# Patient Record
Sex: MALE | Race: BLACK OR AFRICAN AMERICAN | Employment: FULL TIME | URBAN - METROPOLITAN AREA
[De-identification: names, ages, dates, MRNs, and addresses within clinical notes are randomized per-mention and may not be internally consistent; named-entity substitution may affect disease eponyms.]

---

## 2019-03-14 ENCOUNTER — TELEPHONE (OUTPATIENT)
Dept: ALLERGY | Facility: CLINIC | Age: 54
End: 2019-03-14

## 2019-03-14 ENCOUNTER — TELEPHONE (OUTPATIENT)
Dept: FAMILY MEDICINE | Facility: CLINIC | Age: 54
End: 2019-03-14

## 2019-03-14 DIAGNOSIS — E11.9 TYPE 2 DIABETES MELLITUS WITHOUT COMPLICATION, WITHOUT LONG-TERM CURRENT USE OF INSULIN (H): Primary | ICD-10-CM

## 2019-03-14 RX ORDER — METFORMIN HCL 500 MG
2000 TABLET, EXTENDED RELEASE 24 HR ORAL
Qty: 120 TABLET | Refills: 1 | Status: SHIPPED | OUTPATIENT
Start: 2019-03-14 | End: 2019-05-18

## 2019-03-14 NOTE — TELEPHONE ENCOUNTER
Patient reports taking Metformin 500 mg XR 4 tablets daily.  He has been on this for years and reports no side effects.  He needs a refill for this medication as he came for his appointment with Mohit FRANCOIS and he had to leave due to emergency.  Please give 1 month worth of Prescription(s) for patient. Prescription(s) prompted.  Thank you. Laury Marion R.N.

## 2019-03-14 NOTE — TELEPHONE ENCOUNTER
Pt here for oppel visit but oppel cancelled all appts today.  Pt needs refill of medformin 500 mg.  [facility] or call to discuss    Thank you

## 2019-03-18 NOTE — PROGRESS NOTES
SUBJECTIVE:   Gregg Lua is a 53 year old male who presents to clinic today for the following health issues:      Hypertension Follow-up      Outpatient blood pressures are being checked at clinic.  Results are brought in results.    Low Salt Diet: low salt    Amount of exercise or physical activity:     Problems taking medications regularly: No    Medication side effects: headaches    Diet: regular  Recent change insurance.   headache with sneezing. Tx: over the counter antihistamin and helped, but wants to try claritin D . But concerned about HTN.     Problem list and histories reviewed & adjusted, as indicated.  Additional history: as documented    There is no problem list on file for this patient.    Past Surgical History:   Procedure Laterality Date     GALLBLADDER SURGERY         Social History     Tobacco Use     Smoking status: Never Smoker     Smokeless tobacco: Never Used   Substance Use Topics     Alcohol use: No     Frequency: Never     Family History   Problem Relation Age of Onset     Diabetes Mother      Heart Disease Father      Diabetes Brother      Diabetes Sister      Diabetes Sister      Diabetes Brother          Current Outpatient Medications   Medication Sig Dispense Refill     atorvastatin (LIPITOR) 10 MG tablet TK 1 T PO QD  1     fluticasone (FLONASE) 50 MCG/ACT nasal spray Spray 1-2 sprays into both nostrils daily 1 g 11     lisinopril (PRINIVIL/ZESTRIL) 10 MG tablet TK 1 T PO QD  1     lisinopril (PRINIVIL/ZESTRIL) 20 MG tablet Take 1 tablet (20 mg) by mouth daily 90 tablet 0     metFORMIN (GLUCOPHAGE-XR) 500 MG 24 hr tablet Take 4 tablets (2,000 mg) by mouth daily (with dinner) 120 tablet 1     UNKNOWN TO PATIENT Little blue one for diabetes       No Known Allergies    Reviewed and updated as needed this visit by clinical staff  Tobacco  Allergies  Meds  Med Hx  Surg Hx  Fam Hx  Soc Hx      Reviewed and updated as needed this visit by Provider         ROS:  Constitutional,  HEENT, cardiovascular, pulmonary, gi and gu systems are negative, except as otherwise noted.    OBJECTIVE:     BP (!) 143/92   Pulse 85   Temp 98.3  F (36.8  C) (Oral)   Wt 114.8 kg (253 lb)   SpO2 100%   There is no height or weight on file to calculate BMI.  GENERAL: healthy, alert and no distress  EYES: Eyes grossly normal to inspection, PERRL and conjunctivae and sclerae normal  HENT: ear canals and TM's normal, nose and mouth without ulcers or lesions  NECK: no adenopathy, no asymmetry, masses, or scars and thyroid normal to palpation  RESP: lungs clear to auscultation - no rales, rhonchi or wheezes  CV: regular rate and rhythm, normal S1 S2, no S3 or S4, no murmur, click or rub, no peripheral edema and peripheral pulses strong  PSYCH: mentation appears normal, affect normal/bright    Diagnostic Test Results:  none     ASSESSMENT/PLAN:         ICD-10-CM    1. Hypertension goal BP (blood pressure) < 140/90 I10 lisinopril (PRINIVIL/ZESTRIL) 20 MG tablet     Basic metabolic panel   2. Seasonal allergic rhinitis, unspecified trigger J30.2 fluticasone (FLONASE) 50 MCG/ACT nasal spray   3. Type 2 diabetes mellitus without complication, without long-term current use of insulin (H) E11.9 DIABETES EDUCATOR REFERRAL   1. Increase lisinopril 20mg daily. Recheck blood pressure in 2 wks with RN and labs.   2. Start flonase, ok for claritin over the counter  3. Due for check up in 2 months. Is due for fasting labs at that time.       Mohit Randall PA-C  [facility]

## 2019-03-19 ENCOUNTER — OFFICE VISIT (OUTPATIENT)
Dept: FAMILY MEDICINE | Facility: CLINIC | Age: 54
End: 2019-03-19
Payer: COMMERCIAL

## 2019-03-19 VITALS
OXYGEN SATURATION: 100 % | HEART RATE: 85 BPM | SYSTOLIC BLOOD PRESSURE: 143 MMHG | DIASTOLIC BLOOD PRESSURE: 92 MMHG | WEIGHT: 253 LBS | TEMPERATURE: 98.3 F

## 2019-03-19 DIAGNOSIS — E11.9 TYPE 2 DIABETES MELLITUS WITHOUT COMPLICATION, WITHOUT LONG-TERM CURRENT USE OF INSULIN (H): ICD-10-CM

## 2019-03-19 DIAGNOSIS — I10 HYPERTENSION GOAL BP (BLOOD PRESSURE) < 140/90: Primary | ICD-10-CM

## 2019-03-19 DIAGNOSIS — J30.2 SEASONAL ALLERGIC RHINITIS, UNSPECIFIED TRIGGER: ICD-10-CM

## 2019-03-19 PROCEDURE — 99203 OFFICE O/P NEW LOW 30 MIN: CPT | Performed by: PHYSICIAN ASSISTANT

## 2019-03-19 RX ORDER — ATORVASTATIN CALCIUM 10 MG/1
TABLET, FILM COATED ORAL
Refills: 1 | COMMUNITY
Start: 2019-03-02 | End: 2019-06-11

## 2019-03-19 RX ORDER — LISINOPRIL 20 MG/1
20 TABLET ORAL DAILY
Qty: 90 TABLET | Refills: 0 | Status: SHIPPED | OUTPATIENT
Start: 2019-03-19 | End: 2019-07-25

## 2019-03-19 RX ORDER — FLUTICASONE PROPIONATE 50 MCG
1-2 SPRAY, SUSPENSION (ML) NASAL DAILY
Qty: 1 G | Refills: 11 | Status: SHIPPED | OUTPATIENT
Start: 2019-03-19 | End: 2020-12-22

## 2019-03-19 RX ORDER — LISINOPRIL 10 MG/1
TABLET ORAL
Refills: 1 | COMMUNITY
Start: 2019-02-14 | End: 2019-04-11

## 2019-03-19 SDOH — HEALTH STABILITY: MENTAL HEALTH: HOW OFTEN DO YOU HAVE A DRINK CONTAINING ALCOHOL?: NEVER

## 2019-03-19 NOTE — NURSING NOTE
Chief Complaint   Patient presents with     Hypertension     Blood Pressure and headaches     Headache       Initial BP (!) 143/92   Pulse 85   Temp 98.3  F (36.8  C) (Oral)   Wt 114.8 kg (253 lb)   SpO2 100%  There is no height or weight on file to calculate BMI.  Medication Reconciliation: justus Pierce MA

## 2019-03-27 PROBLEM — I10 HYPERTENSION GOAL BP (BLOOD PRESSURE) < 140/90: Status: ACTIVE | Noted: 2019-03-27

## 2019-03-27 PROBLEM — E11.9 TYPE 2 DIABETES MELLITUS WITHOUT COMPLICATION, WITHOUT LONG-TERM CURRENT USE OF INSULIN (H): Status: ACTIVE | Noted: 2019-03-27

## 2019-03-29 ENCOUNTER — ALLIED HEALTH/NURSE VISIT (OUTPATIENT)
Dept: EDUCATION SERVICES | Facility: CLINIC | Age: 54
End: 2019-03-29
Payer: COMMERCIAL

## 2019-03-29 ENCOUNTER — TELEPHONE (OUTPATIENT)
Dept: FAMILY MEDICINE | Facility: CLINIC | Age: 54
End: 2019-03-29

## 2019-03-29 DIAGNOSIS — E11.9 TYPE 2 DIABETES MELLITUS WITHOUT COMPLICATION, WITHOUT LONG-TERM CURRENT USE OF INSULIN (H): Primary | ICD-10-CM

## 2019-03-29 DIAGNOSIS — E11.9 DIABETES MELLITUS WITHOUT COMPLICATION (H): ICD-10-CM

## 2019-03-29 PROCEDURE — G0108 DIAB MANAGE TRN  PER INDIV: HCPCS

## 2019-03-29 RX ORDER — LANCETS
EACH MISCELLANEOUS
Qty: 200 EACH | Refills: 3 | Status: SHIPPED | OUTPATIENT
Start: 2019-03-29 | End: 2022-08-25

## 2019-03-29 RX ORDER — GLUCOSAMINE HCL/CHONDROITIN SU 500-400 MG
CAPSULE ORAL
Qty: 200 EACH | Refills: 3 | Status: SHIPPED | OUTPATIENT
Start: 2019-03-29 | End: 2022-08-25

## 2019-03-29 RX ORDER — LANCETS
EACH MISCELLANEOUS
Qty: 102 EACH | Refills: 11 | Status: SHIPPED | OUTPATIENT
Start: 2019-03-29 | End: 2019-03-29

## 2019-03-29 NOTE — PATIENT INSTRUCTIONS
Diabetes Support Resources:  Follow up with Mohit madera of [facility]     Bring blood glucose meter and logbook with you to all doctor and follow-up appointments.    Diabetes Education Telephone Visit Follow-up:    We realize your time is valuable and your health is important! We offer a convenient Telephone Visit follow up! It s a quick way to check in for a medication dose adjustment without having to come back to clinic as soon.    Telephone Visits are often covered by insurance. Please check with your insurance plan to see if this type of visit is covered. If not, the cost is less expensive than an office visit:      Up to 10 minutes (Code 22681): $30    11-20 minutes (Code 40875): $59    More than 20 minutes (Code 32556): $85    Talk with your Diabetes Educator if you want to learn more.      [facility]:  For Your Diabetes Education and Nutrition Appointments Call:  [phone]   For Diabetes Education or Nutrition Related Questions:   Phone: [phone]  E-mail: [email or web address]  Fax: [phone]   If you need a medication refill please contact your pharmacy. Please allow 3 business days for your refills to be completed.

## 2019-03-29 NOTE — TELEPHONE ENCOUNTER
Page Barros sent a fax over  - Plan does not cover accu-check glide test strips. Per rx benefit plan, alternative medications include: One touch ultra Blue TE. Please fax back if approved along with directions quantity and refills, thanks

## 2019-03-29 NOTE — PROGRESS NOTES
Diabetes Self-Management Education & Support    Diabetes Education Self Management & Training    SUBJECTIVE/OBJECTIVE:  Presents for: Individual review  Accompanied by: Self  Diabetes education in the past 24mo: Yes  Focus of Visit: Monitoring, Taking Medication, Healthy Eating, Diabetes Pathophysiology  Diabetes type: Type 2  Date of diagnosis: 2017  Disease course: Stable  Transportation concerns: No  Other concerns:: None  Cultural Influences/Ethnic Background:  American      Diabetes Symptoms & Complications  Blurred vision: No  Fatigue: No  Neuropathy: No  Foot ulcerations: No  Polydipsia: No  Polyphagia: No  Polyuria: No  Visual change: No  Weakness: No  Weight loss: Yes  Slow healing wounds: No  Recent Infection(s): No  Symptom course: Stable  Weight trend: Stable  Autonomic neuropathy: No  CVA: No  Heart disease: No  Nephropathy: No  Peripheral neuropathy: No  Peripheral Vascular Disease: No  Retinopathy: No  Sexual dysfunction: No    Patient Problem List and Family Medical History reviewed for relevant medical history, current medical status, and diabetes risk factors.    Vitals:  There were no vitals taken for this visit.  There is no height or weight on file to calculate BMI.   Last 3 BP:   BP Readings from Last 3 Encounters:   03/19/19 (!) 143/92   01/10/11 147/99       History   Smoking Status     Never Smoker   Smokeless Tobacco     Never Used       Labs:  No results found for: A1C  No results found for: GLC  No results found for: LDL  No results found for: HDL]  No results found for: GFRESTIMATED  No results found for: GFRESTBLACK  No results found for: CR  No results found for: MICROALBUMIN    Healthy Eating  Healthy Eating Assessed Today: Yes  Cultural/Quaker diet restrictions?: No  Patient on a regular basis: Eats 3 meals a day  Meal planning: Smaller portions, Low salt  Meals include: Breakfast, Lunch, Dinner, Snacks  Breakfast: banana, oatmeal or egg white sandwich @ 9:30  Lunch: @ noon,  [employer] meals prepared .  or some foods from the store  Dinner: @ 7:30 sandwich, meat, or a bowl of cereal.  trying to get in more fruit  Snacks:  snack bars from [employer] , popcorn  Beverages: Water, Coffee  Has patient met with a dietitian in the past?: No    Being Active  Being Active Assessed Today: Yes  Exercise:: Yes  Days per week of moderate to strenuous exercise (like a brisk walk): 5  On average, minutes per day of exercise at this level: 60  How intense was your typical exercise? : Moderate (like brisk walking)(work out at the gym)  Exercise Minutes per Week: 300  Barrier to exercise: None    Monitoring  Monitoring Assessed Today: Yes  Did patient bring glucose meter to appointment? : No  Blood Glucose Meter: Accu-check  Home Glucose (Sugar) Monitorin-2 times per week  Blood glucose trend: No change  Low Glucose Range (mg/dL): <70  High Glucose Range (mg/dL): 130-140  Overall Range (mg/dL):     Forgot his meter    Taking Medications  Diabetes Medication(s)     Biguanides       metFORMIN (GLUCOPHAGE-XR) 500 MG 24 hr tablet    Take 4 tablets (2,000 mg) by mouth daily (with dinner)          Taking Medication Assessed Today: Yes  Current Treatments: Oral Agent (monotherapy), Diet(metformin  mg takes all with evening meal)  Problems taking diabetes medications regularly?: No  Diabetes medication side effects?: No  Treatment Compliance: All of the time    Problem Solving  Problem Solving Assessed Today: Yes  Hypoglycemia Frequency: Rarely  Hypoglycemia Treatment: Candy  Patient carries a carbohydrate source: Yes  Medical alert: No  Severe weather/disaster plan for diabetes management?: No  DKA prevention plan?: No  Sick day plan for diabetes management?: (P) No    Hypoglycemia symptoms  Confusion: No  Dizziness or Light-Headedness: No  Headaches: Yes  Hunger: No  Mood changes: No  Nervousness/Anxiety: Yes  Sleepiness: No  Speech difficulty: No  Sweats: Yes  Tremors: Yes    Hypoglycemia  Complications  Blackouts: No  Hospitalization: No  Nocturnal hypoglycemia: No  Required assistance: No  Required glucagon injection: No  Seizures: No    Reducing Risks  Reducing Risks Assessed Today: Yes  Diabetes Risks: Age over 45 years, Family History  CAD Risks: Hypertension, Diabetes Mellitus, Male sex, Family history  Has dilated eye exam at least once a year?: No  Sees dentist every 6 months?: No  Sees podiatrist (foot doctor)?: No    Healthy Coping  Healthy Coping Assessed Today: Yes  Emotional response to diabetes: Ready to learn, Acceptance  Informal Support system:: Significant other  Stage of change: ACTION (Actively working towards change)  Difficulty affording diabetes management supplies?: No  Patient Activation Measure Survey Score:  No flowsheet data found.    ASSESSMENT:  Gregg comes today for update and to establish care to manage his diabetes.  Last A1C in 11/2018 in [employer] system 6.5%.  Very motivated young man, and wanting to keep his diabetes in control.  He really wanted to focus on diet.    Goals Addressed as of 3/29/2019 at 1:43 PM       Monitoring (pt-stated)     Added 3/29/19 by Rosalind Garcia RN     My Goal: I will monitor BG    What I need to meet my goal: 1. Test every morning and 2 hrs after a meal    I plan to meet my goal by this date: 2 months            Patient's most recent 11/2018 6.5% from [facility]: A1C is meeting goal of <7.0    INTERVENTION:   Diabetes knowledge and skills assessment:     Patient is knowledgeable in diabetes management concepts related to: Being Active, Monitoring and Taking Medication    Patient needs further education on the following diabetes management concepts: Healthy Eating    Based on learning assessment above, most appropriate setting for further diabetes education would be: Individual setting.    Education provided today on:  AADE Self-Care Behaviors:  Diabetes Pathophysiology  Healthy Eating: carbohydrate counting, consistency in amount,  composition, and timing of food intake, weight reduction, heart healthy diet, eating out, portion control, plate planning method and label reading  Monitoring: purpose, proper technique, log and interpret results, individual blood glucose targets and frequency of monitoring  Taking Medication: action of prescribed medication, side effects of prescribed medications and when to take medications    Opportunities for ongoing education and support in diabetes-self management were discussed.    Pt verbalized understanding of concepts discussed and recommendations provided today.       Education Materials Provided:  Sarah Understanding Diabetes Booklet, Carbohydrate Counting and My Plate Planner    PLAN:  See Patient Instructions for co-developed, patient-stated behavior change goals.  AVS printed and provided to patient today. See Follow-Up section for recommended follow-up.    Melanie Garcia RN/LEANNE Smith Diabetes Educator    Time Spent: 60 minutes  Encounter Type: Individual    Any diabetes medication dose changes were made via the CDE Protocol and Collaborative Practice Agreement with the patient's primary care provider. A copy of this encounter was shared with the provider.

## 2019-04-08 ENCOUNTER — ALLIED HEALTH/NURSE VISIT (OUTPATIENT)
Dept: NURSING | Facility: CLINIC | Age: 54
End: 2019-04-08
Payer: COMMERCIAL

## 2019-04-08 ENCOUNTER — TELEPHONE (OUTPATIENT)
Dept: FAMILY MEDICINE | Facility: CLINIC | Age: 54
End: 2019-04-08

## 2019-04-08 VITALS — SYSTOLIC BLOOD PRESSURE: 138 MMHG | DIASTOLIC BLOOD PRESSURE: 86 MMHG | HEART RATE: 72 BPM | OXYGEN SATURATION: 99 %

## 2019-04-08 DIAGNOSIS — I10 HYPERTENSION GOAL BP (BLOOD PRESSURE) < 140/90: Primary | ICD-10-CM

## 2019-04-08 PROCEDURE — 99207 ZZC NO CHARGE NURSE ONLY: CPT | Performed by: FAMILY MEDICINE

## 2019-04-08 NOTE — PROGRESS NOTES
Gregg Lua is a 53 year old patient who comes in today for a Blood Pressure check.  Initial BP:  /90   Pulse 72   SpO2 99%      72  Disposition: BP elevated.  Triage RN notified, patient asked to wait.    Patient has been feeling woozy since medication change.        BP Readings from Last 3 Encounters:   04/08/19 140/90   03/19/19 (!) 143/92   01/10/11 147/99           Home machine is 150/59 pulse 77.          Na Lamas MA

## 2019-04-08 NOTE — TELEPHONE ENCOUNTER
Patient at clinic for bp check.  He has questions regarding symptoms of high blood pressure and when he should be checking his bp.  He states that he was switched from lisinopril 10mg po every day to lisinopril 20mg q day on 3/19/19.  He takes his lisinopril in the pm when he takes his other meds.  He states that he bought a wrist cuff and takes bp at home but runs 170's/ 90's.  I checked his bp manually with the large adult cuff right arm.  It was 138/86.  He then checked it with his wrist cuff immediately after and it was 197/97.  I let him know that that was way too much of a difference and that he shouldn't rely on his wrist cuff.  I did let him know that our pharmacy does have a blood pressure program that he can sign up for; is free but does require some paperwork.  He states that he comes back in on Thursday to see Mohit Randall PA-C so will discuss with him at that time.  He does not have the receipt or box for the wrist cuff; doesn't want to purchase another one at this time. I did review some of the possible symptoms with high blood pressure:  Bad headache, vision concerns, facial tingling, irregular heart beat, chest pain.  He states that he's noticed that he's gotten a dull headache front of head that will persist for quite a while; states noticed when out golfing recently but he also feels that he's got seasonal allergies and wondering if that may be cause of headache.  States that he and wife had a bad argument this morning before he came here and feels that is what raised his blood pressure at that time.  Checked bp after argument at home and was in the 170's.  I did also remind him that his cuff does not appear to be very accurate.  He states Mohit Randall PA-C had told him can use OTC allergy meds but not with the decongestants/pseudophedrine.  Wondering name/brand he can try.  I advised he check with pharmacist; let them know he has hypertension.  Did tell him to check out the plain zyrtec. I also  advised him to make sure that he is drinking enough water; dehydration can cause headaches as well.  Patient denies any symptoms at this time.  States does have to urinate more often with the increase in the lisinopril.  He will keep his appointment with Mohit Randall PA-C for Thursday.  Shima Cummings RN

## 2019-04-10 NOTE — PROGRESS NOTES
SUBJECTIVE:                                                    Gregg Lua is a 53 year old male seen today who  has a past medical history of Diabetes mellitus, type 2 (H), High cholesterol, and Hypertension.   who presents to clinic today for the following health issues:       Hypertension Follow-up    Answers for HPI/ROS submitted by the patient on 4/11/2019   Chronic problems general questions HPI Form  Outpatient blood pressures: are being checked  Dietary sodium intake:: Low salt diet  Blood pressures checked at: Home    History of DM and is fasting today.     Problem list and histories reviewed & adjusted, as indicated.  Additional history: as documented    Patient Active Problem List   Diagnosis     Type 2 diabetes mellitus without complication, without long-term current use of insulin (H)     Hypertension goal BP (blood pressure) < 140/90     Past Surgical History:   Procedure Laterality Date     GALLBLADDER SURGERY         Social History     Tobacco Use     Smoking status: Never Smoker     Smokeless tobacco: Never Used   Substance Use Topics     Alcohol use: No     Frequency: Never     Family History   Problem Relation Age of Onset     Diabetes Mother      Heart Disease Father      Diabetes Brother      Diabetes Sister      Diabetes Sister      Diabetes Brother          Current Outpatient Medications   Medication Sig Dispense Refill     alcohol swab prep pads Use to swab area of injection/lyle as directed. Pt tests two times daily. 200 each 3     atorvastatin (LIPITOR) 10 MG tablet TK 1 T PO QD  1     blood glucose (NO BRAND SPECIFIED) test strip Use to test blood sugar 2 times daily or as directed. To accompany: Blood Glucose Monitor Brands: per insurance. 200 strip 3     blood glucose (ONETOUCH VERIO IQ) test strip Use to test blood sugars 2 times daily or as directed. 100 each prn     blood glucose calibration (NO BRAND SPECIFIED) solution To accompany: Blood Glucose Monitor Brands: per insurance. 1  Bottle 3     blood glucose monitoring (NO BRAND SPECIFIED) meter device kit Use to test blood sugar 2 times daily or as directed. Brand  per insurance. 1 kit 0     blood glucose monitoring (ONE TOUCH DELICA) lancets Use to test blood sugar 2 times daily or as directed.  Ok to substitute alternative if insurance prefers. 100 each 11     fluticasone (FLONASE) 50 MCG/ACT nasal spray Spray 1-2 sprays into both nostrils daily 1 g 11     lisinopril (PRINIVIL/ZESTRIL) 20 MG tablet Take 1 tablet (20 mg) by mouth daily 90 tablet 0     metFORMIN (GLUCOPHAGE-XR) 500 MG 24 hr tablet Take 4 tablets (2,000 mg) by mouth daily (with dinner) 120 tablet 1     order for DME Equipment being ordered: BP monitor 1 Device 1     thin (NO BRAND SPECIFIED) lancets Use with lanceting device. To accompany: Blood Glucose Monitor Brands: per insurance. Pt tests two times daily. 200 each 3     UNKNOWN TO PATIENT [place] blue one for diabetes       No Known Allergies  Recent Labs   Lab Test 04/11/19  1050   A1C 6.0*      BP Readings from Last 3 Encounters:   04/11/19 131/71   04/08/19 138/86   03/19/19 (!) 143/92    Wt Readings from Last 3 Encounters:   04/11/19 113.4 kg (250 lb)   03/19/19 114.8 kg (253 lb)                  Labs reviewed in EPIC    ROS:  Constitutional, HEENT, cardiovascular, pulmonary, gi and gu systems are negative, except as otherwise noted.    OBJECTIVE:     /71   Pulse 79   Resp 18   Wt 113.4 kg (250 lb)   SpO2 100%   There is no height or weight on file to calculate BMI.  GENERAL: healthy, alert and no distress  RESP: lungs clear to auscultation - no rales, rhonchi or wheezes  CV: regular rate and rhythm, normal S1 S2, no S3 or S4, no murmur, click or rub, no peripheral edema and peripheral pulses strong  ABDOMEN: soft, nontender, no hepatosplenomegaly, no masses and bowel sounds normal  MS: no gross musculoskeletal defects noted, no edema    Diagnostic Test Results:  Results for orders placed or performed in visit  on 04/11/19 (from the past 24 hour(s))   HEMOGLOBIN A1C   Result Value Ref Range    Hemoglobin A1C 6.0 (H) 0 - 5.6 %       ASSESSMENT/PLAN:       ICD-10-CM    1. Hypertension goal BP (blood pressure) < 140/90 I10 Lipid panel reflex to direct LDL Fasting     order for DME     Basic metabolic panel   2. Type 2 diabetes mellitus without complication, without long-term current use of insulin (H) E11.9 HEMOGLOBIN A1C     Lipid panel reflex to direct LDL Fasting     Albumin Random Urine Quantitative with Creat Ratio     TSH WITH FREE T4 REFLEX     Basic metabolic panel   3. Need for hepatitis C screening test Z11.59 Hepatitis C Screen Reflex to HCV RNA Quant and Genotype   1. Stable. Recheck 6 months.   2. Labs pending.   Work on Healthy diet and exercise. Getting heart rate elevated for 30 mins most days of week.      Mohit Randall PA-C  [facility]    Answers for HPI/ROS submitted by the patient on 4/11/2019   Chronic problems general questions HPI Form  Outpatient blood pressures: are being checked  Dietary sodium intake:: Low salt diet  Blood pressures checked at: Home

## 2019-04-11 ENCOUNTER — OFFICE VISIT (OUTPATIENT)
Dept: FAMILY MEDICINE | Facility: CLINIC | Age: 54
End: 2019-04-11
Payer: COMMERCIAL

## 2019-04-11 VITALS
OXYGEN SATURATION: 100 % | HEART RATE: 79 BPM | WEIGHT: 250 LBS | SYSTOLIC BLOOD PRESSURE: 131 MMHG | DIASTOLIC BLOOD PRESSURE: 71 MMHG | RESPIRATION RATE: 18 BRPM

## 2019-04-11 DIAGNOSIS — E11.9 TYPE 2 DIABETES MELLITUS WITHOUT COMPLICATION, WITHOUT LONG-TERM CURRENT USE OF INSULIN (H): ICD-10-CM

## 2019-04-11 DIAGNOSIS — Z11.59 NEED FOR HEPATITIS C SCREENING TEST: ICD-10-CM

## 2019-04-11 DIAGNOSIS — I10 HYPERTENSION GOAL BP (BLOOD PRESSURE) < 140/90: Primary | ICD-10-CM

## 2019-04-11 LAB
ANION GAP SERPL CALCULATED.3IONS-SCNC: 7 MMOL/L (ref 3–14)
BUN SERPL-MCNC: 13 MG/DL (ref 7–30)
CALCIUM SERPL-MCNC: 9 MG/DL (ref 8.5–10.1)
CHLORIDE SERPL-SCNC: 109 MMOL/L (ref 94–109)
CHOLEST SERPL-MCNC: 103 MG/DL
CO2 SERPL-SCNC: 25 MMOL/L (ref 20–32)
CREAT SERPL-MCNC: 1.02 MG/DL (ref 0.66–1.25)
GFR SERPL CREATININE-BSD FRML MDRD: 82 ML/MIN/{1.73_M2}
GLUCOSE SERPL-MCNC: 100 MG/DL (ref 70–99)
HBA1C MFR BLD: 6 % (ref 0–5.6)
HDLC SERPL-MCNC: 40 MG/DL
LDLC SERPL CALC-MCNC: 35 MG/DL
NONHDLC SERPL-MCNC: 63 MG/DL
POTASSIUM SERPL-SCNC: 4.3 MMOL/L (ref 3.4–5.3)
SODIUM SERPL-SCNC: 141 MMOL/L (ref 133–144)
TRIGL SERPL-MCNC: 138 MG/DL
TSH SERPL DL<=0.005 MIU/L-ACNC: 1.78 MU/L (ref 0.4–4)

## 2019-04-11 PROCEDURE — 84443 ASSAY THYROID STIM HORMONE: CPT | Performed by: PHYSICIAN ASSISTANT

## 2019-04-11 PROCEDURE — 83036 HEMOGLOBIN GLYCOSYLATED A1C: CPT | Performed by: PHYSICIAN ASSISTANT

## 2019-04-11 PROCEDURE — 80061 LIPID PANEL: CPT | Performed by: PHYSICIAN ASSISTANT

## 2019-04-11 PROCEDURE — 80048 BASIC METABOLIC PNL TOTAL CA: CPT | Performed by: PHYSICIAN ASSISTANT

## 2019-04-11 PROCEDURE — 86803 HEPATITIS C AB TEST: CPT | Performed by: PHYSICIAN ASSISTANT

## 2019-04-11 PROCEDURE — 36415 COLL VENOUS BLD VENIPUNCTURE: CPT | Performed by: PHYSICIAN ASSISTANT

## 2019-04-11 PROCEDURE — 82043 UR ALBUMIN QUANTITATIVE: CPT | Performed by: PHYSICIAN ASSISTANT

## 2019-04-11 PROCEDURE — 99213 OFFICE O/P EST LOW 20 MIN: CPT | Performed by: PHYSICIAN ASSISTANT

## 2019-04-11 NOTE — LETTER
April 12, 2019    Gregg Lua  [id number] [facility] [id number]        Mr. Lua,    All of your labs were normal/ stable for you.    Please contact the clinic if you have additional questions.  Thank you.    Sincerely,    Mohit Randall PA-C     Results for orders placed or performed in visit on 04/11/19   HEMOGLOBIN A1C   Result Value Ref Range    Hemoglobin A1C 6.0 (H) 0 - 5.6 %   Lipid panel reflex to direct LDL Fasting   Result Value Ref Range    Cholesterol 103 <200 mg/dL    Triglycerides 138 <150 mg/dL    HDL Cholesterol 40 >39 mg/dL    LDL Cholesterol Calculated 35 <100 mg/dL    Non HDL Cholesterol 63 <130 mg/dL   TSH WITH FREE T4 REFLEX   Result Value Ref Range    TSH 1.78 0.40 - 4.00 mU/L   Basic metabolic panel   Result Value Ref Range    Sodium 141 133 - 144 mmol/L    Potassium 4.3 3.4 - 5.3 mmol/L    Chloride 109 94 - 109 mmol/L    Carbon Dioxide 25 20 - 32 mmol/L    Anion Gap 7 3 - 14 mmol/L    Glucose 100 (H) 70 - 99 mg/dL    Urea Nitrogen 13 7 - 30 mg/dL    Creatinine 1.02 0.66 - 1.25 mg/dL    GFR Estimate 82 >60 mL/min/[1.73_m2]    GFR Estimate If Black >90 >60 mL/min/[1.73_m2]    Calcium 9.0 8.5 - 10.1 mg/dL

## 2019-04-12 LAB
CREAT UR-MCNC: 197 MG/DL
HCV AB SERPL QL IA: NONREACTIVE
MICROALBUMIN UR-MCNC: 11 MG/L
MICROALBUMIN/CREAT UR: 5.69 MG/G CR (ref 0–17)

## 2019-04-12 NOTE — RESULT ENCOUNTER NOTE
Mr. Lua,    All of your labs were normal/ stable for you.    Please contact the clinic if you have additional questions.  Thank you.    Sincerely,    Mohit Randall PA-C

## 2019-05-03 ENCOUNTER — TELEPHONE (OUTPATIENT)
Dept: EDUCATION SERVICES | Facility: CLINIC | Age: 54
End: 2019-05-03

## 2019-05-03 DIAGNOSIS — Z53.9 NO SHOW: Primary | ICD-10-CM

## 2019-05-18 DIAGNOSIS — E11.9 TYPE 2 DIABETES MELLITUS WITHOUT COMPLICATION, WITHOUT LONG-TERM CURRENT USE OF INSULIN (H): ICD-10-CM

## 2019-05-21 RX ORDER — METFORMIN HCL 500 MG
TABLET, EXTENDED RELEASE 24 HR ORAL
Qty: 120 TABLET | Refills: 4 | Status: SHIPPED | OUTPATIENT
Start: 2019-05-21 | End: 2019-10-10

## 2019-06-11 DIAGNOSIS — E11.9 TYPE 2 DIABETES MELLITUS WITHOUT COMPLICATION, WITHOUT LONG-TERM CURRENT USE OF INSULIN (H): Primary | ICD-10-CM

## 2019-06-11 RX ORDER — ATORVASTATIN CALCIUM 10 MG/1
TABLET, FILM COATED ORAL
Qty: 90 TABLET | Refills: 2 | Status: SHIPPED | OUTPATIENT
Start: 2019-06-11 | End: 2020-03-02

## 2019-06-11 NOTE — TELEPHONE ENCOUNTER
Routing refill request to provider for review/approval because:  Medication is reported/historical    Winifred Beltran BSN, RN

## 2019-06-19 ENCOUNTER — OFFICE VISIT (OUTPATIENT)
Dept: FAMILY MEDICINE | Facility: CLINIC | Age: 54
End: 2019-06-19
Payer: COMMERCIAL

## 2019-06-19 VITALS
WEIGHT: 253 LBS | TEMPERATURE: 98.1 F | DIASTOLIC BLOOD PRESSURE: 83 MMHG | BODY MASS INDEX: 38.34 KG/M2 | HEART RATE: 83 BPM | HEIGHT: 68 IN | SYSTOLIC BLOOD PRESSURE: 139 MMHG | OXYGEN SATURATION: 100 %

## 2019-06-19 DIAGNOSIS — M54.2 NECK PAIN: Primary | ICD-10-CM

## 2019-06-19 DIAGNOSIS — E11.9 TYPE 2 DIABETES MELLITUS WITHOUT COMPLICATION, WITHOUT LONG-TERM CURRENT USE OF INSULIN (H): ICD-10-CM

## 2019-06-19 PROBLEM — E66.01 MORBID OBESITY (H): Status: ACTIVE | Noted: 2019-06-19

## 2019-06-19 PROCEDURE — 99214 OFFICE O/P EST MOD 30 MIN: CPT | Performed by: FAMILY MEDICINE

## 2019-06-19 RX ORDER — CYCLOBENZAPRINE HCL 5 MG
TABLET ORAL
Qty: 60 TABLET | Refills: 0 | Status: SHIPPED | OUTPATIENT
Start: 2019-06-19 | End: 2021-11-11

## 2019-06-19 RX ORDER — HYDROCODONE BITARTRATE AND ACETAMINOPHEN 5; 325 MG/1; MG/1
1 TABLET ORAL
Qty: 10 TABLET | Refills: 0 | Status: SHIPPED | OUTPATIENT
Start: 2019-06-19 | End: 2019-08-13

## 2019-06-19 ASSESSMENT — MIFFLIN-ST. JEOR: SCORE: 1957.1

## 2019-06-19 NOTE — NURSING NOTE
"Chief Complaint   Patient presents with     Neck Pain     noticed it a little on Mon but is getting more noticable now and has felt on & off into the LT shoulder-worked out hard on mond or may have slept wrong       Initial /83   Pulse 83   Temp 98.1  F (36.7  C) (Oral)   Ht 1.727 m (5' 8\")   Wt 114.8 kg (253 lb)   SpO2 100%   BMI 38.47 kg/m   Estimated body mass index is 38.47 kg/m  as calculated from the following:    Height as of this encounter: 1.727 m (5' 8\").    Weight as of this encounter: 114.8 kg (253 lb)..  BP completed using cuff size: large    "

## 2019-06-19 NOTE — PROGRESS NOTES
"Chief complaint: neck pain left     Yesterday when he woke up and tried to get out of the bed and had a sudden pain  8-9/10 yesterday  Today about an 8/10    Patient took some advil and tylenol didn't seem to help us much  Left neck feels like a knot  If he tries to turn to the left and up tight    He did work out pretty hard 3 days ago     DM doing well per patient   History of trauma: none   Worse with movement relieved by rest  Worse with certain positions.  No fevers or chills chest pain or shortness of breath   No orthopnea pnd or edema     No thoughts of harming self or others      Problem list, Medication list, Allergies, and Medical/Social/Surgical histories reviewed in [facility] and updated as appropriate.        REVIEW OF SYSTEMS  General: negative for fever, constitutional symptoms or weight loss  Resp: negative for chest pain or shortness of breath  CV: negative for chest pain  : negative for dysuria , incontinence, frequency  Musculoskeletal: as above  Neurologic: negative for ataxia, saddle anesthesia, fecal or urinary incontinence, one sided weakness,  Paresthesias  Constitutional, HEENT, cardiovascular, pulmonary, gi and gu systems are negative, except as otherwise noted.    Physical Exam:  Vitals: /83   Pulse 83   Temp 98.1  F (36.7  C) (Oral)   Ht 1.727 m (5' 8\")   Wt 114.8 kg (253 lb)   SpO2 100%   BMI 38.47 kg/m    BMI= Body mass index is 38.47 kg/m .  Constitutional: healthy, alert and no acute distress   Head: atraumatic  Eyes: anicteric  CARDIO: regular in rate and rhythm no murmurs rubs or gallops  RESP: lungs clear to auscultation  Musculoskeletal: Nocervical spine tenderness   YES trapezius and levator muscle spasm on the left    Increased pain  In range of motion of the neck  No neurologic deficits. No sensory deficits or motor deficits both upper and lower extremity   Negative Lhermitte's phenomenon  GAIT: intact  Psychiatric: mentation appears normal and affect " normal/bright  Skin: no rashes      Impression:   No diagnosis found.     ICD-10-CM    1. Neck pain M54.2 cyclobenzaprine (FLEXERIL) 5 MG tablet     HYDROcodone-acetaminophen (NORCO) 5-325 MG tablet     [facility] PT, HAND, AND CHIROPRACTIC REFERRAL   2. Type 2 diabetes mellitus without complication, without long-term current use of insulin (H) E11.9            Plan:  Possible injury trapezius with his new workout   Prescribed with flexeril  Prescribed with vicodin as needed moderate to severe pain not better with flexeril   reviewed no concerns   Warned both sedating  Warned vicodin habit forming  No thoughts of harming self or others  Sedating medications given. Aware not to drive or operate machinery while on these medications. Caution with .   Do not take with alcohol  Instructions for neck care and return precautions discussed.    neck pain stretching excercises discussed. supportive treatment.  considery physical therapy if not better despite supportive treatment.  activity modifications advised.  Over the counter medications discussed. Patient aware to avoid NSAIDS if with any kidney disease or ulcers. Proper dosing of over the counter medications likewise discussed.  Adverse reaction to medication discussed.  aware to come in right away if with any fever or chills, worsening symptoms, headache, bowel or bladder incontinence, motor or sensory deficits or gait disturbances.   follow-up recommended.    Continue DM with follow up with primary care provider  Stable.       Brandi Coronel MD

## 2019-06-25 ENCOUNTER — TELEPHONE (OUTPATIENT)
Dept: FAMILY MEDICINE | Facility: CLINIC | Age: 54
End: 2019-06-25

## 2019-06-25 NOTE — TELEPHONE ENCOUNTER
[facility] has Authorized Hydrocodone-acetaminophen.  Dated May 20, 2019 - June 18, 2020.  You can print this off on-line. Thank You.

## 2019-07-25 DIAGNOSIS — I10 HYPERTENSION GOAL BP (BLOOD PRESSURE) < 140/90: ICD-10-CM

## 2019-07-26 RX ORDER — LISINOPRIL 20 MG/1
TABLET ORAL
Qty: 90 TABLET | Refills: 1 | Status: SHIPPED | OUTPATIENT
Start: 2019-07-26 | End: 2019-10-10

## 2019-08-03 ENCOUNTER — OFFICE VISIT (OUTPATIENT)
Dept: URGENT CARE | Facility: URGENT CARE | Age: 54
End: 2019-08-03
Payer: COMMERCIAL

## 2019-08-03 VITALS
SYSTOLIC BLOOD PRESSURE: 142 MMHG | OXYGEN SATURATION: 100 % | HEART RATE: 86 BPM | DIASTOLIC BLOOD PRESSURE: 87 MMHG | TEMPERATURE: 98.1 F | WEIGHT: 253 LBS | BODY MASS INDEX: 38.47 KG/M2

## 2019-08-03 DIAGNOSIS — R47.81 SLURRED SPEECH: Primary | ICD-10-CM

## 2019-08-03 DIAGNOSIS — I10 HYPERTENSION GOAL BP (BLOOD PRESSURE) < 140/90: ICD-10-CM

## 2019-08-03 PROCEDURE — 99215 OFFICE O/P EST HI 40 MIN: CPT | Performed by: INTERNAL MEDICINE

## 2019-08-03 NOTE — PROGRESS NOTES
"SUBJECTIVE:  Gregg Lua is an 54 year old male who presents for feeling woozy and wanting blood pressure checked.  His woozy feeling is like a little bit of headache and feeling like his tongue is thick and speech is a little bit off or a little slurred like he's \"thick tongue\".   He's worried his BP is high  Is taking his medication consistently.  His lisinopril was increased from 10-20 mg a week ago when he found out he was taking a lower dose than he was supposed to.  Has DM and taking meds regularly.  Not having low blood sugars.  Works out 4-5 times a week usually but has been going less since feeling woozy.   No numbness or weakness.  Feels a little more tired than usual.  No vision changes.  Pt reports his sugars have been fine and is not running low or high over the past week.    PMH:   has a past medical history of Diabetes mellitus, type 2 (H), High cholesterol, and Hypertension.  Patient Active Problem List   Diagnosis     Type 2 diabetes mellitus without complication, without long-term current use of insulin (H)     Hypertension goal BP (blood pressure) < 140/90     Obesity (BMI 35.0-39.9) with comorbidity (H)     Social History     Socioeconomic History     Marital status:      Spouse name: None     Number of children: None     Years of education: None     Highest education level: None   Occupational History     None   Social Needs     Financial resource strain: None     Food insecurity:     Worry: None     Inability: None     Transportation needs:     Medical: None     Non-medical: None   Tobacco Use     Smoking status: Never Smoker     Smokeless tobacco: Never Used   Substance and Sexual Activity     Alcohol use: No     Frequency: Never     Drug use: No     Sexual activity: Yes     Partners: Female   Lifestyle     Physical activity:     Days per week: None     Minutes per session: None     Stress: None   Relationships     Social connections:     Talks on phone: None     Gets together: None "     Attends [employer] service: None     Active member of club or organization: None     Attends meetings of clubs or organizations: None     Relationship status: None     Intimate partner violence:     Fear of current or ex partner: None     Emotionally abused: None     Physically abused: None     Forced sexual activity: None   Other Topics Concern     None   Social History Narrative     None     Family History   Problem Relation Age of Onset     Diabetes Mother      Heart Disease Father      Diabetes Brother      Diabetes Sister      Diabetes Sister      Diabetes Brother        ALLERGIES:  Patient has no known allergies.    Current Outpatient Medications   Medication     alcohol swab prep pads     atorvastatin (LIPITOR) 10 MG tablet     blood glucose (NO BRAND SPECIFIED) test strip     blood glucose calibration (NO BRAND SPECIFIED) solution     blood glucose monitoring (NO BRAND SPECIFIED) meter device kit     blood glucose monitoring (ONE TOUCH DELICA) lancets     fluticasone (FLONASE) 50 MCG/ACT nasal spray     lisinopril (PRINIVIL/ZESTRIL) 20 MG tablet     metFORMIN (GLUCOPHAGE-XR) 500 MG 24 hr tablet     order for DME     thin (NO BRAND SPECIFIED) lancets     UNKNOWN TO PATIENT     cyclobenzaprine (FLEXERIL) 5 MG tablet     No current facility-administered medications for this visit.          ROS:  ROS is done and is negative for general/constitutional, eye, ENT, Respiratory, cardiovascular, GI, , Skin, musculoskeletal except as noted elsewhere.  All other review of systems negative except as noted elsewhere.      OBJECTIVE:  BP (!) 142/87   Pulse 86   Temp 98.1  F (36.7  C) (Oral)   Wt 114.8 kg (253 lb)   SpO2 100%   BMI 38.47 kg/m    GENERAL APPEARANCE: Alert, in no acute distress  EYES: normal  EARS: External ears normal. Canals clear. TM's normal.  NOSE:normal  OROPHARYNX:normal  NECK:No adenopathy,masses or thyromegaly  RESP: normal and clear to auscultation  CV:regular rate and rhythm and no  murmurs, clicks, or gallops  ABDOMEN: Abdomen soft, non-tender. BS normal. No masses, organomegaly  SKIN: no ulcers, lesions or rash  MUSCULOSKELETAL:Musculoskeletal normal  NEURO: CN 2-12 grossly intact.  Strength 5/5 and symmetric in bilateral upper and lower extremities.  DTRs 2+ and symmetric in bilateral upper and lower extremities.  Sensation to light touch grossly intact in bilateral upper and lower extremities.    RESULTS  Results for orders placed or performed in visit on 04/11/19   Hepatitis C Screen Reflex to HCV RNA Quant and Genotype   Result Value Ref Range    Hepatitis C Antibody Nonreactive NR^Nonreactive   HEMOGLOBIN A1C   Result Value Ref Range    Hemoglobin A1C 6.0 (H) 0 - 5.6 %   Lipid panel reflex to direct LDL Fasting   Result Value Ref Range    Cholesterol 103 <200 mg/dL    Triglycerides 138 <150 mg/dL    HDL Cholesterol 40 >39 mg/dL    LDL Cholesterol Calculated 35 <100 mg/dL    Non HDL Cholesterol 63 <130 mg/dL   Albumin Random Urine Quantitative with Creat Ratio   Result Value Ref Range    Creatinine Urine 197 mg/dL    Albumin Urine mg/L 11 mg/L    Albumin Urine mg/g Cr 5.69 0 - 17 mg/g Cr   TSH WITH FREE T4 REFLEX   Result Value Ref Range    TSH 1.78 0.40 - 4.00 mU/L   Basic metabolic panel   Result Value Ref Range    Sodium 141 133 - 144 mmol/L    Potassium 4.3 3.4 - 5.3 mmol/L    Chloride 109 94 - 109 mmol/L    Carbon Dioxide 25 20 - 32 mmol/L    Anion Gap 7 3 - 14 mmol/L    Glucose 100 (H) 70 - 99 mg/dL    Urea Nitrogen 13 7 - 30 mg/dL    Creatinine 1.02 0.66 - 1.25 mg/dL    GFR Estimate 82 >60 mL/min/[1.73_m2]    GFR Estimate If Black >90 >60 mL/min/[1.73_m2]    Calcium 9.0 8.5 - 10.1 mg/dL   .  No results found for this or any previous visit (from the past 48 hour(s)).    ASSESSMENT/PLAN:    ASSESSMENT / PLAN:  (R42.92) Slurred speech  (primary encounter diagnosis)  Comment: concern for possible TIAs.  Currently his neuro exam is normal and speech seems wnl, although I do not know  pt's normal speech.  His sxs could also be related to the increase in his lisinopril, but would not expect this to cause slurred speech.  Plan: will send him to ER via ambulance for further eval as stroke or tia possible causes of his sxs and could be life threatening.  Ambulance [facility] take [facility], which is his preferred hospital.      (I10) Hypertension goal BP (blood pressure) < 140/90  Comment: BP a little above goal  Plan: f/u with pcp in 1 week.  To ER now, as noted above.      See [facility] for orders, medications, letters, patient instructions    Lexie Iqbal M.D.

## 2019-08-03 NOTE — PATIENT INSTRUCTIONS
Go to the Emergency Room now.    Gregg to follow up with Primary Care provider regarding elevated blood pressure.

## 2019-08-05 ENCOUNTER — TELEPHONE (OUTPATIENT)
Dept: FAMILY MEDICINE | Facility: CLINIC | Age: 54
End: 2019-08-05

## 2019-08-07 ENCOUNTER — ALLIED HEALTH/NURSE VISIT (OUTPATIENT)
Dept: FAMILY MEDICINE | Facility: CLINIC | Age: 54
End: 2019-08-07
Payer: COMMERCIAL

## 2019-08-07 VITALS — SYSTOLIC BLOOD PRESSURE: 132 MMHG | DIASTOLIC BLOOD PRESSURE: 84 MMHG | HEART RATE: 84 BPM

## 2019-08-07 DIAGNOSIS — Z01.30 BLOOD PRESSURE CHECK: Primary | ICD-10-CM

## 2019-08-07 PROCEDURE — 99207 ZZC NO CHARGE NURSE ONLY: CPT | Performed by: PHYSICIAN ASSISTANT

## 2019-08-07 NOTE — PROGRESS NOTES
Gregg Lua was evaluated at [facility] on August 7, 2019 at which time his blood pressure was:    BP Readings from Last 3 Encounters:   08/07/19 132/84   08/03/19 (!) 142/87   06/19/19 139/83     Pulse Readings from Last 3 Encounters:   08/07/19 84   08/03/19 86   06/19/19 83       Reviewed lifestyle modifications for blood pressure control and reduction: including making healthy food choices, managing weight, getting regular exercise, smoking cessation, reducing alcohol consumption, monitoring blood pressure regularly.     Symptoms: None    BP Goal:< 140/90 mmHg    BP Assessment:  BP at goal    Potential Reasons for BP too high: NA - Not applicable    BP Follow-Up Plan: Recheck BP in 6 months at pharmacy    Recommendation to Provider: None    Note completed by: Taqueria Addison RPh.  [facility]  [phone]

## 2019-08-13 ENCOUNTER — OFFICE VISIT (OUTPATIENT)
Dept: FAMILY MEDICINE | Facility: CLINIC | Age: 54
End: 2019-08-13
Payer: COMMERCIAL

## 2019-08-13 VITALS
OXYGEN SATURATION: 100 % | WEIGHT: 247 LBS | BODY MASS INDEX: 37.44 KG/M2 | HEART RATE: 93 BPM | HEIGHT: 68 IN | RESPIRATION RATE: 18 BRPM | DIASTOLIC BLOOD PRESSURE: 77 MMHG | SYSTOLIC BLOOD PRESSURE: 116 MMHG

## 2019-08-13 DIAGNOSIS — I10 HYPERTENSION GOAL BP (BLOOD PRESSURE) < 140/90: Primary | ICD-10-CM

## 2019-08-13 DIAGNOSIS — E11.9 TYPE 2 DIABETES MELLITUS WITHOUT COMPLICATION, WITHOUT LONG-TERM CURRENT USE OF INSULIN (H): ICD-10-CM

## 2019-08-13 DIAGNOSIS — E66.01 MORBID OBESITY (H): ICD-10-CM

## 2019-08-13 PROCEDURE — 99213 OFFICE O/P EST LOW 20 MIN: CPT | Performed by: PHYSICIAN ASSISTANT

## 2019-08-13 RX ORDER — AMLODIPINE BESYLATE 10 MG/1
10 TABLET ORAL DAILY
COMMUNITY
Start: 2019-08-03 | End: 2019-08-29

## 2019-08-13 ASSESSMENT — MIFFLIN-ST. JEOR: SCORE: 1929.88

## 2019-08-13 NOTE — PROGRESS NOTES
Subjective     Gregg Lua is a 54 year old male who presents to clinic today for the following health issues:    History of Present Illness        Hypertension: He presents for follow up of hypertension.  He does not check blood pressure  regularly outside of the clinic. Outside blood pressures have been over 140/90. He follows a low salt diet.      ED/UC Followup:    Facility:  [facility]  Date of visit: 8/3/19  Reason for visit: Neurological symptoms, hypertension   Current Status: feeling better, back to normal.    No chest pain or short of breath. No headache or dizziness.    Feeling better since starting amlodipine.     Care Everywhere Reviewed    Gregg Lua is a 54 y.o. male with history of hypertension who presents to the emergency department via EMS for evaluation of lightheadedness and speech changes. The patient reports that his lisinopril was increased from 10-20 mg a week ago when he found he was taking a lower dose than he was supposed to. Since then, he has been experiencing lightheadedness and feeling like his tongue and lips are thick and therefore causing enunciation problems. He has had associated facial numbness and notes of ongoing left sided neck pain for the past month, despite receiving. He was seen at urgent care earlier today for this and subsequently placed on flexeril and norco. Given his lightheadedness and enunciation problems, he was referred to the emergency department for further evaluation. He denies recent falls, injuries, or trauma. No further complaints or concerns are voiced at this time.     Imaging:  MR Brain WO Contrast- TIA or Stroke:  No intracranial mass, mass effect or acute infarction. Mild chronic ischemic changes intracranially. Subtotal opacification right-sided mastoid air cells. See above for details and description.  Report per radiology.   Impression and Plan:  Gregg Lua is a 54 y.o. male who presented with unusual symptoms including some perhaps mild  speech deficits but no aphasia, slurring of the speech, or any neuro deficit here. He also had some left sided facial symptoms. No evidence of weakness or facial asymmetry or Guillain-barre. He is otherwise well appearing. He is hypertensive but his blood pressure actually went up when he was in the ED so this could be a bit of white count hypertension as well. We gave him a small dose of amlodipine. Sent him for an MRI that was fortunately negative. He had some trapezius pain on the left and this appears to truly be muscular pain. This has been ongoing for months and there is no evidence for stroke on his MRI so I do not think that he has a dissection. He is safe to be discharged home. I did suggest that maybe some of the unusual sensation in his tongue could be mild subclinical angioedema. I prescribed amlodipine 10 mg for him to take at home. He can transition from the ACE inhibitor to this to see if he gets better. There is nothing that merits admission to the hospital at this time. He does not appear to have any oral lingual edema that is evident upon exam. This all did seem to start when he went up to 20 mg on lisinopril per day. He does not appear to need emergent airway or intervention at this time. He will be subsequently discharged home. Return to the emergency department immediately with any sign of tongue or lip edema, chest pain, shortness of breath, vision loss, or any other new concerns.          Patient Active Problem List   Diagnosis     Type 2 diabetes mellitus without complication, without long-term current use of insulin (H)     Hypertension goal BP (blood pressure) < 140/90     Obesity (BMI 35.0-39.9) with comorbidity (H)     Past Surgical History:   Procedure Laterality Date     GALLBLADDER SURGERY         Social History     Tobacco Use     Smoking status: Never Smoker     Smokeless tobacco: Never Used   Substance Use Topics     Alcohol use: No     Frequency: Never     Family History   Problem  Relation Age of Onset     Diabetes Mother      Heart Disease Father      Diabetes Brother      Diabetes Sister      Diabetes Sister      Diabetes Brother          Current Outpatient Medications   Medication Sig Dispense Refill     alcohol swab prep pads Use to swab area of injection/lyle as directed. Pt tests two times daily. 200 each 3     amLODIPine (NORVASC) 10 MG tablet Take 10 mg by mouth daily       atorvastatin (LIPITOR) 10 MG tablet TAKE 1 TABLET BY MOUTH EVERY DAY 90 tablet 2     blood glucose (NO BRAND SPECIFIED) test strip Use to test blood sugar 2 times daily or as directed. To accompany: Blood Glucose Monitor Brands: per insurance. 200 strip 3     blood glucose calibration (NO BRAND SPECIFIED) solution To accompany: Blood Glucose Monitor Brands: per insurance. 1 Bottle 3     blood glucose monitoring (NO BRAND SPECIFIED) meter device kit Use to test blood sugar 2 times daily or as directed. Brand  per insurance. 1 kit 0     blood glucose monitoring (ONE TOUCH DELICA) lancets Use to test blood sugar 2 times daily or as directed.  Ok to substitute alternative if insurance prefers. 100 each 11     cyclobenzaprine (FLEXERIL) 5 MG tablet May take 1 or 2 tablets 3 times a day as needed for muscle spasm and pain. Sedating. Preferably take at bedtime 60 tablet 0     fluticasone (FLONASE) 50 MCG/ACT nasal spray Spray 1-2 sprays into both nostrils daily 1 g 11     lisinopril (PRINIVIL/ZESTRIL) 20 MG tablet TAKE 1 TABLET(20 MG) BY MOUTH DAILY 90 tablet 1     metFORMIN (GLUCOPHAGE-XR) 500 MG 24 hr tablet TAKE 4 TABLETS(2000 MG) BY MOUTH DAILY WITH DINNER 120 tablet 4     order for DME Equipment being ordered: BP monitor 1 Device 1     thin (NO BRAND SPECIFIED) lancets Use with lanceting device. To accompany: Blood Glucose Monitor Brands: per insurance. Pt tests two times daily. 200 each 3     UNKNOWN TO PATIENT Little blue one for diabetes       No Known Allergies  Recent Labs   Lab Test 04/11/19  1050   A1C 6.0*  "  LDL 35   HDL 40   TRIG 138   CR 1.02   GFRESTIMATED 82   GFRESTBLACK >90   POTASSIUM 4.3   TSH 1.78      BP Readings from Last 3 Encounters:   08/13/19 116/77   08/07/19 132/84   08/03/19 (!) 142/87    Wt Readings from Last 3 Encounters:   08/13/19 112 kg (247 lb)   08/03/19 114.8 kg (253 lb)   06/19/19 114.8 kg (253 lb)         Reviewed and updated as needed this visit by Provider  Tobacco  Allergies  Meds  Problems  Med Hx  Surg Hx  Fam Hx         Review of Systems   ROS COMP: Constitutional, HEENT, cardiovascular, pulmonary, gi and gu systems are negative, except as otherwise noted.      Objective    /77   Pulse 93   Resp 18   Ht 1.727 m (5' 8\")   Wt 112 kg (247 lb)   SpO2 100%   BMI 37.56 kg/m    Body mass index is 37.56 kg/m .  Physical Exam   GENERAL: healthy, alert and no distress  EYES: Eyes grossly normal to inspection, PERRL and conjunctivae and sclerae normal  HENT: ear canals and TM's normal, nose and mouth without ulcers or lesions  NECK: no adenopathy, no asymmetry, masses, or scars and thyroid normal to palpation  RESP: lungs clear to auscultation - no rales, rhonchi or wheezes  CV: regular rate and rhythm, normal S1 S2, no S3 or S4, no murmur, click or rub, no peripheral edema and peripheral pulses strong  MS: no gross musculoskeletal defects noted, no edema  NEURO: Normal strength and tone, mentation intact and speech normal  PSYCH: mentation appears normal, affect normal/bright    Diagnostic Test Results:  Labs reviewed in Epic        Assessment & Plan       ICD-10-CM    1. Hypertension goal BP (blood pressure) < 140/90 I10 BARIATRIC ADULT REFERRAL   2. Type 2 diabetes mellitus without complication, without long-term current use of insulin (H) E11.9 BARIATRIC ADULT REFERRAL   3. Obesity (BMI 35.0-39.9) with comorbidity (H) E66.01 BARIATRIC ADULT REFERRAL   1-2. Work on Healthy diet and exercise. Getting heart rate elevated for 30 mins most days of week. warning signs discussed. " "  Recheck blood pressure in 6 months.   3. Referral placed.      BMI:   Estimated body mass index is 37.56 kg/m  as calculated from the following:    Height as of this encounter: 1.727 m (5' 8\").    Weight as of this encounter: 112 kg (247 lb).   Weight management plan: Patient was referred to their PCP to discuss a diet and exercise plan.      Return in about 6 months (around 2/13/2020) for recheck.    Mohit Randall PA-C  [facility]      "

## 2019-08-29 DIAGNOSIS — I10 HYPERTENSION GOAL BP (BLOOD PRESSURE) < 140/90: Primary | ICD-10-CM

## 2019-08-29 RX ORDER — AMLODIPINE BESYLATE 10 MG/1
TABLET ORAL
Qty: 90 TABLET | Refills: 1 | Status: SHIPPED | OUTPATIENT
Start: 2019-08-29 | End: 2019-10-10

## 2019-08-29 NOTE — TELEPHONE ENCOUNTER
Routing refill request to provider for review/approval because:  Medication is reported/historical  Erendira Jiang BSN, RN

## 2019-09-23 ENCOUNTER — TELEPHONE (OUTPATIENT)
Dept: FAMILY MEDICINE | Facility: CLINIC | Age: 54
End: 2019-09-23

## 2019-09-23 NOTE — LETTER
September 23, 2019      Gregg Lua  [address] [facility] [id number]        Dear [facility] Patient,    Your provider reviewed your medical record and found that you are due for colorectal cancer screening. Having regular screenings helps detect cancer early.  In the past, you have completed a FIT test (Fecal Immunochemical Test) for your colorectal cancer screening. This test looks for blood in your stool and is done once a year.   You have a few options available, the FIT testing you have done in the past or the following below:  A Cologuard test may be completed at home. Cologuard uses a DNA marker in your stool to detect colon cancer and some precancerous polyps. This test is to be completed every three years.    Another option for colorectal cancer screening is a colonoscopy test. A colonoscopy is a procedure that is performed to see the inside of the and rectum by using a long, thin, and flexible tube with a tiny video camera and light at the end. This test is done every 10 years if it is normal.    Please choose one of these options. If you would like to have one of these tests ordered, please call us at [phone] or send us a message via [employer].    If you have had a colorectal cancer screening done outside of [facility] please let us know so we can update your medical record.    Please let us know if you have any questions.    Sincerely,  [facility]

## 2019-09-26 ENCOUNTER — ALLIED HEALTH/NURSE VISIT (OUTPATIENT)
Dept: FAMILY MEDICINE | Facility: CLINIC | Age: 54
End: 2019-09-26
Payer: COMMERCIAL

## 2019-09-26 VITALS — SYSTOLIC BLOOD PRESSURE: 128 MMHG | DIASTOLIC BLOOD PRESSURE: 78 MMHG | HEART RATE: 72 BPM

## 2019-09-26 DIAGNOSIS — Z01.30 BLOOD PRESSURE CHECK: Primary | ICD-10-CM

## 2019-09-26 PROCEDURE — 99207 ZZC NO CHARGE NURSE ONLY: CPT | Performed by: PHYSICIAN ASSISTANT

## 2019-09-26 NOTE — PROGRESS NOTES
Gregg Lua was evaluated at [facility] on September 26, 2019 at which time his blood pressure was:    BP Readings from Last 3 Encounters:   09/26/19 128/78   08/13/19 116/77   08/07/19 132/84     Pulse Readings from Last 3 Encounters:   09/26/19 72   08/13/19 93   08/07/19 84       Reviewed lifestyle modifications for blood pressure control and reduction: including making healthy food choices, managing weight, getting regular exercise, smoking cessation, reducing alcohol consumption, monitoring blood pressure regularly.     Symptoms: None    BP Goal:< 140/90 mmHg    BP Assessment:  BP at goal    Potential Reasons for BP too high: NA - Not applicable    BP Follow-Up Plan: Recheck BP in 6 months at pharmacy    Recommendation to Provider: None    Note completed by: Taqueria Addison RPh.  [facility]  [phone]

## 2019-10-09 NOTE — PROGRESS NOTES
Subjective     Gregg Lua is a 54 year old male who presents to clinic today with a past medical history of hypertension and high blood pressure and his obstructive sleep apnea.  Here today for checkup and episodes of lightheadedness.    History of Present Illness        Hypertension: He presents for follow up of hypertension.  He does not check blood pressure  regularly outside of the clinic. Outside blood pressures have been over 140/90. He follows a low salt diet.      Was feeling lightheaded but has felt better for the last few days - did cut back on coffee - was drinking americano's (which is like drinking 3 shot of espresso) been using them for 2-3 wks. he has since stopped the drinks and is feeling much better.    History of hypertension which is stable.  He denies any chest pain or shortness of breath.    History of diabetes which she states his sugar sugars are well controlled.  He denies any hypoglycemic episodes.  He denies any numbness or tingling.  He is fasting today.    He has a history of obstructive sleep apnea and is having troubles with the mask and questions using the nasal mask.       Patient Active Problem List   Diagnosis     Type 2 diabetes mellitus without complication, without long-term current use of insulin (H)     Hypertension goal BP (blood pressure) < 140/90     Obesity (BMI 35.0-39.9) with comorbidity (H)     DANIELLE (obstructive sleep apnea)     Past Surgical History:   Procedure Laterality Date     GALLBLADDER SURGERY         Social History     Tobacco Use     Smoking status: Never Smoker     Smokeless tobacco: Never Used   Substance Use Topics     Alcohol use: No     Frequency: Never     Family History   Problem Relation Age of Onset     Diabetes Mother      Heart Disease Father      Diabetes Brother      Diabetes Sister      Diabetes Sister      Diabetes Brother          Current Outpatient Medications   Medication Sig Dispense Refill     alcohol swab prep pads Use to swab area of  injection/lyle as directed. Pt tests two times daily. 200 each 3     amLODIPine (NORVASC) 10 MG tablet Take 1 tablet (10 mg) by mouth daily 90 tablet 1     atorvastatin (LIPITOR) 10 MG tablet TAKE 1 TABLET BY MOUTH EVERY DAY 90 tablet 2     blood glucose (NO BRAND SPECIFIED) test strip Use to test blood sugar 2 times daily or as directed. To accompany: Blood Glucose Monitor Brands: per insurance. 200 strip 3     blood glucose calibration (NO BRAND SPECIFIED) solution To accompany: Blood Glucose Monitor Brands: per insurance. 1 Bottle 3     blood glucose monitoring (NO BRAND SPECIFIED) meter device kit Use to test blood sugar 2 times daily or as directed. Brand  per insurance. 1 kit 0     blood glucose monitoring (ONE TOUCH DELICA) lancets Use to test blood sugar 2 times daily or as directed.  Ok to substitute alternative if insurance prefers. 100 each 11     cyclobenzaprine (FLEXERIL) 5 MG tablet May take 1 or 2 tablets 3 times a day as needed for muscle spasm and pain. Sedating. Preferably take at bedtime 60 tablet 0     fluticasone (FLONASE) 50 MCG/ACT nasal spray Spray 1-2 sprays into both nostrils daily 1 g 11     lisinopril (PRINIVIL/ZESTRIL) 20 MG tablet Take 1 tablet (20 mg) by mouth daily 90 tablet 1     metFORMIN (GLUCOPHAGE-XR) 500 MG 24 hr tablet TAKE 4 TABLETS(2000 MG) BY MOUTH DAILY WITH DINNER 360 tablet 1     order for DME Equipment being ordered: BP monitor 1 Device 1     thin (NO BRAND SPECIFIED) lancets Use with lanceting device. To accompany: Blood Glucose Monitor Brands: per insurance. Pt tests two times daily. 200 each 3     UNKNOWN TO PATIENT Little blue one for diabetes       No Known Allergies  Recent Labs   Lab Test 10/10/19  1244 04/11/19  1050   A1C 6.7* 6.0*   LDL  --  35   HDL  --  40   TRIG  --  138   CR  --  1.02   GFRESTIMATED  --  82   GFRESTBLACK  --  >90   POTASSIUM  --  4.3   TSH  --  1.78      BP Readings from Last 3 Encounters:   10/10/19 128/83   09/26/19 128/78   08/13/19  "116/77    Wt Readings from Last 3 Encounters:   10/10/19 113.9 kg (251 lb)   08/13/19 112 kg (247 lb)   08/03/19 114.8 kg (253 lb)         Reviewed and updated as needed this visit by Provider  Tobacco  Allergies  Meds  Problems  Med Hx  Surg Hx  Fam Hx         Review of Systems   ROS COMP: Constitutional, HEENT, cardiovascular, pulmonary, gi and gu systems are negative, except as otherwise noted.      Objective    /83   Pulse 90   Temp 98.4  F (36.9  C) (Oral)   Resp 16   Ht 1.727 m (5' 8\")   Wt 113.9 kg (251 lb)   SpO2 100%   BMI 38.16 kg/m    Body mass index is 38.16 kg/m .  Physical Exam   GENERAL: healthy, alert and no distress  EYES: Eyes grossly normal to inspection, PERRL and conjunctivae and sclerae normal  HENT: ear canals and TM's normal, nose and mouth without ulcers or lesions  NECK: no adenopathy, no asymmetry, masses, or scars and thyroid normal to palpation  RESP: lungs clear to auscultation - no rales, rhonchi or wheezes  CV: regular rate and rhythm, normal S1 S2, no S3 or S4, no murmur, click or rub, no peripheral edema and peripheral pulses strong  ABDOMEN: soft, nontender, no hepatosplenomegaly, no masses and bowel sounds normal  NEURO: Normal strength and tone, mentation intact and speech normal  PSYCH: mentation appears normal, affect normal/bright    Diagnostic Test Results:  Labs reviewed in Epic  Unresulted Labs Ordered in the Past 30 Days of this Admission     Date and Time Order Name Status Description    10/10/2019 1237 COMPREHENSIVE METABOLIC PANEL In process          Results for orders placed or performed in visit on 10/10/19   HEMOGLOBIN A1C   Result Value Ref Range    Hemoglobin A1C 6.7 (H) 0 - 5.6 %   CBC with platelets differential   Result Value Ref Range    WBC 5.8 4.0 - 11.0 10e9/L    RBC Count 4.42 4.4 - 5.9 10e12/L    Hemoglobin 13.5 13.3 - 17.7 g/dL    Hematocrit 40.2 40.0 - 53.0 %    MCV 91 78 - 100 fl    MCH 30.5 26.5 - 33.0 pg    MCHC 33.6 31.5 - 36.5 " "g/dL    RDW 12.2 10.0 - 15.0 %    Platelet Count 187 150 - 450 10e9/L    % Neutrophils 39.9 %    % Lymphocytes 48.4 %    % Monocytes 9.5 %    % Eosinophils 1.9 %    % Basophils 0.3 %    Absolute Neutrophil 2.3 1.6 - 8.3 10e9/L    Absolute Lymphocytes 2.8 0.8 - 5.3 10e9/L    Absolute Monocytes 0.6 0.0 - 1.3 10e9/L    Absolute Eosinophils 0.1 0.0 - 0.7 10e9/L    Absolute Basophils 0.0 0.0 - 0.2 10e9/L    Diff Method Automated Method             Assessment & Plan       ICD-10-CM    1. Lightheadedness R42    2. Hypertension goal BP (blood pressure) < 140/90 I10 amLODIPine (NORVASC) 10 MG tablet     lisinopril (PRINIVIL/ZESTRIL) 20 MG tablet     Comprehensive metabolic panel     CBC with platelets differential   3. Type 2 diabetes mellitus without complication, without long-term current use of insulin (H) E11.9 HEMOGLOBIN A1C     metFORMIN (GLUCOPHAGE-XR) 500 MG 24 hr tablet     Comprehensive metabolic panel     CBC with platelets differential   4. DANIELLE (obstructive sleep apnea) G47.33 SLEEP EVALUATION & MANAGEMENT REFERRAL - [facility] [place]  [phone] (Age 15 and up)   1.  Suspect patient's symptoms were due to high levels of caffeine use daily.  To the fact that he has stopped this and his symptoms have improved we will have him continue not using caffeinated beverages.  Warning signs discussed.  2-3.  Medical conditions are stable.  Medications refilled.  Recheck things in 6 months.  4.  We will have him follow-up with sleeps clinic to talk about adjustments of his mask.      BMI:   Estimated body mass index is 38.16 kg/m  as calculated from the following:    Height as of this encounter: 1.727 m (5' 8\").    Weight as of this encounter: 113.9 kg (251 lb).   Weight management plan: Discussed healthy diet and exercise guidelines    Return in about 6 months (around 4/10/2020) for recheck.    Mohit Randall PA-C  [facility]      "

## 2019-10-10 ENCOUNTER — OFFICE VISIT (OUTPATIENT)
Dept: FAMILY MEDICINE | Facility: CLINIC | Age: 54
End: 2019-10-10
Payer: COMMERCIAL

## 2019-10-10 VITALS
WEIGHT: 251 LBS | SYSTOLIC BLOOD PRESSURE: 128 MMHG | RESPIRATION RATE: 16 BRPM | HEIGHT: 68 IN | HEART RATE: 90 BPM | DIASTOLIC BLOOD PRESSURE: 83 MMHG | TEMPERATURE: 98.4 F | BODY MASS INDEX: 38.04 KG/M2 | OXYGEN SATURATION: 100 %

## 2019-10-10 DIAGNOSIS — G47.33 OSA (OBSTRUCTIVE SLEEP APNEA): ICD-10-CM

## 2019-10-10 DIAGNOSIS — R42 LIGHTHEADEDNESS: Primary | ICD-10-CM

## 2019-10-10 DIAGNOSIS — E11.9 TYPE 2 DIABETES MELLITUS WITHOUT COMPLICATION, WITHOUT LONG-TERM CURRENT USE OF INSULIN (H): ICD-10-CM

## 2019-10-10 DIAGNOSIS — I10 HYPERTENSION GOAL BP (BLOOD PRESSURE) < 140/90: ICD-10-CM

## 2019-10-10 LAB
ALBUMIN SERPL-MCNC: 4.1 G/DL (ref 3.4–5)
ALP SERPL-CCNC: 62 U/L (ref 40–150)
ALT SERPL W P-5'-P-CCNC: 22 U/L (ref 0–70)
ANION GAP SERPL CALCULATED.3IONS-SCNC: 4 MMOL/L (ref 3–14)
AST SERPL W P-5'-P-CCNC: 18 U/L (ref 0–45)
BASOPHILS # BLD AUTO: 0 10E9/L (ref 0–0.2)
BASOPHILS NFR BLD AUTO: 0.3 %
BILIRUB SERPL-MCNC: 0.3 MG/DL (ref 0.2–1.3)
BUN SERPL-MCNC: 13 MG/DL (ref 7–30)
CALCIUM SERPL-MCNC: 9.5 MG/DL (ref 8.5–10.1)
CHLORIDE SERPL-SCNC: 104 MMOL/L (ref 94–109)
CO2 SERPL-SCNC: 28 MMOL/L (ref 20–32)
CREAT SERPL-MCNC: 0.98 MG/DL (ref 0.66–1.25)
DIFFERENTIAL METHOD BLD: NORMAL
EOSINOPHIL # BLD AUTO: 0.1 10E9/L (ref 0–0.7)
EOSINOPHIL NFR BLD AUTO: 1.9 %
ERYTHROCYTE [DISTWIDTH] IN BLOOD BY AUTOMATED COUNT: 12.2 % (ref 10–15)
GFR SERPL CREATININE-BSD FRML MDRD: 86 ML/MIN/{1.73_M2}
GLUCOSE SERPL-MCNC: 106 MG/DL (ref 70–99)
HBA1C MFR BLD: 6.7 % (ref 0–5.6)
HCT VFR BLD AUTO: 40.2 % (ref 40–53)
HGB BLD-MCNC: 13.5 G/DL (ref 13.3–17.7)
LYMPHOCYTES # BLD AUTO: 2.8 10E9/L (ref 0.8–5.3)
LYMPHOCYTES NFR BLD AUTO: 48.4 %
MCH RBC QN AUTO: 30.5 PG (ref 26.5–33)
MCHC RBC AUTO-ENTMCNC: 33.6 G/DL (ref 31.5–36.5)
MCV RBC AUTO: 91 FL (ref 78–100)
MONOCYTES # BLD AUTO: 0.6 10E9/L (ref 0–1.3)
MONOCYTES NFR BLD AUTO: 9.5 %
NEUTROPHILS # BLD AUTO: 2.3 10E9/L (ref 1.6–8.3)
NEUTROPHILS NFR BLD AUTO: 39.9 %
PLATELET # BLD AUTO: 187 10E9/L (ref 150–450)
POTASSIUM SERPL-SCNC: 4.5 MMOL/L (ref 3.4–5.3)
PROT SERPL-MCNC: 8 G/DL (ref 6.8–8.8)
RBC # BLD AUTO: 4.42 10E12/L (ref 4.4–5.9)
SODIUM SERPL-SCNC: 136 MMOL/L (ref 133–144)
WBC # BLD AUTO: 5.8 10E9/L (ref 4–11)

## 2019-10-10 PROCEDURE — 85025 COMPLETE CBC W/AUTO DIFF WBC: CPT | Performed by: PHYSICIAN ASSISTANT

## 2019-10-10 PROCEDURE — 83036 HEMOGLOBIN GLYCOSYLATED A1C: CPT | Performed by: PHYSICIAN ASSISTANT

## 2019-10-10 PROCEDURE — 80053 COMPREHEN METABOLIC PANEL: CPT | Performed by: PHYSICIAN ASSISTANT

## 2019-10-10 PROCEDURE — 99214 OFFICE O/P EST MOD 30 MIN: CPT | Performed by: PHYSICIAN ASSISTANT

## 2019-10-10 PROCEDURE — 36415 COLL VENOUS BLD VENIPUNCTURE: CPT | Performed by: PHYSICIAN ASSISTANT

## 2019-10-10 RX ORDER — AMLODIPINE BESYLATE 10 MG/1
10 TABLET ORAL DAILY
Qty: 90 TABLET | Refills: 1 | Status: SHIPPED | OUTPATIENT
Start: 2019-10-10 | End: 2020-08-28

## 2019-10-10 RX ORDER — LISINOPRIL 20 MG/1
20 TABLET ORAL DAILY
Qty: 90 TABLET | Refills: 1 | Status: SHIPPED | OUTPATIENT
Start: 2019-10-10 | End: 2020-07-17

## 2019-10-10 RX ORDER — METFORMIN HCL 500 MG
TABLET, EXTENDED RELEASE 24 HR ORAL
Qty: 360 TABLET | Refills: 1 | Status: SHIPPED | OUTPATIENT
Start: 2019-10-10 | End: 2020-04-30

## 2019-10-10 ASSESSMENT — MIFFLIN-ST. JEOR: SCORE: 1948.03

## 2019-10-10 NOTE — LETTER
October 11, 2019    Gregg Lua  [address] [address] [id number]            Dear Gregg,    All of your labs were normal/ stable for you.     Below is a copy of the results.  It was a pleasure to see you at your last appointment.    If you have any questions or concerns, please call myself or my nurse at [phone].    Sincerely,    Mohit Randall PA-C  /angeli    Results for orders placed or performed in visit on 10/10/19   HEMOGLOBIN A1C   Result Value Ref Range    Hemoglobin A1C 6.7 (H) 0 - 5.6 %   Comprehensive metabolic panel   Result Value Ref Range    Sodium 136 133 - 144 mmol/L    Potassium 4.5 3.4 - 5.3 mmol/L    Chloride 104 94 - 109 mmol/L    Carbon Dioxide 28 20 - 32 mmol/L    Anion Gap 4 3 - 14 mmol/L    Glucose 106 (H) 70 - 99 mg/dL    Urea Nitrogen 13 7 - 30 mg/dL    Creatinine 0.98 0.66 - 1.25 mg/dL    GFR Estimate 86 >60 mL/min/[1.73_m2]    GFR Estimate If Black >90 >60 mL/min/[1.73_m2]    Calcium 9.5 8.5 - 10.1 mg/dL    Bilirubin Total 0.3 0.2 - 1.3 mg/dL    Albumin 4.1 3.4 - 5.0 g/dL    Protein Total 8.0 6.8 - 8.8 g/dL    Alkaline Phosphatase 62 40 - 150 U/L    ALT 22 0 - 70 U/L    AST 18 0 - 45 U/L   CBC with platelets differential   Result Value Ref Range    WBC 5.8 4.0 - 11.0 10e9/L    RBC Count 4.42 4.4 - 5.9 10e12/L    Hemoglobin 13.5 13.3 - 17.7 g/dL    Hematocrit 40.2 40.0 - 53.0 %    MCV 91 78 - 100 fl    MCH 30.5 26.5 - 33.0 pg    MCHC 33.6 31.5 - 36.5 g/dL    RDW 12.2 10.0 - 15.0 %    Platelet Count 187 150 - 450 10e9/L    % Neutrophils 39.9 %    % Lymphocytes 48.4 %    % Monocytes 9.5 %    % Eosinophils 1.9 %    % Basophils 0.3 %    Absolute Neutrophil 2.3 1.6 - 8.3 10e9/L    Absolute Lymphocytes 2.8 0.8 - 5.3 10e9/L    Absolute Monocytes 0.6 0.0 - 1.3 10e9/L    Absolute Eosinophils 0.1 0.0 - 0.7 10e9/L    Absolute Basophils 0.0 0.0 - 0.2 10e9/L    Diff Method Automated Method

## 2019-11-02 DIAGNOSIS — E11.9 TYPE 2 DIABETES MELLITUS WITHOUT COMPLICATION, WITHOUT LONG-TERM CURRENT USE OF INSULIN (H): ICD-10-CM

## 2019-11-04 RX ORDER — METFORMIN HCL 500 MG
TABLET, EXTENDED RELEASE 24 HR ORAL
Qty: 120 TABLET | Refills: 0 | OUTPATIENT
Start: 2019-11-04

## 2019-11-06 ENCOUNTER — TELEPHONE (OUTPATIENT)
Dept: FAMILY MEDICINE | Facility: CLINIC | Age: 54
End: 2019-11-06

## 2019-12-27 ENCOUNTER — OFFICE VISIT (OUTPATIENT)
Dept: URGENT CARE | Facility: URGENT CARE | Age: 54
End: 2019-12-27
Payer: COMMERCIAL

## 2019-12-27 ENCOUNTER — ALLIED HEALTH/NURSE VISIT (OUTPATIENT)
Dept: FAMILY MEDICINE | Facility: CLINIC | Age: 54
End: 2019-12-27
Payer: COMMERCIAL

## 2019-12-27 VITALS
SYSTOLIC BLOOD PRESSURE: 129 MMHG | TEMPERATURE: 97.7 F | DIASTOLIC BLOOD PRESSURE: 79 MMHG | HEART RATE: 83 BPM | OXYGEN SATURATION: 100 %

## 2019-12-27 VITALS — SYSTOLIC BLOOD PRESSURE: 132 MMHG | DIASTOLIC BLOOD PRESSURE: 78 MMHG | HEART RATE: 76 BPM

## 2019-12-27 DIAGNOSIS — Z01.30 BP CHECK: Primary | ICD-10-CM

## 2019-12-27 DIAGNOSIS — J20.9 ACUTE BRONCHITIS WITH SYMPTOMS > 10 DAYS: Primary | ICD-10-CM

## 2019-12-27 PROCEDURE — 99207 ZZC NO CHARGE NURSE ONLY: CPT | Performed by: PHYSICIAN ASSISTANT

## 2019-12-27 PROCEDURE — 99213 OFFICE O/P EST LOW 20 MIN: CPT | Performed by: NURSE PRACTITIONER

## 2019-12-27 RX ORDER — AZITHROMYCIN 250 MG/1
TABLET, FILM COATED ORAL
Qty: 6 TABLET | Refills: 0 | Status: SHIPPED | OUTPATIENT
Start: 2019-12-27 | End: 2020-01-01

## 2019-12-27 RX ORDER — ALBUTEROL SULFATE 90 UG/1
2 AEROSOL, METERED RESPIRATORY (INHALATION) EVERY 6 HOURS
Qty: 1 INHALER | Refills: 0 | Status: SHIPPED | OUTPATIENT
Start: 2019-12-27 | End: 2022-08-25

## 2019-12-27 ASSESSMENT — ENCOUNTER SYMPTOMS
WHEEZING: 1
MUSCULOSKELETAL NEGATIVE: 1
HEADACHES: 1
FEVER: 0
COUGH: 1
SPUTUM PRODUCTION: 1
DIZZINESS: 1
GASTROINTESTINAL NEGATIVE: 1
CARDIOVASCULAR NEGATIVE: 1

## 2019-12-27 NOTE — PATIENT INSTRUCTIONS

## 2019-12-27 NOTE — PROGRESS NOTES
Gregg Lua was evaluated at [facility] on December 27, 2019 at which time his blood pressure was:    BP Readings from Last 3 Encounters:   12/27/19 132/78   10/10/19 128/83   09/26/19 128/78     Pulse Readings from Last 3 Encounters:   12/27/19 76   10/10/19 90   09/26/19 72       Reviewed lifestyle modifications for blood pressure control and reduction: including making healthy food choices, managing weight, getting regular exercise, smoking cessation, reducing alcohol consumption, monitoring blood pressure regularly.     Symptoms: Other: coughing    BP Goal:< 140/90 mmHg    BP Assessment:  BP at goal    Potential Reasons for BP too high: NA - Not applicable    BP Follow-Up Plan: Recheck BP in 6 months at pharmacy    Recommendation to Provider: patient indicates he coughs regularly when lying down  Note completed by:[facility]    [phone]

## 2019-12-28 NOTE — PROGRESS NOTES
HPI  Patient is a 54-year-old male who presents with chief complaint of cough.  He reports that the symptoms have been present for greater than 10 days and that he is occasionally productive with sputum especially in the morning.  He has had general fatigue and some myalgias accompanying his symptoms.  He denies significant shortness of breath but has had episodes of mild wheezing.  He has been using an over-the-counter cough medication with limited relief.  He reports that this episode has increased his blood sugar slightly though he has remained fairly well controlled.    Review of Systems   Constitutional: Positive for malaise/fatigue. Negative for fever.   HENT: Negative.    Respiratory: Positive for cough, sputum production and wheezing.    Cardiovascular: Negative.    Gastrointestinal: Negative.    Musculoskeletal: Negative.    Skin: Negative.    Neurological: Positive for dizziness and headaches.     Past Medical History:   Diagnosis Date     Diabetes mellitus, type 2 (H)      High cholesterol      Hypertension      Patient Active Problem List   Diagnosis     Type 2 diabetes mellitus without complication, without long-term current use of insulin (H)     Hypertension goal BP (blood pressure) < 140/90     Obesity (BMI 35.0-39.9) with comorbidity (H)     DANIELLE (obstructive sleep apnea)     Acute bronchitis with symptoms > 10 days      Past Surgical History:   Procedure Laterality Date     GALLBLADDER SURGERY        No Known Allergies  Current Outpatient Medications   Medication     albuterol (PROAIR HFA/PROVENTIL HFA/VENTOLIN HFA) 108 (90 Base) MCG/ACT inhaler     alcohol swab prep pads     amLODIPine (NORVASC) 10 MG tablet     atorvastatin (LIPITOR) 10 MG tablet     azithromycin (ZITHROMAX) 250 MG tablet     blood glucose (NO BRAND SPECIFIED) test strip     blood glucose calibration (NO BRAND SPECIFIED) solution     blood glucose monitoring (NO BRAND SPECIFIED) meter device kit     blood glucose monitoring (ONE  TOUCH DELICA) lancets     cyclobenzaprine (FLEXERIL) 5 MG tablet     fluticasone (FLONASE) 50 MCG/ACT nasal spray     lisinopril (PRINIVIL/ZESTRIL) 20 MG tablet     metFORMIN (GLUCOPHAGE-XR) 500 MG 24 hr tablet     order for DME     thin (NO BRAND SPECIFIED) lancets     UNKNOWN TO PATIENT     No current facility-administered medications for this visit.          Physical Exam  Vitals signs and nursing note reviewed.   Constitutional:       General: He is not in acute distress.     Appearance: He is not toxic-appearing.      Comments: /79   Pulse 83   Temp 97.7  F (36.5  C) (Tympanic)   SpO2 100%      HENT:      Head: Normocephalic.      Right Ear: Tympanic membrane normal.      Left Ear: Tympanic membrane normal.      Mouth/Throat:      Pharynx: Oropharynx is clear.   Eyes:      Conjunctiva/sclera: Conjunctivae normal.   Neck:      Musculoskeletal: Normal range of motion.   Cardiovascular:      Rate and Rhythm: Normal rate.      Heart sounds: Normal heart sounds.   Pulmonary:      Effort: Pulmonary effort is normal.      Breath sounds: Normal breath sounds.   Abdominal:      General: Bowel sounds are normal.   Musculoskeletal: Normal range of motion.   Lymphadenopathy:      Cervical: No cervical adenopathy.   Skin:     General: Skin is warm and dry.      Capillary Refill: Capillary refill takes less than 2 seconds.   Neurological:      Mental Status: He is alert and oriented to person, place, and time.             Assessment:  1. Acute bronchitis with symptoms > 10 days        Plan:  Orders Placed This Encounter     albuterol (PROAIR HFA/PROVENTIL HFA/VENTOLIN HFA) 108 (90 Base) MCG/ACT inhaler     azithromycin (ZITHROMAX) 250 MG tablet   Tylenol or Ibuprofen as directed on package for pain or fever  Instructions regarding self-care of patient/child reviewed.   Written instructions provided in after visit summary and reviewed.  Patient instructed to see primary care provider for new or persistent symptoms.    Red flag symptoms reviewed and patient has been instructed to seek emergent care  Please contact pharmacy for medication questions.  Patient instructed to take medications as directed on package.      Tamika Gonzalez, DNP, APRN, CNP

## 2020-03-01 DIAGNOSIS — E11.9 TYPE 2 DIABETES MELLITUS WITHOUT COMPLICATION, WITHOUT LONG-TERM CURRENT USE OF INSULIN (H): ICD-10-CM

## 2020-03-02 RX ORDER — ATORVASTATIN CALCIUM 10 MG/1
TABLET, FILM COATED ORAL
Qty: 90 TABLET | Refills: 0 | Status: SHIPPED | OUTPATIENT
Start: 2020-03-02 | End: 2020-06-05

## 2020-03-02 NOTE — TELEPHONE ENCOUNTER
"Requested Prescriptions   Signed Prescriptions Disp Refills    atorvastatin (LIPITOR) 10 MG tablet 90 tablet 0     Sig: TAKE 1 TABLET BY MOUTH EVERY DAY       [facility] Protocol Passed - 3/1/2020  5:27 AM        Passed - LDL on file in past 12 months     Recent Labs   Lab Test 04/11/19  1050   LDL 35             Passed - No abnormal creatine kinase in past 12 months     No lab results found.             Passed - Recent (12 mo) or future (30 days) visit within the authorizing provider's specialty     Patient has had an office visit with the authorizing provider or a provider within the authorizing providers department within the previous 12 mos or has a future within next 30 days. See \"Patient Info\" tab in inbasket, or \"Choose Columns\" in Meds & Orders section of the refill encounter.              Passed - Medication is active on med list        Passed - Patient is age 18 or older          "

## 2020-04-30 DIAGNOSIS — E11.9 TYPE 2 DIABETES MELLITUS WITHOUT COMPLICATION, WITHOUT LONG-TERM CURRENT USE OF INSULIN (H): ICD-10-CM

## 2020-04-30 RX ORDER — METFORMIN HCL 500 MG
TABLET, EXTENDED RELEASE 24 HR ORAL
Qty: 360 TABLET | Refills: 0 | Status: SHIPPED | OUTPATIENT
Start: 2020-04-30 | End: 2020-07-29

## 2020-04-30 NOTE — TELEPHONE ENCOUNTER
Recommend Virtual Visit (Phone visit, Video visit or E-Visit)  And fasting labs.    Mohit Randall PA-C

## 2020-04-30 NOTE — LETTER
April 30, 2020    Gregg Lua  [address] [address] [facility] [id number]    Dear Gregg,       We recently received a refill request for metFORMIN (GLUCOPHAGE-XR) 500 MG 24 hr tablet .  We have refilled this for a one time 90 day supply only because you are due for a:    Diabetes virtual visit (Phone visit, video or E-visit)      Please call at your earliest convenience so that there will not be a delay with your future refills.          Thank you,   Your [facility] Team/  [phone]

## 2020-05-05 ENCOUNTER — VIRTUAL VISIT (OUTPATIENT)
Dept: FAMILY MEDICINE | Facility: OTHER | Age: 55
End: 2020-05-05

## 2020-05-05 ENCOUNTER — OFFICE VISIT (OUTPATIENT)
Dept: FAMILY MEDICINE | Facility: CLINIC | Age: 55
End: 2020-05-05
Payer: COMMERCIAL

## 2020-05-05 VITALS
DIASTOLIC BLOOD PRESSURE: 90 MMHG | HEART RATE: 84 BPM | OXYGEN SATURATION: 99 % | SYSTOLIC BLOOD PRESSURE: 132 MMHG | RESPIRATION RATE: 14 BRPM | TEMPERATURE: 97.9 F

## 2020-05-05 DIAGNOSIS — J30.2 SEASONAL ALLERGIC RHINITIS, UNSPECIFIED TRIGGER: ICD-10-CM

## 2020-05-05 DIAGNOSIS — R51.9 ACUTE NONINTRACTABLE HEADACHE, UNSPECIFIED HEADACHE TYPE: Primary | ICD-10-CM

## 2020-05-05 DIAGNOSIS — I10 HYPERTENSION GOAL BP (BLOOD PRESSURE) < 140/90: ICD-10-CM

## 2020-05-05 PROCEDURE — 99214 OFFICE O/P EST MOD 30 MIN: CPT | Performed by: NURSE PRACTITIONER

## 2020-05-05 ASSESSMENT — PAIN SCALES - GENERAL: PAINLEVEL: NO PAIN (0)

## 2020-05-05 NOTE — PATIENT INSTRUCTIONS
Please make appointment for COVID testing as recommended by [facility] visit.      Headache symptoms may be related to possible COVID infection or other cause such as your new glasses and/or allergies.     Blood pressure is slightly elevated today (132/90).  Consider taking your blood pressure medication in the morning instead of at night.  Other vital signs are stable- no fever, heart rate and oxygen normal.    For allergies, consider over the counter product such as Claritin (without the decongestant) or Zyrtec.  Could also try Flonase nasal spray.        Please schedule a follow-up visit with your primary care provider for a diabetes and hypertension follow-up visit with labs.     Please return if worsening or failure to improve.

## 2020-05-05 NOTE — PROGRESS NOTES
"Subjective     Gregg Lua is a 54 year old male who presents to clinic today for the following health issues:    HPI   Patient reports he has been having some headaches. He recently bought some new glasses, \"cheaters\" for reading, and he is wondering if headaches are related to the glasses.  Has not had a recent formal eye exam.   Headaches are mild, frontal.  Seem worse after taking off glasses.  No visual changes, nausea, vomiting, photo/phonophobia.  Denies history of migraines. No numbness, tingling, motor weakness, confusion or troubles with speech.  He also thinks he is having some seasonal allergies.  Some sneezing and runny nose.  Using Allegra but doesn't seem to be working very well. Patient denies any recent fevers, chills, malaise, fatigue, chest pain, cough or shortness of breath.  His wife is a health care worker and has a sore throat.  She was told to get testing for COVID-19.  Patient also had an [facility] virtual visit today and was told to get screened for COVID-19 as well, due to his headache and wife's symptoms.  Patient has not scheduled testing appointment yet.  Testing is not done at this clinic and will need to be arranged for other location.  Patient was advised to be seen in the in-person because of his headaches, it was recommended he get his blood pressure checked.  Patient is on amlodipine 10 mg and lisinopril 20 mg daily for hypertension.  He takes his medication at night.  Last dose was last night. Does not check BP at home. Patient is over due for follow-up with PCP for diabetes and hypertension.      Patient Active Problem List   Diagnosis     Type 2 diabetes mellitus without complication, without long-term current use of insulin (H)     Hypertension goal BP (blood pressure) < 140/90     Obesity (BMI 35.0-39.9) with comorbidity (H)     DANIELLE (obstructive sleep apnea)     Acute bronchitis with symptoms > 10 days     Past Surgical History:   Procedure Laterality Date     GALLBLADDER " SURGERY         Social History     Tobacco Use     Smoking status: Never Smoker     Smokeless tobacco: Never Used   Substance Use Topics     Alcohol use: No     Frequency: Never     Family History   Problem Relation Age of Onset     Diabetes Mother      Heart Disease Father      Diabetes Brother      Diabetes Sister      Diabetes Sister      Diabetes Brother          Current Outpatient Medications   Medication Sig Dispense Refill     amLODIPine (NORVASC) 10 MG tablet Take 1 tablet (10 mg) by mouth daily 90 tablet 1     atorvastatin (LIPITOR) 10 MG tablet TAKE 1 TABLET BY MOUTH EVERY DAY 90 tablet 0     blood glucose (NO BRAND SPECIFIED) test strip Use to test blood sugar 2 times daily or as directed. To accompany: Blood Glucose Monitor Brands: per insurance. 200 strip 3     blood glucose calibration (NO BRAND SPECIFIED) solution To accompany: Blood Glucose Monitor Brands: per insurance. 1 Bottle 3     blood glucose monitoring (NO BRAND SPECIFIED) meter device kit Use to test blood sugar 2 times daily or as directed. Brand  per insurance. 1 kit 0     blood glucose monitoring (ONE TOUCH DELICA) lancets Use to test blood sugar 2 times daily or as directed.  Ok to substitute alternative if insurance prefers. 100 each 11     lisinopril (PRINIVIL/ZESTRIL) 20 MG tablet Take 1 tablet (20 mg) by mouth daily 90 tablet 1     metFORMIN (GLUCOPHAGE-XR) 500 MG 24 hr tablet TAKE 4 TABLETS(2000 MG) BY MOUTH DAILY WITH DINNER 360 tablet 0     order for DME Equipment being ordered: BP monitor 1 Device 1     thin (NO BRAND SPECIFIED) lancets Use with lanceting device. To accompany: Blood Glucose Monitor Brands: per insurance. Pt tests two times daily. 200 each 3     UNKNOWN TO PATIENT Little blue one for diabetes       albuterol (PROAIR HFA/PROVENTIL HFA/VENTOLIN HFA) 108 (90 Base) MCG/ACT inhaler Inhale 2 puffs into the lungs every 6 hours (Patient not taking: Reported on 5/5/2020) 1 Inhaler 0     alcohol swab prep pads Use to swab  area of injection/lyle as directed. Pt tests two times daily. (Patient not taking: Reported on 5/5/2020) 200 each 3     cyclobenzaprine (FLEXERIL) 5 MG tablet May take 1 or 2 tablets 3 times a day as needed for muscle spasm and pain. Sedating. Preferably take at bedtime (Patient not taking: Reported on 5/5/2020) 60 tablet 0     fluticasone (FLONASE) 50 MCG/ACT nasal spray Spray 1-2 sprays into both nostrils daily (Patient not taking: Reported on 5/5/2020) 1 g 11     No Known Allergies    Reviewed and updated as needed this visit by Provider  Tobacco  Allergies  Meds  Problems  Med Hx  Surg Hx  Fam Hx         Review of Systems   ROS COMP: Constitutional, HEENT, cardiovascular, pulmonary, GI, , musculoskeletal, neuro, skin, endocrine and psych systems are negative, except as otherwise noted.      Objective    BP (!) 132/90   Pulse 84   Temp 97.9  F (36.6  C)   Resp 14   SpO2 99%   There is no height or weight on file to calculate BMI.  Physical Exam   GENERAL: healthy, alert and no distress  EYES: Eyes grossly normal to inspection, PERRL and conjunctivae and sclerae normal  HENT: ear canals and TM's normal, nose and mouth without ulcers or lesions  NECK: no adenopathy, no asymmetry, masses, or scars and thyroid normal to palpation  RESP: lungs clear to auscultation - no rales, rhonchi or wheezes  CV: regular rate and rhythm, normal S1 S2, no S3 or S4, no murmur, click or rub, no peripheral edema and peripheral pulses strong  ABDOMEN: soft, nontender, no hepatosplenomegaly, no masses and bowel sounds normal  MS: no gross musculoskeletal defects noted, no edema  SKIN: no suspicious lesions or rashes  NEURO: Normal strength and tone, mentation intact and speech normal  PSYCH: mentation appears normal, affect normal/bright    Diagnostic Test Results:  Labs reviewed in Epic        Assessment & Plan       ICD-10-CM    1. Acute nonintractable headache, unspecified headache type  R51    2. Seasonal allergic  rhinitis, unspecified trigger  J30.2    3. Hypertension goal BP (blood pressure) < 140/90  I10      Normal exam today.  BP mildly elevated at 132/90.  No changes made to BP medications today.  I did ask that he consider taking his blood pressure medications in the morning, rather than at night.  His other vital signs are normal.   Patient really just wanted to get testing done for COVID-19, he has already been referred to have this done, just needs to call and make appointment.  He was given the phone # to do so.   I asked that he follow-up with his PCP in the next few weeks for diabetes and hypertension follow-up with labs if possible given pandemic.  He can try other OTC medications for allergies- recommendations given.  Advised that he get eye exam when possible as the glasses he purchased may be contributing to headaches.  He was instructed to return if worsening or failure to improve.       See Patient Instructions  Patient Instructions   Please make appointment for COVID testing as recommended by [facility] visit.      Headache symptoms may be related to possible COVID infection or other cause such as your new glasses and/or allergies.     Blood pressure is slightly elevated today (132/90).  Consider taking your blood pressure medication in the morning instead of at night.  Other vital signs are stable- no fever, heart rate and oxygen normal.    For allergies, consider over the counter product such as Claritin (without the decongestant) or Zyrtec.  Could also try Flonase nasal spray.        Please schedule a follow-up visit with your primary care provider for a diabetes and hypertension follow-up visit with labs.     Please return if worsening or failure to improve.            Return in about 2 weeks (around 5/19/2020) for Diabetic check, BP Recheck, Lab Work.    Preeti Amezquita, [facility]

## 2020-05-05 NOTE — PROGRESS NOTES
"Date: 2020 07:45:04  Clinician: Kenneth Nielsen  Clinician NPI: [id number]  Patient: Gregg Lua  Patient : 1965  Patient Address: [address] [place] [address]  Patient Phone: [phone]  Visit Protocol: URI  Patient Summary:  Gregg is a 54 year old ( : 1965 ) male who initiated a Visit for COVID-19 (Coronavirus) evaluation and screening. When asked the question \"Please sign me up to receive news, health information and promotions from [employer].\", Gregg responded \"No\".    His symptoms consist of a headache.   Symptom details   Headache: He states the headache is moderate (4-6 on a 10 point pain scale).    Gregg denies having fever, myalgias, rhinitis, facial pain or pressure, sore throat, cough, nasal congestion, vomiting, nausea, teeth pain, ageusia, anosmia, diarrhea, ear pain, malaise, wheezing, enlarged lymph nodes, and chills. He also denies taking antibiotic medication for the symptoms and having recent facial or sinus surgery in the past 60 days. He is not experiencing dyspnea.    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   Gregg has not had a close contact with a laboratory-confirmed COVID-19 patient within 14 days of symptom onset. He also has not had a close contact with a suspected COVID-19 patient within 14 days of symptom onset.   Pertinent medical history  Gregg does not need a return to work/school note.   Weight: 254 lbs   Gregg does not smoke or use smokeless tobacco.   Additional information as reported by the patient (free text): Have not been able to get blood pressure checked. Issues with allergies.   Weight: 254 lbs    MEDICATIONS: lisinopril oral, " amlodipine-atorvastatin oral, atorvastatin oral, metformin oral, ALLERGIES: NKDA  Clinician Response:  Dear Gregg,   Your symptoms show that you may have coronavirus (COVID-19). This illness can cause fever, cough and trouble breathing. Many people get a mild case and get better on their own. Some people can get very sick.   Will I be tested for COVID-19?  We would recommend you be tested for coronavirus. Here is how to get that scheduled:   Call [phone]. Tell them you were referred by [facility] to have a COVID-19 test. You will be scheduled at one of our [employer] testing locations (drive-up). Please have your [facility] visit information ready when you call including your visit ID number to verify you were referred.    How can I protect others in the meantime?  First, stay home and away from others (self-isolate) until:   You've had no fever---and no medicine that reduces fever---for 3 full days (72 hours). And...    Your other symptoms have gotten better. For example, your cough or breathing has improved. And...    At least 7 days have passed since your symptoms started.   During this time:   Don't go to work, school or anywhere else.     Stay away from others in your home. No hugging, kissing or shaking hands.    Don't let anyone visit.    Cover your mouth and nose with a mask, tissue or wash cloth to avoid spreading germs.    Wash your hands and face often. Use soap and water.   How can I take care of myself?  1.Take Tylenol (acetaminophen) for fever or pain. If you have liver or kidney problems, ask your family doctor if it's okay to take Tylenol.   Adults can take either:    650 mg (two 325 mg pills) every 4 to 6 hours, or...    1,000 mg (two 500 mg pills) every 8 hours as needed.     Note: Don't take more than 3,000 mg in one day.   For children, check the Tylenol bottle for the right dose. The dose is based on the child's age or weight.  2.If you have other health problems (like cancer, heart failure, an organ  transplant or severe kidney disease): Call your specialty clinic if you don't feel better in the next 2 days.  3.Know when to call 911: If your breathing is so bad that it keeps you from doing normal activities, call 911 or go to the emergency room. Tell them that you've been staying home and may have COVID-19.  4.Sign up for [employer]. We know it's scary to hear that you might have COVID-19. We want to track your symptoms to make sure you're okay over the next 2 weeks. Please look for an email from [employer]---this is a free, online program that we'll use to keep in touch. To sign up, follow the link in the email. Learn more at [email or web address].  Where can I get more information?  To learn more about COVID-19 and how to care for yourself at home, please visit the CDC website at https://www.cdc.gov/coronavirus/2019-ncov/about/steps-when-sick.html.  For more about your care at [facility], please visit [email or web address]   Gregg,  Most of the PCP's are seeing their patients now. Because your headache might be a symptom of elevated blood pressure, please call your clinic today to schedule an appointment today or tomorrow. Headache could be a symptom of Covid as well. 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 Please call this number ([phone]) and you will be scheduled for testing. Also, please read the following:       Diagnosis: Cough  Diagnosis ICD: R05

## 2020-05-07 ENCOUNTER — OFFICE VISIT (OUTPATIENT)
Dept: URGENT CARE | Facility: URGENT CARE | Age: 55
End: 2020-05-07
Payer: COMMERCIAL

## 2020-05-07 DIAGNOSIS — Z20.822 SUSPECTED COVID-19 VIRUS INFECTION: Primary | ICD-10-CM

## 2020-05-07 PROCEDURE — 87635 SARS-COV-2 COVID-19 AMP PRB: CPT | Mod: 90 | Performed by: FAMILY MEDICINE

## 2020-05-07 PROCEDURE — 99207 ZZC NO BILLABLE SERVICE THIS VISIT: CPT

## 2020-05-07 PROCEDURE — 99000 SPECIMEN HANDLING OFFICE-LAB: CPT | Performed by: FAMILY MEDICINE

## 2020-05-08 LAB
SARS-COV-2 RNA SPEC QL NAA+PROBE: NOT DETECTED
SPECIMEN SOURCE: NORMAL

## 2020-06-04 DIAGNOSIS — E11.9 TYPE 2 DIABETES MELLITUS WITHOUT COMPLICATION, WITHOUT LONG-TERM CURRENT USE OF INSULIN (H): ICD-10-CM

## 2020-06-04 NOTE — TELEPHONE ENCOUNTER
Routing refill request to provider for review/approval because:  Labs not current:  Lipids    Winifred Beltran BSN, RN

## 2020-06-04 NOTE — LETTER
June 5, 2020    Gregg Lua  [address] [address] [address]    Dear Gregg,       We recently received a refill request for atorvastatin (LIPITOR) 10 MG tablet .  We have refilled this for a one time 90 day supply only because you are due for a:    Diabetes VIRTUAL visit and fasting lab appointment and blood pressure check with a nurse      Please schedule this lab appointment 4-5 days prior to the office visit.     Please call at your earliest convenience so that there will not be a delay with your future refills.          Thank you,   Your [facility] Team/sp  [phone]

## 2020-06-05 RX ORDER — ATORVASTATIN CALCIUM 10 MG/1
TABLET, FILM COATED ORAL
Qty: 90 TABLET | Refills: 0 | Status: SHIPPED | OUTPATIENT
Start: 2020-06-05 | End: 2020-07-29

## 2020-06-24 ENCOUNTER — OFFICE VISIT (OUTPATIENT)
Dept: NURSING | Facility: CLINIC | Age: 55
End: 2020-06-24
Payer: COMMERCIAL

## 2020-06-24 VITALS — SYSTOLIC BLOOD PRESSURE: 133 MMHG | HEART RATE: 102 BPM | DIASTOLIC BLOOD PRESSURE: 77 MMHG

## 2020-06-24 DIAGNOSIS — I10 HYPERTENSION GOAL BP (BLOOD PRESSURE) < 140/90: Primary | ICD-10-CM

## 2020-06-24 PROCEDURE — 99207 ZZC NO CHARGE NURSE ONLY: CPT

## 2020-06-24 NOTE — PROGRESS NOTES
Gregg Lua is a 55 year old patient who comes in today for a Blood Pressure check.  Initial BP:  /77   Pulse 102      Disposition: results routed to provider  BP Readings from Last 3 Encounters:   06/24/20 133/77   05/05/20 (!) 132/90   12/27/19 129/79   Sylwia Jackson CMA

## 2020-06-24 NOTE — PROGRESS NOTES
Patient consents to receive outdoor care: Yes    Upon arrival, patient instructed to proceed to designated location, place vehicle in park, turn off, and remove keys     If we are unable to safely and ergonomically able to provide care- is the patient able to safely able to get out of car and transfer to a chair? Yes    Patient would [facility] to [facility] their [email or web address]

## 2020-07-17 DIAGNOSIS — I10 HYPERTENSION GOAL BP (BLOOD PRESSURE) < 140/90: ICD-10-CM

## 2020-07-17 RX ORDER — LISINOPRIL 20 MG/1
TABLET ORAL
Qty: 90 TABLET | Refills: 1 | OUTPATIENT
Start: 2020-07-17

## 2020-07-17 RX ORDER — LISINOPRIL 20 MG/1
20 TABLET ORAL DAILY
Qty: 90 TABLET | Refills: 1 | Status: SHIPPED | OUTPATIENT
Start: 2020-07-17 | End: 2021-01-19

## 2020-07-17 NOTE — TELEPHONE ENCOUNTER
Patient only has 1 lisinopril left. He is asking if this refill can be sent in please.Nora Candelario MA/CALLY

## 2020-07-17 NOTE — TELEPHONE ENCOUNTER
"Requested Prescriptions   Signed Prescriptions Disp Refills    lisinopril (ZESTRIL) 20 MG tablet 90 tablet 1     Sig: Take 1 tablet (20 mg) by mouth daily       ACE Inhibitors (Including Combos) Protocol Passed - 7/17/2020  1:30 PM        Passed - Blood pressure under 140/90 in past 12 months     BP Readings from Last 3 Encounters:   06/24/20 133/77   05/05/20 (!) 132/90   12/27/19 129/79                 Passed - Recent (12 mo) or future (30 days) visit within the authorizing provider's specialty     Patient has had an office visit with the authorizing provider or a provider within the authorizing providers department within the previous 12 mos or has a future within next 30 days. See \"Patient Info\" tab in inbasket, or \"Choose Columns\" in Meds & Orders section of the refill encounter.              Passed - Medication is active on med list        Passed - Patient is age 18 or older        Passed - Normal serum creatinine on file in past 12 months     Recent Labs   Lab Test 10/10/19  1244   CR 0.98       Ok to refill medication if creatinine is low          Passed - Normal serum potassium on file in past 12 months     Recent Labs   Lab Test 10/10/19  1244   POTASSIUM 4.5                  "

## 2020-07-29 DIAGNOSIS — E11.9 TYPE 2 DIABETES MELLITUS WITHOUT COMPLICATION, WITHOUT LONG-TERM CURRENT USE OF INSULIN (H): ICD-10-CM

## 2020-07-29 RX ORDER — METFORMIN HCL 500 MG
TABLET, EXTENDED RELEASE 24 HR ORAL
Qty: 360 TABLET | Refills: 0 | Status: SHIPPED | OUTPATIENT
Start: 2020-07-29 | End: 2020-09-28

## 2020-07-29 RX ORDER — ATORVASTATIN CALCIUM 10 MG/1
TABLET, FILM COATED ORAL
Qty: 90 TABLET | Refills: 0 | Status: SHIPPED | OUTPATIENT
Start: 2020-07-29 | End: 2020-09-03

## 2020-07-29 NOTE — LETTER
July 29, 2020    Gregg Lua  [address] [facility] [address]    Dear Gregg,       We recently received a refill request for atorvastatin and metformin.  We have refilled this for a one time 90 day supply only because you are due for a:    Diabetes office visit and fasting lab appointment      Please schedule this lab appointment 4-5 days prior to the office visit.     Please call at your earliest convenience so that there will not be a delay with your future refills.          Thank you,   Your [facility] Team/  [phone]

## 2020-07-29 NOTE — TELEPHONE ENCOUNTER
Routing refill request to provider for review/approval because:  Labs not current:  Lipids, A1C    Winifred MCRAEN, RN

## 2020-08-20 ENCOUNTER — TELEPHONE (OUTPATIENT)
Dept: FAMILY MEDICINE | Facility: CLINIC | Age: 55
End: 2020-08-20

## 2020-08-20 DIAGNOSIS — E11.9 TYPE 2 DIABETES MELLITUS WITHOUT COMPLICATION, WITHOUT LONG-TERM CURRENT USE OF INSULIN (H): Primary | ICD-10-CM

## 2020-08-20 NOTE — TELEPHONE ENCOUNTER
Reason for call:  Order   Order or referral being requested: diabetes ed  Reason for request: follow up with Melanie  Date needed: before my next appointment  Has the patient been seen by the PCP for this problem? YES    Additional comments: Patient stated that he was advised by his pcp to follow up with LEANNE Navarro, to go over his numbers. Patient's referral has . Please put in new referral. He must have new referral in place before his appointment with Melanie on .    Phone number to reach patient:  Cell number on file:    Telephone Information:   Mobile [phone]       Best Time:  any    Can we leave a detailed message on this number?  Not Applicable    Travel screening: Not Applicable     Erendira ANDREWS  Central Scheduler

## 2020-08-24 ENCOUNTER — ALLIED HEALTH/NURSE VISIT (OUTPATIENT)
Dept: FAMILY MEDICINE | Facility: CLINIC | Age: 55
End: 2020-08-24
Payer: COMMERCIAL

## 2020-08-24 VITALS — SYSTOLIC BLOOD PRESSURE: 138 MMHG | DIASTOLIC BLOOD PRESSURE: 75 MMHG | HEART RATE: 99 BPM

## 2020-08-24 DIAGNOSIS — Z01.30 BLOOD PRESSURE CHECK: Primary | ICD-10-CM

## 2020-08-24 PROCEDURE — 99207 ZZC NO CHARGE LOS: CPT | Performed by: PHYSICIAN ASSISTANT

## 2020-08-24 NOTE — PROGRESS NOTES
Gregg Lua was evaluated at [facility] [place] on August 24, 2020 at which time his blood pressure was:    BP Readings from Last 3 Encounters:   08/24/20 138/75   06/24/20 133/77   05/05/20 (!) 132/90     Pulse Readings from Last 3 Encounters:   08/24/20 99   06/24/20 102   05/05/20 84       Reviewed lifestyle modifications for blood pressure control and reduction: including making healthy food choices, managing weight, getting regular exercise, smoking cessation, reducing alcohol consumption, monitoring blood pressure regularly.     Symptoms: None    BP Goal:< 140/90 mmHg    BP Assessment:  BP at goal    Potential Reasons for BP too high: NA - Not applicable    BP Follow-Up Plan: Recheck BP in 6 months at pharmacy    Recommendation to Provider: None    Note completed by: Taqueria Addison RPh.  [facility]  [phone]

## 2020-08-25 ENCOUNTER — TELEPHONE (OUTPATIENT)
Dept: EDUCATION SERVICES | Facility: CLINIC | Age: 55
End: 2020-08-25

## 2020-08-25 NOTE — TELEPHONE ENCOUNTER
Called Gregg to reschedule appointment from 9/2 at 3:00 to 9/9 @ 1:30    Melanie Garcia RN/KAREEME  [place] [facility] Educator

## 2020-09-03 ENCOUNTER — OFFICE VISIT (OUTPATIENT)
Dept: FAMILY MEDICINE | Facility: CLINIC | Age: 55
End: 2020-09-03
Payer: COMMERCIAL

## 2020-09-03 VITALS
HEART RATE: 78 BPM | DIASTOLIC BLOOD PRESSURE: 86 MMHG | TEMPERATURE: 97 F | OXYGEN SATURATION: 100 % | WEIGHT: 248 LBS | SYSTOLIC BLOOD PRESSURE: 136 MMHG | HEIGHT: 68 IN | BODY MASS INDEX: 37.59 KG/M2

## 2020-09-03 DIAGNOSIS — M25.512 LEFT SHOULDER PAIN, UNSPECIFIED CHRONICITY: ICD-10-CM

## 2020-09-03 DIAGNOSIS — Z82.49 FH: MI (MYOCARDIAL INFARCTION): ICD-10-CM

## 2020-09-03 DIAGNOSIS — J30.2 SEASONAL ALLERGIC RHINITIS, UNSPECIFIED TRIGGER: ICD-10-CM

## 2020-09-03 DIAGNOSIS — I10 HYPERTENSION GOAL BP (BLOOD PRESSURE) < 140/90: ICD-10-CM

## 2020-09-03 DIAGNOSIS — E11.9 TYPE 2 DIABETES MELLITUS WITHOUT COMPLICATION, WITHOUT LONG-TERM CURRENT USE OF INSULIN (H): Primary | ICD-10-CM

## 2020-09-03 LAB
ANION GAP SERPL CALCULATED.3IONS-SCNC: 6 MMOL/L (ref 3–14)
BUN SERPL-MCNC: 16 MG/DL (ref 7–30)
CALCIUM SERPL-MCNC: 8.9 MG/DL (ref 8.5–10.1)
CHLORIDE SERPL-SCNC: 108 MMOL/L (ref 94–109)
CHOLEST SERPL-MCNC: 103 MG/DL
CO2 SERPL-SCNC: 27 MMOL/L (ref 20–32)
CREAT SERPL-MCNC: 1.19 MG/DL (ref 0.66–1.25)
CREAT UR-MCNC: 244 MG/DL
GFR SERPL CREATININE-BSD FRML MDRD: 68 ML/MIN/{1.73_M2}
GLUCOSE SERPL-MCNC: 107 MG/DL (ref 70–99)
HBA1C MFR BLD: 6.3 % (ref 0–5.6)
HDLC SERPL-MCNC: 43 MG/DL
LDLC SERPL CALC-MCNC: 28 MG/DL
MICROALBUMIN UR-MCNC: 56 MG/L
MICROALBUMIN/CREAT UR: 22.83 MG/G CR (ref 0–17)
NONHDLC SERPL-MCNC: 60 MG/DL
POTASSIUM SERPL-SCNC: 3.9 MMOL/L (ref 3.4–5.3)
SODIUM SERPL-SCNC: 141 MMOL/L (ref 133–144)
TRIGL SERPL-MCNC: 158 MG/DL

## 2020-09-03 PROCEDURE — 36415 COLL VENOUS BLD VENIPUNCTURE: CPT | Performed by: PHYSICIAN ASSISTANT

## 2020-09-03 PROCEDURE — 93000 ELECTROCARDIOGRAM COMPLETE: CPT | Performed by: PHYSICIAN ASSISTANT

## 2020-09-03 PROCEDURE — 80061 LIPID PANEL: CPT | Performed by: PHYSICIAN ASSISTANT

## 2020-09-03 PROCEDURE — 80048 BASIC METABOLIC PNL TOTAL CA: CPT | Performed by: PHYSICIAN ASSISTANT

## 2020-09-03 PROCEDURE — 99214 OFFICE O/P EST MOD 30 MIN: CPT | Performed by: PHYSICIAN ASSISTANT

## 2020-09-03 PROCEDURE — 83036 HEMOGLOBIN GLYCOSYLATED A1C: CPT | Performed by: PHYSICIAN ASSISTANT

## 2020-09-03 PROCEDURE — 82043 UR ALBUMIN QUANTITATIVE: CPT | Performed by: PHYSICIAN ASSISTANT

## 2020-09-03 RX ORDER — FLUTICASONE PROPIONATE 50 MCG
1 SPRAY, SUSPENSION (ML) NASAL DAILY
Qty: 1 ML | Refills: 3 | Status: SHIPPED | OUTPATIENT
Start: 2020-09-03 | End: 2020-12-22

## 2020-09-03 RX ORDER — METHOCARBAMOL 750 MG/1
750 TABLET, FILM COATED ORAL 3 TIMES DAILY
Qty: 30 TABLET | Refills: 0 | Status: SHIPPED | OUTPATIENT
Start: 2020-09-03

## 2020-09-03 RX ORDER — ATORVASTATIN CALCIUM 10 MG/1
10 TABLET, FILM COATED ORAL DAILY
Qty: 90 TABLET | Refills: 1 | Status: SHIPPED | OUTPATIENT
Start: 2020-09-03 | End: 2021-06-29

## 2020-09-03 ASSESSMENT — MIFFLIN-ST. JEOR: SCORE: 1929.42

## 2020-09-03 NOTE — PROGRESS NOTES
"Subjective     Gregg Lua is a 55 year old male who presents to clinic today for the following health issues:    History of Present Illness        Back Pain:  He presents for follow up of back pain. Patient's back pain is a recurring problem.  Location of back pain:  Left upper back and left shoulder  Description of back pain: shooting  Back pain spreads: left shoulder    Since patient first noticed back pain, pain is: unchanged  Does back pain interfere with his job:  No      Diabetes:   He presents for follow up of diabetes.  He is checking home blood glucose two times daily. He checks blood glucose before meals and after meals.  Blood glucose is sometimes over 200 and never under 70. He is aware of hypoglycemia symptoms including shakiness. He is concerned about other.  He is having blurry vision. The patient has not had a diabetic eye exam in the last 12 months.         Hypertension: He presents for follow up of hypertension.  He does not check blood pressure  regularly outside of the clinic. Outpatient blood pressures have not been over 140/90. He follows a low salt diet.     Migraines:   Since the patient's last clinic visit, headaches are: worsened  The patient is getting headaches:  Daily  He is able to do normal daily activities when he has a migraine.  The patient is taking the following rescue/relief medications:  Tylenol   Patient states \"I get only a small amount of relief\" from the rescue/relief medications.   The patient is taking the following medications to prevent migraines:  No medications to prevent migraines  In the past 4 weeks, the patient has gone to an Urgent Care or Emergency Room 0 times times due to headaches.    He eats 0-1 servings of fruits and vegetables daily.He exercises with enough effort to increase his heart rate 30 to 60 minutes per day.  He exercises with enough effort to increase his heart rate 5 days per week.   He is taking medications regularly.     Pain in his upper " "left trap region has been going on for over a month.  He states sometimes it hurts with movement sometimes it does not.  He states sometimes he gets pain into his shoulder region at times.  He does not feel it triggers any migraines.  He denies any exertional symptoms.  Of note he did have a father passed away in his 30s from a heart attack.  He states he has had an EKG and a stress test many years ago.  Care everywhere was reviewed.      He states over the last year or 2 he has noticed an increase of development of seasonal allergies.  He takes antihistamines and Flonase occasionally for this.      Review of Systems   Constitutional, HEENT, cardiovascular, pulmonary, gi and gu systems are negative, except as otherwise noted.      Objective    /86   Pulse 78   Temp 97  F (36.1  C) (Oral)   Ht 1.727 m (5' 8\")   Wt 112.5 kg (248 lb)   SpO2 100%   BMI 37.71 kg/m    Body mass index is 37.71 kg/m .  Physical Exam   GENERAL: healthy, alert and no distress  EYES: Eyes grossly normal to inspection, PERRL and conjunctivae and sclerae normal  HENT: ear canals and TM's normal, nose and mouth without ulcers or lesions  NECK: no adenopathy, no asymmetry, masses, or scars and thyroid normal to palpation  RESP: lungs clear to auscultation - no rales, rhonchi or wheezes  CV: regular rate and rhythm, normal S1 S2, no S3 or S4, no murmur, click or rub, no peripheral edema and peripheral pulses strong  ABDOMEN: soft, nontender, no hepatosplenomegaly, no masses and bowel sounds normal  MS: no gross musculoskeletal defects noted, no edema  SKIN: no suspicious lesions or rashes  NEURO: Normal strength and tone, sensory exam grossly normal, mentation intact and cranial nerves 2-12 intact  PSYCH: mentation appears normal, affect normal/bright  Full range of motion bilateral upper extremities with 5 out of 5 strength.  He has full range of motion of his neck.  Mild reproducible tenderness in the left upper trap.  No other bony " "tenderness.  EKG - appears normal, NSR, normal axis, normal intervals, no acute ST/T changes c/w ischemia, no LVH by voltage criteria, unchanged from previous tracings        Assessment & Plan       ICD-10-CM    1. Type 2 diabetes mellitus without complication, without long-term current use of insulin (H)  E11.9 atorvastatin (LIPITOR) 10 MG tablet     Albumin Random Urine Quantitative with Creat Ratio     **Basic metabolic panel FUTURE anytime     **A1C FUTURE anytime     Lipid panel reflex to direct LDL Fasting   2. Hypertension goal BP (blood pressure) < 140/90  I10    3. Seasonal allergic rhinitis, unspecified trigger  J30.2 fluticasone (FLONASE) 50 MCG/ACT nasal spray   4. Left shoulder pain, unspecified chronicity  M25.512 EKG 12-lead complete w/read - Clinics     Echocardiogram Exercise Stress     methocarbamol (ROBAXIN) 750 MG tablet     [facility], AND CHIROPRACTIC REFERRAL   5. FH: MI (myocardial infarction)  Z82.49 EKG 12-lead complete w/read - Clinics     Echocardiogram Exercise Stress     CARDIOLOGY EVAL ADULT REFERRAL   Talk to patient about his concerns.  Medical conditions are stable.  Medications were refilled.  Labs are pending.  We will go ahead and get him set up for a stress test and follow-up with cardiology.  Of concern for possible atypical presentation of cardiac etiology based on his symptoms and and history of diabetes.  We will also consider treating this is more of a musculoskeletal etiology with ice heat ibuprofen.  A muscle relaxant was given.  He can consider physical therapy.  Follow-up depends on results.    BMI:   Estimated body mass index is 37.71 kg/m  as calculated from the following:    Height as of this encounter: 1.727 m (5' 8\").    Weight as of this encounter: 112.5 kg (248 lb).   Weight management plan: Discussed healthy diet and exercise guidelines      Return in about 6 months (around 3/3/2021) for recheck.    Mohit Randall PA-C  [facility]    "

## 2020-09-03 NOTE — RESULT ENCOUNTER NOTE
Mr. Lua,    All of your labs were normal/near normal for you.    Please contact the clinic if you have additional questions.  Thank you.    Sincerely,    Mohit Randall PA-C

## 2020-09-03 NOTE — NURSING NOTE
"Chief Complaint   Patient presents with     Lipids     Diabetes     Hypertension       Initial /86   Pulse 78   Temp 97  F (36.1  C) (Oral)   Ht 1.727 m (5' 8\")   Wt 112.5 kg (248 lb)   SpO2 100%   BMI 37.71 kg/m   Estimated body mass index is 37.71 kg/m  as calculated from the following:    Height as of this encounter: 1.727 m (5' 8\").    Weight as of this encounter: 112.5 kg (248 lb).  Medication Reconciliation: complete  Willy Houser CMA    "

## 2020-09-10 ENCOUNTER — THERAPY VISIT (OUTPATIENT)
Dept: PHYSICAL THERAPY | Facility: CLINIC | Age: 55
End: 2020-09-10
Attending: PHYSICIAN ASSISTANT
Payer: COMMERCIAL

## 2020-09-10 DIAGNOSIS — M25.512 LEFT SHOULDER PAIN, UNSPECIFIED CHRONICITY: ICD-10-CM

## 2020-09-10 DIAGNOSIS — M54.2 NECK PAIN: ICD-10-CM

## 2020-09-10 PROCEDURE — 97140 MANUAL THERAPY 1/> REGIONS: CPT | Mod: GP | Performed by: PHYSICAL THERAPIST

## 2020-09-10 PROCEDURE — 97161 PT EVAL LOW COMPLEX 20 MIN: CPT | Mod: GP | Performed by: PHYSICAL THERAPIST

## 2020-09-10 PROCEDURE — 97110 THERAPEUTIC EXERCISES: CPT | Mod: GP | Performed by: PHYSICAL THERAPIST

## 2020-09-10 NOTE — PROGRESS NOTES
[facility] Initial Evaluation  Subjective:  The history is provided by the patient.   Patient Health History  Gregg Lua being seen for upper left trapezius discomfort.     Problem began: 9/3/2020 (consult with provider).   Problem occurred: ?   Pain is reported as 6/10 on pain scale.  General health as reported by patient is fair.  Pertinent medical history includes: chemical dependency, high blood pressure and diabetes.   Red flags:  None as reported by patient.  Medical allergies: none.   Surgeries include:  Other. Other surgery history details: gallbladder.    Current medications:  High blood pressure medication and other. Other medications details: diabetes.    Current occupation is counselor.   Primary job tasks include:  Prolonged sitting.                  Therapist Generated HPI Evaluation  Problem details: Pt has been experiencing his L upper back/shoulder pain for the past 6 weeks. He doesn't recall any incident that may have caused this, but 6 months ago he had a similar episode. Before starting the muscle relaxers, his PLs were usually at the 7-8/10 level, now they are around a 2-3/10 level..         Type of problem:  Cervical spine (L side, into shoulder).    This is a new condition.  Condition occurred with:  Insidious onset.  Where condition occurred: for unknown reasons.  Patient reports pain:  Upper thoracic and thoracic left side.  Pain is described as aching and is constant.  Pain radiates to:  Shoulder left. Pain is the same all the time.  Since onset symptoms are unchanged.  Associated symptoms:  Loss of motion/stiffness. Symptoms are exacerbated by rotating head, sitting, driving and looking up or down  and relieved by muscle relaxants.  Imaging testing: none.  Past treatment: trigger point injection in 2019. There was significant improvement following previous treatment.  Restrictions due to condition include:  Working in normal job without restrictions.  Barriers  include:  None as reported by patient.                        Objective:  Standing Alignment:    Cervical/Thoracic:  Forward head (significant)  Shoulder/UE:  Rounded shoulders (moderate)                                  Cervical/Thoracic Evaluation    AROM:  AROM Cervical:    Flexion:            90% of NL w/pulling  Extension:       33% w/pulling  Rotation:         Left: 75% w/pulling on L     Right: 75%  Side Bend:      Left: 80% w/pulling on L     Right:  60% w/less pulling sensation on L      Headaches: none  Cervical Myotomes:  not assessed                  DTR's:  not assessed          Cervical Dermatomes:  not assessed                    Cervical Palpation:    Tenderness present at Left:    Upper Trap; Levator; Erector Spinae and Suboccipitals  Tenderness not present at Left:   Scalenes or Rhomboids  Tenderness present at Right:    Upper Trap; Erector Spinae and Suboccipitals  Tenderness not present at Right:      Scalenes; Rhomboids or Levator      Spinal Segmental Conclusions:  Restrictions and pain w/PA glides at C4 to C7, restricted w/some pain doing side glides R to L at same levels.                                                  General     ROS    Assessment/Plan:    Patient is a 55 year old male with cervical and thoracic complaints.    Patient has the following significant findings with corresponding treatment plan.                Diagnosis 1:  L sided upper back pain, trapezius strain  Pain -  hot/cold therapy, US, electric stimulation, self management, education and home program  Decreased ROM/flexibility - manual therapy and therapeutic exercise  Decreased joint mobility - manual therapy and therapeutic exercise  Decreased strength - therapeutic exercise and therapeutic activities  Impaired muscle performance - neuro re-education  Decreased function - therapeutic activities  Impaired posture - neuro re-education and therapeutic activities    Therapy Evaluation Codes:   1) History comprised  of:   Personal factors that impact the plan of care:      Past/current experiences and Time since onset of symptoms.    Comorbidity factors that impact the plan of care are:      Chemical Dependency, Diabetes, High blood pressure and Overweight.     Medications impacting care: High blood pressure and diabetes.  2) Examination of Body Systems comprised of:   Body structures and functions that impact the plan of care:      Cervical spine and Thoracic Spine.   Activity limitations that impact the plan of care are:      Driving, Dressing, Lifting and Sitting.  3) Clinical presentation characteristics are:   Stable/Uncomplicated.  4) Decision-Making    Low complexity using standardized patient assessment instrument and/or measureable assessment of functional outcome.  Cumulative Therapy Evaluation is: Low complexity.    Previous and current functional limitations:  (See Goal Flow Sheet for this information)    Short term and Long term goals: (See Goal Flow Sheet for this information)     Communication ability:  Patient appears to be able to clearly communicate and understand verbal and written communication and follow directions correctly.  Treatment Explanation - The following has been discussed with the patient:   RX ordered/plan of care  Anticipated outcomes  Possible risks and side effects  This patient would benefit from PT intervention to resume normal activities.   Rehab potential is good.    Frequency:  1 X week, once daily  Duration:  for 8 weeks  Discharge Plan:  Achieve all LTG.  Independent in home treatment program.  Reach maximal therapeutic benefit.    Please refer to the daily flowsheet for treatment today, total treatment time and time spent performing 1:1 timed codes.

## 2020-09-27 DIAGNOSIS — E11.9 TYPE 2 DIABETES MELLITUS WITHOUT COMPLICATION, WITHOUT LONG-TERM CURRENT USE OF INSULIN (H): ICD-10-CM

## 2020-09-28 RX ORDER — METFORMIN HCL 500 MG
TABLET, EXTENDED RELEASE 24 HR ORAL
Qty: 360 TABLET | Refills: 1 | Status: SHIPPED | OUTPATIENT
Start: 2020-09-28 | End: 2021-05-20

## 2020-12-07 PROBLEM — M54.2 NECK PAIN: Status: RESOLVED | Noted: 2020-09-10 | Resolved: 2020-12-07

## 2020-12-07 NOTE — PROGRESS NOTES
Patient seen for one time evaluation and treatment.  Patient did not return for further treatment (cancelled 4 appointments and No Showed 2 times) and current status is unknown.  Please see initial evaluation for further information.

## 2020-12-22 ENCOUNTER — OFFICE VISIT (OUTPATIENT)
Dept: FAMILY MEDICINE | Facility: CLINIC | Age: 55
End: 2020-12-22
Payer: COMMERCIAL

## 2020-12-22 VITALS
TEMPERATURE: 97.9 F | DIASTOLIC BLOOD PRESSURE: 82 MMHG | OXYGEN SATURATION: 100 % | WEIGHT: 246 LBS | HEART RATE: 80 BPM | BODY MASS INDEX: 37.4 KG/M2 | SYSTOLIC BLOOD PRESSURE: 148 MMHG

## 2020-12-22 DIAGNOSIS — J30.2 SEASONAL ALLERGIC RHINITIS, UNSPECIFIED TRIGGER: ICD-10-CM

## 2020-12-22 DIAGNOSIS — R42 VERTIGO: Primary | ICD-10-CM

## 2020-12-22 LAB
ALBUMIN SERPL-MCNC: 3.8 G/DL (ref 3.4–5)
ALP SERPL-CCNC: 63 U/L (ref 40–150)
ALT SERPL W P-5'-P-CCNC: 18 U/L (ref 0–70)
ANION GAP SERPL CALCULATED.3IONS-SCNC: 4 MMOL/L (ref 3–14)
AST SERPL W P-5'-P-CCNC: 12 U/L (ref 0–45)
BASOPHILS # BLD AUTO: 0 10E9/L (ref 0–0.2)
BASOPHILS NFR BLD AUTO: 0.2 %
BILIRUB SERPL-MCNC: 0.3 MG/DL (ref 0.2–1.3)
BUN SERPL-MCNC: 15 MG/DL (ref 7–30)
CALCIUM SERPL-MCNC: 9 MG/DL (ref 8.5–10.1)
CHLORIDE SERPL-SCNC: 105 MMOL/L (ref 94–109)
CO2 SERPL-SCNC: 30 MMOL/L (ref 20–32)
CREAT SERPL-MCNC: 1.08 MG/DL (ref 0.66–1.25)
DIFFERENTIAL METHOD BLD: NORMAL
EOSINOPHIL # BLD AUTO: 0.1 10E9/L (ref 0–0.7)
EOSINOPHIL NFR BLD AUTO: 1.9 %
ERYTHROCYTE [DISTWIDTH] IN BLOOD BY AUTOMATED COUNT: 12.5 % (ref 10–15)
GFR SERPL CREATININE-BSD FRML MDRD: 76 ML/MIN/{1.73_M2}
GLUCOSE SERPL-MCNC: 126 MG/DL (ref 70–99)
HCT VFR BLD AUTO: 40.6 % (ref 40–53)
HGB BLD-MCNC: 13.5 G/DL (ref 13.3–17.7)
LYMPHOCYTES # BLD AUTO: 2.3 10E9/L (ref 0.8–5.3)
LYMPHOCYTES NFR BLD AUTO: 39.1 %
MCH RBC QN AUTO: 30.4 PG (ref 26.5–33)
MCHC RBC AUTO-ENTMCNC: 33.3 G/DL (ref 31.5–36.5)
MCV RBC AUTO: 91 FL (ref 78–100)
MONOCYTES # BLD AUTO: 0.5 10E9/L (ref 0–1.3)
MONOCYTES NFR BLD AUTO: 8.9 %
NEUTROPHILS # BLD AUTO: 2.9 10E9/L (ref 1.6–8.3)
NEUTROPHILS NFR BLD AUTO: 49.9 %
PLATELET # BLD AUTO: 182 10E9/L (ref 150–450)
POTASSIUM SERPL-SCNC: 4 MMOL/L (ref 3.4–5.3)
PROT SERPL-MCNC: 7.9 G/DL (ref 6.8–8.8)
RBC # BLD AUTO: 4.44 10E12/L (ref 4.4–5.9)
SODIUM SERPL-SCNC: 139 MMOL/L (ref 133–144)
WBC # BLD AUTO: 5.9 10E9/L (ref 4–11)

## 2020-12-22 PROCEDURE — 99213 OFFICE O/P EST LOW 20 MIN: CPT | Performed by: PHYSICIAN ASSISTANT

## 2020-12-22 PROCEDURE — 85025 COMPLETE CBC W/AUTO DIFF WBC: CPT | Performed by: PHYSICIAN ASSISTANT

## 2020-12-22 PROCEDURE — 36415 COLL VENOUS BLD VENIPUNCTURE: CPT | Performed by: PHYSICIAN ASSISTANT

## 2020-12-22 PROCEDURE — 80053 COMPREHEN METABOLIC PANEL: CPT | Performed by: PHYSICIAN ASSISTANT

## 2020-12-22 RX ORDER — FLUTICASONE PROPIONATE 50 MCG
1-2 SPRAY, SUSPENSION (ML) NASAL DAILY
Qty: 1 G | Refills: 11 | Status: SHIPPED | OUTPATIENT
Start: 2020-12-22

## 2020-12-22 NOTE — PROGRESS NOTES
Subjective     Gregg Lua is a 55 year old male who has a history of hypertension, diabetes, seasonal allergies, sleep apnea, presents to clinic today for the following health issues:    HPI         Dizziness  Onset/Duration: diagnosed with vertigo 15 years ago. occ will have morning vertigo off and on in the past. worse over the last 4 weeks- room spinning rates it 4/5-10. Wants to make sure it isn't blood pressure related. Mild headache. No recent cold symptoms. History of allergies. Tx: flonase.   He does it admit to using over-the-counter vitamins and supplements.  He denies any chest pain or shortness of breath or visual changes.  He states his symptoms are just more annoying than any  Description:   Do you feel faint: no  Does it feel like the surroundings (bed, room) are moving: YES   Unsteady/off balance: YES- slightly off balanced when getting up from laying down  Have you passed out or fallen: no  Intensity: moderate  Progression of Symptoms: worsening  Accompanying Signs & Symptoms: slight headaches  Heart palpitations or chest pain: no  Nausea, vomiting: no  Weakness or lack of coordination in arms or legs: no  Vision or speech changes: no  Numbness or tingling: no  Ringing in ears (Tinnitus): no  Hearing Loss: no  History:   Head trauma/concussion history: YES- years ago had a head injury  Recent bleeding history: no  Any new medications (BP?): no  Precipitating factors:   Worse with activity: YES- laying down and getting up fast  Worse with head movement: no  Alleviating factors:   Does staying in a fixed position give relief: no   Therapies tried and outcome: cold medications helps sometimes  Review of Systems   Constitutional, HEENT, cardiovascular, pulmonary, gi and gu systems are negative, except as otherwise noted.      Objective    BP (!) 148/82   Pulse 80   Temp 97.9  F (36.6  C) (Tympanic)   Wt 111.6 kg (246 lb)   SpO2 100%   BMI 37.40 kg/m    Body mass index is 37.4 kg/m .  Physical  Exam   GENERAL: healthy, alert and no distress  EYES: Eyes grossly normal to inspection, PERRL and conjunctivae and sclerae normal  HENT: ear canals and TM's normal, nose and mouth without ulcers or lesions  NECK: no adenopathy, no asymmetry, masses, or scars and thyroid normal to palpation  RESP: lungs clear to auscultation - no rales, rhonchi or wheezes  CV: regular rate and rhythm, normal S1 S2, no S3 or S4, no murmur, click or rub, no peripheral edema and peripheral pulses strong  ABDOMEN: soft, nontender, no hepatosplenomegaly, no masses and bowel sounds normal  MS: no gross musculoskeletal defects noted, no edema  SKIN: no suspicious lesions or rashes  NEURO: Normal strength and tone, sensory exam grossly normal, mentation intact and cranial nerves 2-12 intact  PSYCH: mentation appears normal, affect normal/bright    Labs pending  Orthostatic blood pressures were in the normal range.        Assessment & Plan       ICD-10-CM    1. Vertigo  R42 CBC with platelets and differential     Comprehensive metabolic panel (BMP + Alb, Alk Phos, ALT, AST, Total. Bili, TP)   2. Seasonal allergic rhinitis, unspecified trigger  J30.2 fluticasone (FLONASE) 50 MCG/ACT nasal spray   Talk to patient about his concerns.  I feel that the symptoms are may be more allergy related.  We will get him back on his Flonase and have him consider doing over-the-counter antihistamine tablet also.  We will have him stop any herbal supplements or vitamins for 2 to 4 weeks to see if this helps his symptoms also.  Warning signs were discussed recheck in 4 weeks as needed.      Return in about 4 weeks (around 1/19/2021) for recheck.    Mohit Randall PA-C  [facility]

## 2021-01-15 ENCOUNTER — HEALTH MAINTENANCE LETTER (OUTPATIENT)
Age: 56
End: 2021-01-15

## 2021-01-19 ENCOUNTER — ALLIED HEALTH/NURSE VISIT (OUTPATIENT)
Dept: FAMILY MEDICINE | Facility: CLINIC | Age: 56
End: 2021-01-19
Payer: COMMERCIAL

## 2021-01-19 VITALS — DIASTOLIC BLOOD PRESSURE: 80 MMHG | HEART RATE: 72 BPM | SYSTOLIC BLOOD PRESSURE: 120 MMHG

## 2021-01-19 DIAGNOSIS — Z01.30 BP CHECK: Primary | ICD-10-CM

## 2021-01-19 PROCEDURE — 99207 PR NO CHARGE NURSE ONLY: CPT | Performed by: PHYSICIAN ASSISTANT

## 2021-01-19 NOTE — Clinical Note
Patient had blood pressure checked at pharmacy today, 138/88 he reported he had not taken his medication today, he went home and took his medications and came back about 2 hours later, blood pressure 120/80 pulse 72. Patient is looking to get his medications approved for [facility].  Thank you  Mariana Florian [facility]

## 2021-01-19 NOTE — PROGRESS NOTES
Gregg Lua was evaluated at [facility] on January 19, 2021 at which time his blood pressure was:    BP Readings from Last 3 Encounters:   01/19/21 120/80   12/22/20 (!) 148/82   09/03/20 136/86     Pulse Readings from Last 3 Encounters:   01/19/21 72   12/22/20 80   09/03/20 78       Reviewed lifestyle modifications for blood pressure control and reduction: including making healthy food choices, managing weight, getting regular exercise, smoking cessation, reducing alcohol consumption, monitoring blood pressure regularly.     Symptoms: None    BP Goal:< 140/90 mmHg    BP Assessment:  BP at goal    Potential Reasons for BP too high: NA - Not applicable    BP Follow-Up Plan: Recheck BP in 6 months at pharmacy    Recommendation to Provider: none    Note completed by Mariana Florian Rph  [facility]  [address] [place] [address] [place] [id number]  [phone]

## 2021-03-17 ENCOUNTER — OFFICE VISIT (OUTPATIENT)
Dept: OPTOMETRY | Facility: CLINIC | Age: 56
End: 2021-03-17
Payer: COMMERCIAL

## 2021-03-17 DIAGNOSIS — E11.9 TYPE 2 DIABETES MELLITUS WITHOUT COMPLICATION, WITHOUT LONG-TERM CURRENT USE OF INSULIN (H): Primary | ICD-10-CM

## 2021-03-17 DIAGNOSIS — H52.4 PRESBYOPIA: ICD-10-CM

## 2021-03-17 DIAGNOSIS — H04.123 DRY EYES: ICD-10-CM

## 2021-03-17 DIAGNOSIS — H11.153 PINGUECULA OF BOTH EYES: ICD-10-CM

## 2021-03-17 DIAGNOSIS — H52.03 HYPEROPIA, BILATERAL: ICD-10-CM

## 2021-03-17 PROCEDURE — 92004 COMPRE OPH EXAM NEW PT 1/>: CPT | Performed by: OPTOMETRIST

## 2021-03-17 PROCEDURE — 92015 DETERMINE REFRACTIVE STATE: CPT | Performed by: OPTOMETRIST

## 2021-03-17 ASSESSMENT — VISUAL ACUITY
OD_SC: 20/25
OS_SC: 20/40-1
OS_SC: 20/20
OD_SC: 20/50
METHOD: SNELLEN - LINEAR
OD_SC+: -1
OS_SC+: -2

## 2021-03-17 ASSESSMENT — REFRACTION_MANIFEST
OS_CYLINDER: SPHERE
OD_AXIS: 010
OS_CYLINDER: +0.50
OD_SPHERE: +0.75
OS_SPHERE: +0.50
OS_AXIS: 023
OS_SPHERE: +0.50
OD_CYLINDER: +0.25
OD_ADD: +1.50
METHOD_AUTOREFRACTION: 1
OD_AXIS: 014
OS_ADD: +1.50
OD_CYLINDER: +0.25
OD_SPHERE: +1.00

## 2021-03-17 ASSESSMENT — EXTERNAL EXAM - RIGHT EYE: OD_EXAM: NORMAL

## 2021-03-17 ASSESSMENT — KERATOMETRY
OS_K2POWER_DIOPTERS: 44.00
OS_K1POWER_DIOPTERS: 44.25
OD_AXISANGLE2_DEGREES: 158
OD_K2POWER_DIOPTERS: 44.50
OS_AXISANGLE2_DEGREES: 30
OD_K1POWER_DIOPTERS: 44.25

## 2021-03-17 ASSESSMENT — SLIT LAMP EXAM - LIDS
COMMENTS: NORMAL
COMMENTS: NORMAL

## 2021-03-17 ASSESSMENT — EXTERNAL EXAM - LEFT EYE: OS_EXAM: NORMAL

## 2021-03-17 ASSESSMENT — CUP TO DISC RATIO
OD_RATIO: 0.2
OS_RATIO: 0.2

## 2021-03-17 ASSESSMENT — TONOMETRY
OS_IOP_MMHG: 16
IOP_METHOD: APPLANATION
OD_IOP_MMHG: 16

## 2021-03-17 ASSESSMENT — CONF VISUAL FIELD
OD_NORMAL: 1
OS_NORMAL: 1
METHOD: COUNTING FINGERS

## 2021-03-17 NOTE — PATIENT INSTRUCTIONS
Patient was advised of today's exam findings.  Over the counter reading glasses +1.25 to +1.50  Use over the counter artificial tears 2 times a day (Thera Tears Extra, or  Systane Ultra )  Use Lumify eye drops 1-2 times  a day to decrease red eyes .    Keep blood sugar under good control  Return in 1 year for diabetic eye exam      Diabetes weakens the blood vessels all over the body, including the eyes. Damage to the blood vessels in the eyes can cause swelling or bleeding into part of the eye (called the retina). This is called diabetic retinopathy (SAE-tin-AH-puh-thee). If not treated, this disease can cause vision loss or blindness.   Symptoms may include blurred or distorted vision, but many people have no symptoms. It's important to see your eye doctor regularly to check for problems.   Early treatment and good control can help protect your vision. Here are the things you can do to help prevent vision loss:      1. Keep your blood sugar levels under tight control.      2. Bring high blood pressure under control.      3. No smoking.      4. Have yearly dilated eye exams.      Elysia Aquino O.D.  [facility]   [address] [place] [address] [phone]

## 2021-03-17 NOTE — PROGRESS NOTES
Chief Complaint   Patient presents with     Diabetic Eye Exam     New to Eye Dept      Accompanied by son    Hemoglobin A1C   Date Value Ref Range Status   09/03/2020 6.3 (H) 0 - 5.6 % Final     Comment:     Normal <5.7% Prediabetes 5.7-6.4%  Diabetes 6.5% or higher - adopted from ADA   consensus guidelines.     10/10/2019 6.7 (H) 0 - 5.6 % Final     Comment:     Normal <5.7% Prediabetes 5.7-6.4%  Diabetes 6.5% or higher - adopted from ADA   consensus guidelines.     04/11/2019 6.0 (H) 0 - 5.6 % Final     Comment:     Normal <5.7% Prediabetes 5.7-6.4%  Diabetes 6.5% or higher - adopted from ADA   consensus guidelines.           Last Eye Exam: 4/12/2016 At [facility]  Dilated Previously: Yes    What are you currently using to see?  Readers, he thinks that they are +1.00's    Distance Vision Acuity: Satisfied with vision, no changes     Near Vision Acuity: Not satisfied, trouble with the smaller print. Having to use the readers      Eye Comfort: Eyes get red -watching TV and with computer use   Do you use eye drops? : Yes: As needed for red and irritated   Occupation or Hobbies: Counselor     Miriam Espinosa Optometric Assistant      Medical, surgical and family histories reviewed and updated 3/17/2021.       OBJECTIVE: See Ophthalmology exam    ASSESSMENT:    ICD-10-CM    1. Type 2 diabetes mellitus without complication, without long-term current use of insulin (H)  E11.9 EYE EXAM (SIMPLE-NONBILLABLE)     REFRACTION   2. Presbyopia  H52.4 EYE EXAM (SIMPLE-NONBILLABLE)     REFRACTION   3. Hyperopia, bilateral  H52.03 EYE EXAM (SIMPLE-NONBILLABLE)     REFRACTION   4. Dry eyes  H04.123 EYE EXAM (SIMPLE-NONBILLABLE)     REFRACTION   5. Pinguecula of both eyes  H11.153 EYE EXAM (SIMPLE-NONBILLABLE)     REFRACTION      PLAN:    rGegg leger  eye exam results will be sent to Mohit Randall.  Patient Instructions   Patient was advised of today's exam findings.  Over the counter reading glasses +1.25 to +1.50  Use over  the counter artificial tears 2 times a day (Thera Tears Extra, or  Systane Ultra )  Use Lumify eye drops 1-2 times  a day to decrease red eyes .    Keep blood sugar under good control  Return in 1 year for diabetic eye exam      Diabetes weakens the blood vessels all over the body, including the eyes. Damage to the blood vessels in the eyes can cause swelling or bleeding into part of the eye (called the retina). This is called diabetic retinopathy (SAE-tin--puh-thee). If not treated, this disease can cause vision loss or blindness.   Symptoms may include blurred or distorted vision, but many people have no symptoms. It's important to see your eye doctor regularly to check for problems.   Early treatment and good control can help protect your vision. Here are the things you can do to help prevent vision loss:      1. Keep your blood sugar levels under tight control.      2. Bring high blood pressure under control.      3. No smoking.      4. Have yearly dilated eye exams.      Elysia Aquino O.D.  [facility]   [address] [place], [place] [id number] [phone]

## 2021-03-17 NOTE — LETTER
3/17/2021         RE: Gregg Lua  [address] [address] [address]        Dear Colleague,    Thank you for referring your patient, Gregg Lua, to the [facility]. No diabetic retinopathy was found at eye exam.     Again, thank you for allowing me to participate in the care of your patient.        Sincerely,        Elysia Aquino OD

## 2021-03-21 ENCOUNTER — HEALTH MAINTENANCE LETTER (OUTPATIENT)
Age: 56
End: 2021-03-21

## 2021-05-20 DIAGNOSIS — E11.9 TYPE 2 DIABETES MELLITUS WITHOUT COMPLICATION, WITHOUT LONG-TERM CURRENT USE OF INSULIN (H): ICD-10-CM

## 2021-05-20 RX ORDER — METFORMIN HCL 500 MG
TABLET, EXTENDED RELEASE 24 HR ORAL
Qty: 360 TABLET | Refills: 0 | Status: SHIPPED | OUTPATIENT
Start: 2021-05-20 | End: 2021-06-22

## 2021-05-20 NOTE — LETTER
May 20, 2021    Gregg Lua  [address] [address] [address]    Dear Gregg,       We recently received a refill request for metFORMIN (GLUCOPHAGE-XR) 500 MG 24 hr tablet.  We have refilled this for one time only because you are due for a:    Diabetes office visit      Please call at your earliest convenience so that there will not be a delay with your future refills.          Thank you,   Your [facility] Team/  [phone]

## 2021-05-20 NOTE — TELEPHONE ENCOUNTER
Routing refill request to provider for review/approval because:  Labs not current:  A1C    Winifred MCRAEN, RN

## 2021-06-21 ENCOUNTER — TELEPHONE (OUTPATIENT)
Dept: FAMILY MEDICINE | Facility: CLINIC | Age: 56
End: 2021-06-21

## 2021-06-21 DIAGNOSIS — E11.9 TYPE 2 DIABETES MELLITUS WITHOUT COMPLICATION, WITHOUT LONG-TERM CURRENT USE OF INSULIN (H): ICD-10-CM

## 2021-06-21 NOTE — TELEPHONE ENCOUNTER
Reason for call:  Medication   If this is a refill request, has the caller requested the refill from the pharmacy already? Yes  Will the patient be using a [facility]? No  Name of the pharmacy and phone number for the current request: [facility]     [id number] [facility] [place]   [phone]      Name of the medication requested: Metformin (Glucophage-XR) 500 MG    Other request: Patient scheduled on 6/29/2021  Patient is needing medication due to patient currently has 8 tablets left  and patient is needing more in order to last until appointment. Please call patient to follow up.    Phone number to reach patient:  Home number on file [phone] (home)    Best Time:  Any time     Can we leave a detailed message on this number?  YES

## 2021-06-22 RX ORDER — METFORMIN HCL 500 MG
TABLET, EXTENDED RELEASE 24 HR ORAL
Qty: 360 TABLET | Refills: 1 | Status: SHIPPED | OUTPATIENT
Start: 2021-06-22 | End: 2021-06-29

## 2021-06-29 ENCOUNTER — OFFICE VISIT (OUTPATIENT)
Dept: FAMILY MEDICINE | Facility: CLINIC | Age: 56
End: 2021-06-29
Payer: COMMERCIAL

## 2021-06-29 VITALS
RESPIRATION RATE: 14 BRPM | OXYGEN SATURATION: 98 % | SYSTOLIC BLOOD PRESSURE: 136 MMHG | DIASTOLIC BLOOD PRESSURE: 80 MMHG | TEMPERATURE: 98.6 F | WEIGHT: 243 LBS | BODY MASS INDEX: 36.83 KG/M2 | HEIGHT: 68 IN | HEART RATE: 96 BPM

## 2021-06-29 DIAGNOSIS — G47.33 OSA (OBSTRUCTIVE SLEEP APNEA): ICD-10-CM

## 2021-06-29 DIAGNOSIS — I10 HYPERTENSION GOAL BP (BLOOD PRESSURE) < 140/90: ICD-10-CM

## 2021-06-29 DIAGNOSIS — E11.9 TYPE 2 DIABETES MELLITUS WITHOUT COMPLICATION, WITHOUT LONG-TERM CURRENT USE OF INSULIN (H): Primary | ICD-10-CM

## 2021-06-29 PROBLEM — J20.9 ACUTE BRONCHITIS WITH SYMPTOMS > 10 DAYS: Status: RESOLVED | Noted: 2019-12-27 | Resolved: 2021-06-29

## 2021-06-29 LAB
ANION GAP SERPL CALCULATED.3IONS-SCNC: 4 MMOL/L (ref 3–14)
BUN SERPL-MCNC: 12 MG/DL (ref 7–30)
CALCIUM SERPL-MCNC: 9.5 MG/DL (ref 8.5–10.1)
CHLORIDE SERPL-SCNC: 109 MMOL/L (ref 94–109)
CHOLEST SERPL-MCNC: 163 MG/DL
CO2 SERPL-SCNC: 27 MMOL/L (ref 20–32)
CREAT SERPL-MCNC: 1.07 MG/DL (ref 0.66–1.25)
GFR SERPL CREATININE-BSD FRML MDRD: 77 ML/MIN/{1.73_M2}
GLUCOSE SERPL-MCNC: 105 MG/DL (ref 70–99)
HBA1C MFR BLD: 6.6 % (ref 0–5.6)
HDLC SERPL-MCNC: 42 MG/DL
LDLC SERPL CALC-MCNC: 81 MG/DL
NONHDLC SERPL-MCNC: 121 MG/DL
POTASSIUM SERPL-SCNC: 4.1 MMOL/L (ref 3.4–5.3)
SODIUM SERPL-SCNC: 140 MMOL/L (ref 133–144)
TRIGL SERPL-MCNC: 201 MG/DL

## 2021-06-29 PROCEDURE — 99214 OFFICE O/P EST MOD 30 MIN: CPT | Performed by: PHYSICIAN ASSISTANT

## 2021-06-29 PROCEDURE — 80048 BASIC METABOLIC PNL TOTAL CA: CPT | Performed by: PHYSICIAN ASSISTANT

## 2021-06-29 PROCEDURE — 36415 COLL VENOUS BLD VENIPUNCTURE: CPT | Performed by: PHYSICIAN ASSISTANT

## 2021-06-29 PROCEDURE — 83036 HEMOGLOBIN GLYCOSYLATED A1C: CPT | Performed by: PHYSICIAN ASSISTANT

## 2021-06-29 PROCEDURE — 80061 LIPID PANEL: CPT | Performed by: PHYSICIAN ASSISTANT

## 2021-06-29 PROCEDURE — 99207 PR FOOT EXAM NO CHARGE: CPT | Performed by: PHYSICIAN ASSISTANT

## 2021-06-29 RX ORDER — AMLODIPINE BESYLATE 10 MG/1
10 TABLET ORAL DAILY
Qty: 90 TABLET | Refills: 1 | Status: SHIPPED | OUTPATIENT
Start: 2021-06-29 | End: 2021-11-11

## 2021-06-29 RX ORDER — METFORMIN HCL 500 MG
TABLET, EXTENDED RELEASE 24 HR ORAL
Qty: 360 TABLET | Refills: 1 | Status: SHIPPED | OUTPATIENT
Start: 2021-06-29 | End: 2021-10-22

## 2021-06-29 RX ORDER — LISINOPRIL 20 MG/1
20 TABLET ORAL DAILY
Qty: 90 TABLET | Refills: 1 | Status: SHIPPED | OUTPATIENT
Start: 2021-06-29 | End: 2021-11-11

## 2021-06-29 RX ORDER — ATORVASTATIN CALCIUM 10 MG/1
10 TABLET, FILM COATED ORAL DAILY
Qty: 90 TABLET | Refills: 1 | Status: SHIPPED | OUTPATIENT
Start: 2021-06-29 | End: 2021-11-11 | Stop reason: DRUGHIGH

## 2021-06-29 ASSESSMENT — MIFFLIN-ST. JEOR: SCORE: 1901.74

## 2021-06-29 NOTE — Clinical Note
Please abstract the following data from this visit with this patient into the appropriate field in Epic:    Tests that can be patient reported without a hard copy:    Colonoscopy done on this date: 2016 (approximately), by this group: [facility], results were normal.     Other Tests found in the patient's chart through Chart Review/Care Everywhere:    {Abstract Quality List (Optional):[id number]}    Note to Abstraction: If this section is blank, no results were found via Chart Review/Care Everywhere.

## 2021-06-29 NOTE — PROGRESS NOTES
"    Assessment & Plan       ICD-10-CM    1. Type 2 diabetes mellitus without complication, without long-term current use of insulin (H)  E11.9 Lipid panel reflex to direct LDL Fasting     Basic metabolic panel     Hemoglobin A1c     FOOT EXAM     metFORMIN (GLUCOPHAGE-XR) 500 MG 24 hr tablet     atorvastatin (LIPITOR) 10 MG tablet   2. Hypertension goal BP (blood pressure) < 140/90  I10 Basic metabolic panel     lisinopril (ZESTRIL) 20 MG tablet     amLODIPine (NORVASC) 10 MG tablet   3. DANIELLE (obstructive sleep apnea)  G47.33 SLEEP EVALUATION & MANAGEMENT REFERRAL - [facility]  [phone] (Age 15 and up)   medical conditions are stable. meds refilled.  Work on Healthy diet and exercise. Getting heart rate elevated for 30 mins most days of week.  Labs pending  Follow up  Dependant on labs.      BMI:   Estimated body mass index is 36.95 kg/m  as calculated from the following:    Height as of this encounter: 1.727 m (5' 8\").    Weight as of this encounter: 110.2 kg (243 lb).   Weight management plan: Discussed healthy diet and exercise guidelines      Return in about 6 months (around 12/29/2021) for recheck.    DOREEN [facility]    Liz Escobedo is a 56 year old who presents for the following health issues     History of Present Illness       Diabetes:   He presents for follow up of diabetes.  He is checking home blood glucose a few times a week. He checks blood glucose before meals.  Blood glucose is never over 200 and never under 70. He is aware of hypoglycemia symptoms including shakiness. He is concerned about other.  He is not experiencing numbness or burning in feet, excessive thirst, blurry vision, weight changes or redness, sores or blisters on feet.             Hyperlipidemia Follow-Up      Are you regularly taking any medication or supplement to lower your cholesterol?   Has been out for a while of the atorvastatin     Are you " "having muscle aches or other side effects that you think could be caused by your cholesterol lowering medication?  No    Hypertension Follow-up      Do you check your blood pressure regularly outside of the clinic? No     Are you following a low salt diet? No    Are your blood pressures ever more than 140 on the top number (systolic) OR more   than 90 on the bottom number (diastolic), for example 140/90? Unsure     BP Readings from Last 2 Encounters:   06/29/21 136/80   01/19/21 120/80     Hemoglobin A1C (%)   Date Value   09/03/2020 6.3 (H)   10/10/2019 6.7 (H)     LDL Cholesterol Calculated (mg/dL)   Date Value   09/03/2020 28   04/11/2019 35       Review of Systems   Constitutional, HEENT, cardiovascular, pulmonary, gi and gu systems are negative, except as otherwise noted.      Objective    /80   Pulse 96   Temp 98.6  F (37  C) (Tympanic)   Resp 14   Ht 1.727 m (5' 8\")   Wt 110.2 kg (243 lb)   SpO2 98%   BMI 36.95 kg/m    Body mass index is 36.95 kg/m .  Physical Exam   GENERAL: healthy, alert and no distress  EYES: Eyes grossly normal to inspection, PERRL and conjunctivae and sclerae normal  HENT: ear canals and TM's normal, nose and mouth without ulcers or lesions  NECK: no adenopathy, no asymmetry, masses, or scars and thyroid normal to palpation  RESP: lungs clear to auscultation - no rales, rhonchi or wheezes  CV: regular rate and rhythm, normal S1 S2, no S3 or S4, no murmur, click or rub, no peripheral edema and peripheral pulses strong  ABDOMEN: soft, nontender, no hepatosplenomegaly, no masses and bowel sounds normal  MS: no gross musculoskeletal defects noted, no edema  SKIN: no suspicious lesions or rashes  NEURO: Normal strength and tone, mentation intact and speech normal  PSYCH: mentation appears normal, affect normal/bright  Diabetic foot exam: normal DP and PT pulses, no trophic changes or ulcerative lesions, normal sensory exam and normal monofilament exam    Labs pending            "

## 2021-08-24 NOTE — PROGRESS NOTES
Gregg is a 56 year old who is being evaluated via a billable video visit.      How would you like to obtain your AVS? Mail a copy  If the video visit is dropped, the invitation should be resent by: Send to e-mail at: [email or web address]  Will anyone else be joining your video visit? Nia Carter MA    Video Start Time: 3:35 PM  Video-Visit Details    Type of service:  Video Visit    Video End Time:4:11 PM    Originating Location (pt. Location): Home    Distant Location (provider location):  [facility]     Platform used for Video Visit: SprayCool       Sleep Consultation:    Date on this visit: 8/25/2021    Gregg Lua is sent by Mohit Randall for a sleep consultation regarding obstructive sleep apnea.    Primary Physician: Mohit Randall    Chief Complaint   Patient presents with     Consult     DANIELLE, establish care         Gregg Lua is a 56 year old male with medical history remarkable for hypertension, DM type 2, obesity and obstructive sleep apnea.     Patient was diagnosed with severe sleep apnea in 2008. Presenting concerns were loud snoring and daytime sleepiness.     PSG at [place] on 12/13/2008 (214#)-AHI 70, RDI 70, lowest oxygen saturation was 56%, CPAP 14 cm/H20. He is currently on CPAP 12-20 cm/H20.     Weight now is 243 lbs.     Overall, the patient rates his experience with PAP as 8-9 (0 poor, 10 great). The mask is comfortable. The mask is not leaking.  He is not snoring with the mask on. He is not having gasp arousals. He is not having any oral or nasal dryness. The pressure settings are comfortable. The machine is loud.     His PAP interface is Nasal Mask. DME is [place]    Bedtime is typically 1-2 AM. Usually it takes about 15 minutes to fall asleep with the mask on. Wake time is typically 10:00 AM.  Patient is using PAP therapy 6-7 hours per night. The patient is usually getting 6-7 hours of sleep per night.    He does feel rested in  the morning.    Total score - [place]: 5 (8/11/2021 11:53 AM)    Respironics  Auto-PAP 12 - 20 cmH2O 30 day usage data:    97% of days with > 4 hours of use. 0/30 days with no use.   Average use 6 hours and 12 minutes per day.   Average leak 14.4 LPM.  Average % of night in large leak 1.8%.    CPAP 90% pressure 17.8 cm.   AHI 2.1 events per hour.    Patient does use electronics in bed and watch TV in bed.     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 He is a substance abuse counselor.     Patient sleeps on his side. He has occasional morning headaches, no RLS symptsoms. Gregg denies any sleep walking, sleep talking, dream enactment, sleep paralysis, cataplexy and hypnogogic/hypnopompic hallucinations.    Gregg denies difficulty breathing through his nose.      Gregg does nap occasionally. He denies falling asleep while driving.  Patient was counseled on the importance of driving while alert, to pull over if drowsy, or nap before getting into the vehicle if sleepy.  He uses 1-2 cups/day of coffee. Last caffeine intake is usually before noon.    Allergies:    No Known Allergies    Medications:    Current Outpatient Medications   Medication Sig Dispense Refill     albuterol (PROAIR HFA/PROVENTIL HFA/VENTOLIN HFA) 108 (90 Base) MCG/ACT inhaler Inhale 2 puffs into the lungs every 6 hours 1 Inhaler 0     alcohol swab prep pads Use to swab area of injection/lyle as directed. Pt tests two times daily. 200 each 3     amLODIPine (NORVASC) 10 MG tablet Take 1 tablet (10 mg) by mouth daily 90 tablet 1     atorvastatin (LIPITOR) 10 MG tablet Take 1 tablet (10 mg) by mouth daily 90 tablet 1     blood glucose (NO BRAND SPECIFIED) test strip Use to test blood sugar 2 times daily or as directed. To accompany: Blood Glucose Monitor Brands: per insurance. 200 strip 3     blood glucose calibration (NO BRAND SPECIFIED) solution To accompany: Blood Glucose Monitor Brands: per insurance. 1 Bottle 3     blood glucose monitoring (NO BRAND SPECIFIED) meter  device kit Use to test blood sugar 2 times daily or as directed. Brand  per insurance. 1 kit 0     blood glucose monitoring (ONE TOUCH DELICA) lancets Use to test blood sugar 2 times daily or as directed.  Ok to substitute alternative if insurance prefers. 100 each 11     fluticasone (FLONASE) 50 MCG/ACT nasal spray Spray 1-2 sprays into both nostrils daily 1 g 11     lisinopril (ZESTRIL) 20 MG tablet Take 1 tablet (20 mg) by mouth daily 90 tablet 1     metFORMIN (GLUCOPHAGE-XR) 500 MG 24 hr tablet TAKE 4 TABLETS(2000 MG) BY MOUTH DAILY WITH DINNER 360 tablet 1     order for DME Equipment being ordered: BP monitor 1 Device 1     thin (NO BRAND SPECIFIED) lancets Use with lanceting device. To accompany: Blood Glucose Monitor Brands: per insurance. Pt tests two times daily. 200 each 3     UNKNOWN TO PATIENT Little blue one for diabetes       cyclobenzaprine (FLEXERIL) 5 MG tablet May take 1 or 2 tablets 3 times a day as needed for muscle spasm and pain. Sedating. Preferably take at bedtime (Patient not taking: Reported on 5/5/2020) 60 tablet 0     methocarbamol (ROBAXIN) 750 MG tablet Take 1 tablet (750 mg) by mouth 3 times daily (Patient not taking: Reported on 12/22/2020) 30 tablet 0       Problem List:  Patient Active Problem List    Diagnosis Date Noted     Seasonal allergic rhinitis, unspecified trigger 09/03/2020     Priority: Medium     FH: MI (myocardial infarction) 09/03/2020     Priority: Medium     DANIELLE (obstructive sleep apnea) 10/10/2019     Priority: Medium     PSG at [place] on 12/13/2008 (214#)-AHI 70, rid 70, lowest oxygen saturation was 56%, CPAP 14 cm/H20.       Obesity (BMI 35.0-39.9) with comorbidity (H) 06/19/2019     Priority: Medium     Type 2 diabetes mellitus without complication, without long-term current use of insulin (H) 03/27/2019     Priority: Medium     Hypertension goal BP (blood pressure) < 140/90 03/27/2019     Priority: Medium        Past Medical/Surgical History:  Past Medical  History:   Diagnosis Date     Diabetes mellitus, type 2 (H)      High cholesterol      Hypertension      Past Surgical History:   Procedure Laterality Date     GALLBLADDER SURGERY         Social History:  Social History     Socioeconomic History     Marital status:      Spouse name: Not on file     Number of children: Not on file     Years of education: Not on file     Highest education level: Not on file   Occupational History     Not on file   Tobacco Use     Smoking status: Never Smoker     Smokeless tobacco: Never Used   Substance and Sexual Activity     Alcohol use: No     Drug use: No     Sexual activity: Yes     Partners: Female   Other Topics Concern     Not on file   Social History Narrative     Not on file     Social Determinants of Health     Financial Resource Strain:      Difficulty of Paying Living Expenses:    Food Insecurity:      Worried About Running Out of Food in the Last Year:      Ran Out of Food in the Last Year:    Transportation Needs:      Lack of Transportation (Medical):      Lack of Transportation (Non-Medical):    Physical Activity:      Days of Exercise per Week:      Minutes of Exercise per Session:    Stress:      Feeling of Stress :    Social Connections:      Frequency of Communication with Friends and Family:      Frequency of Social Gatherings with Friends and Family:      Attends Zoroastrianism Services:      Active Member of Clubs or Organizations:      Attends Club or Organization Meetings:      Marital Status:    Intimate Partner Violence:      Fear of Current or Ex-Partner:      Emotionally Abused:      Physically Abused:      Sexually Abused:        Family History:  Family History   Problem Relation Age of Onset     Diabetes Mother      Eye Surgery Mother         cataracts     Heart Disease Father         age 36 MI .      Diabetes Brother      Diabetes Sister      Diabetes Sister      Diabetes Brother      Macular Degeneration No family hx of        Review of  "Systems:  A complete review of systems reviewed by me is negative with the exeption of what has been mentioned in the history of present illness.  CONSTITUTIONAL: NEGATIVE for weight gain/loss, fever, chills, sweats or night sweats, drug allergies.  EYES: NEGATIVE for changes in vision, blind spots, double vision.  ENT: NEGATIVE for ear pain, sore throat, sinus pain, post-nasal drip, runny nose, bloody nose  CARDIAC: NEGATIVE for fast heartbeats or fluttering in chest, chest pain or pressure, breathlessness when lying flat, swollen legs or swollen feet.  NEUROLOGIC: NEGATIVE headaches, weakness or numbness in the arms or legs.  DERMATOLOGIC: NEGATIVE for rashes, new moles or change in mole(s)  PULMONARY: NEGATIVE SOB at rest, SOB with activity, dry cough, productive cough, coughing up blood, wheezing or whistling when breathing.    GASTROINTESTINAL: NEGATIVE for nausea or vomitting, loose or watery stools, fat or grease in stools, constipation, abdominal pain, bowel movements black in color or blood noted.  GENITOURINARY: NEGATIVE for pain during urination, blood in urine, urinating more frequently than usual, irregular menstrual periods.  MUSCULOSKELETAL: NEGATIVE for muscle pain, bone or joint pain, swollen joints.  ENDOCRINE: NEGATIVE for increased thirst or urination, diabetes.  LYMPHATIC: NEGATIVE for swollen lymph nodes, lumps or bumps in the breasts or nipple discharge.    Physical Examination:  Vitals: Ht 1.727 m (5' 8\")   Wt 110.2 kg (243 lb)   BMI 36.95 kg/m    BMI= Body mass index is 36.95 kg/m .         [place] Total Score 8/11/2021   Total score - [place] 5            GENERAL APPEARANCE: alert and no distress  EYES: Eyes grossly normal to inspection  RESP: breathing is non-labored  NEURO: mentation intact and speech normal  PSYCH: mentation appears normal and affect normal/bright    Last Comprehensive Metabolic Panel:  Sodium   Date Value Ref Range Status   06/29/2021 140 133 - 144 mmol/L Final "     Potassium   Date Value Ref Range Status   06/29/2021 4.1 3.4 - 5.3 mmol/L Final     Chloride   Date Value Ref Range Status   06/29/2021 109 94 - 109 mmol/L Final     Carbon Dioxide   Date Value Ref Range Status   06/29/2021 27 20 - 32 mmol/L Final     Anion Gap   Date Value Ref Range Status   06/29/2021 4 3 - 14 mmol/L Final     Glucose   Date Value Ref Range Status   06/29/2021 105 (H) 70 - 99 mg/dL Final     Comment:     Non Fasting     Urea Nitrogen   Date Value Ref Range Status   06/29/2021 12 7 - 30 mg/dL Final     Creatinine   Date Value Ref Range Status   06/29/2021 1.07 0.66 - 1.25 mg/dL Final     GFR Estimate   Date Value Ref Range Status   06/29/2021 77 >60 mL/min/[1.73_m2] Final     Comment:     Non  GFR Calc  Starting 12/18/2018, serum creatinine based estimated GFR (eGFR) will be   calculated using the Chronic Kidney Disease Epidemiology Collaboration   (CKD-EPI) equation.       Calcium   Date Value Ref Range Status   06/29/2021 9.5 8.5 - 10.1 mg/dL Final     TSH   Date Value Ref Range Status   04/11/2019 1.78 0.40 - 4.00 mU/L Final     Impression/Plan:    1. Severe obstructive sleep apnea-  Excellent CPAP compliance and AHI is well controlled on CPAP  12-18  Cm/H20.  Today we reviewed inspire therapy. He decided to continue on CPAP.  His current machine is loud. Order for a new CPAP placed.   Comprehensive DME      Literature provided regarding sleep apnea.      He will follow up with me in 87 weeks if compliance review is required. If not follow up in a year.    Old olysomnography reviewed.  Obstructive sleep apnea reviewed.  Complications of untreated sleep apnea were reviewed.    Daniel Becerril PA-C    CC: Mohit Randall

## 2021-08-25 ENCOUNTER — VIRTUAL VISIT (OUTPATIENT)
Dept: SLEEP MEDICINE | Facility: CLINIC | Age: 56
End: 2021-08-25
Attending: PHYSICIAN ASSISTANT
Payer: COMMERCIAL

## 2021-08-25 VITALS — WEIGHT: 243 LBS | HEIGHT: 68 IN | BODY MASS INDEX: 36.83 KG/M2

## 2021-08-25 DIAGNOSIS — G47.33 OSA (OBSTRUCTIVE SLEEP APNEA): Primary | ICD-10-CM

## 2021-08-25 PROBLEM — E66.01 MORBID OBESITY (H): Chronic | Status: ACTIVE | Noted: 2019-06-19

## 2021-08-25 PROBLEM — I10 HYPERTENSION GOAL BP (BLOOD PRESSURE) < 140/90: Chronic | Status: ACTIVE | Noted: 2019-03-27

## 2021-08-25 PROBLEM — E11.9 TYPE 2 DIABETES MELLITUS WITHOUT COMPLICATION, WITHOUT LONG-TERM CURRENT USE OF INSULIN (H): Chronic | Status: ACTIVE | Noted: 2019-03-27

## 2021-08-25 PROCEDURE — 99203 OFFICE O/P NEW LOW 30 MIN: CPT | Mod: 95 | Performed by: PHYSICIAN ASSISTANT

## 2021-08-25 ASSESSMENT — MIFFLIN-ST. JEOR: SCORE: 1901.74

## 2021-08-25 NOTE — PATIENT INSTRUCTIONS
Your BMI is Body mass index is 36.95 kg/m .  Weight management is a personal decision.  If you are interested in exploring weight loss strategies, the following discussion covers the approaches that may be successful. Body mass index (BMI) is one way to tell whether you are at a healthy weight, overweight, or obese. It measures your weight in relation to your height.  A BMI of 18.5 to 24.9 is in the healthy range. A person with a BMI of 25 to 29.9 is considered overweight, and someone with a BMI of 30 or greater is considered obese. More than two-thirds of American adults are considered overweight or obese.  Being overweight or obese increases the risk for further weight gain. Excess weight may lead to heart disease and diabetes.  Creating and following plans for healthy eating and physical activity may help you improve your health.  Weight control is part of healthy lifestyle and includes exercise, emotional health, and healthy eating habits. Careful eating habits lifelong are the mainstay of weight control. Though there are significant health benefits from weight loss, long-term weight loss with diet alone may be very difficult to achieve- studies show long-term success with dietary management in less than 10% of people. Attaining a healthy weight may be especially difficult to achieve in those with severe obesity. In some cases, medications, devices and surgical management might be considered.  What can you do?  If you are overweight or obese and are interested in methods for weight loss, you should discuss this with your provider.     Consider reducing daily calorie intake by 500 calories.     Keep a food journal.     Avoiding skipping meals, consider cutting portions instead.    Diet combined with exercise helps maintain muscle while optimizing fat loss. Strength training is particularly important for building and maintaining muscle mass. Exercise helps reduce stress, increase energy, and improves fitness.  Increasing exercise without diet control, however, may not burn enough calories to loose weight.       Start walking three days a week 10-20 minutes at a time    Work towards walking thirty minutes five days a week     Eventually, increase the speed of your walking for 1-2 minutes at time    In addition, we recommend that you review healthy lifestyles and methods for weight loss available through the National Institutes of Health patient information sites:  http://win.niddk.nih.gov/publications/index.htm    And look into health and wellness programs that may be available through your health insurance provider, employer, local community center, or alanis club.    Weight management plan: Patient was referred to their PCP to discuss a diet and exercise plan.         Your sleep apnea treatment may be affected by device recall    Our records show that you may have a Joe Respironics CPAP for the treatment of sleep apnea. Many of these devices have been recalled* by the  for replacement. [facility] recommends:     1) If you are using a Resmed device, continue using the device.  2) If you have a Joe Respironics device, register your device for confirmation of type of device and repair of the device at https://www.[employer]/healthcare/e/sleep/communications/src-update -if you cannot use link, call [phone].  The website will assist you in obtaining the serial number for registration.   3) If you are using a Joe Respironics CPAP or Bilevel PAP device and you do not have immediate breathing, driving or cardiovascular risks without the device, consider stopping use of the device after verification that is has been recalled. Discuss this decision with your medical provider if you are uncertain about your medical risks.  4) If you are not using Respironics CPAP but are using a Respironics advanced device for breathing support (AVAPS, ASV, Bilevel PAP), continue using the device and  review 5 and 6 below).     5) If you continue the device, do not include ozone generating  connected to PAP devices.  6) f you continue the device, you may choose to use a bacterial filter to reduce particulates from the device although on CPAP devices, pressures may need to be increased with the filter in place. These filters are not as effective as repair of the device.     ?       You may also choose discuss with your provider alternative approaches to treatment.      *[employer] is voluntarily recalling the below devices due to two (2) issues related to the polyester-based polyurethane (PE-PUR) sound abatement foam used in Joe Continuous and Non-Continuous Ventilators: 1) PE-PUR foam may degrade into particles which may enter the device's the air pathway and be ingested or inhaled by the user, and 2) the PE-PUR foam may off-gas certain chemicals. The foam degradation may be exacerbated by use of unapproved cleaning methods, such as ozone (see FDA safety communication on use of Ozone ), and off-gassing may occur during initial operation and may possibly continue throughout the device's useful life.   These issues can result in serious injury which can be life-threatening, cause permanent impairment, and/or require medical intervention to preclude permanent impairment. To date, Joe Respironics has received several complaints regarding the presence of black debris/particles within the airpath circuit (extending from the device outlet, humidifier, tubing, and mask). Joe also has received reports of headache, upper airway irritation, cough, chest pressure and sinus infection. The potential risks of particulate exposure include: Irritation (skin, eye, and respiratory tract), inflammatory response, headache, asthma, adverse effects to other organs (e.g. kidneys and liver) and toxic carcinogenic affects. The potential risks of chemical exposure due to off-gassing include:  headache/dizziness, irritation (eyes, nose, respiratory tract, skin), hypersensitivity, nausea/vomiting, toxic and carcinogenic effects. There have been no reports of death as a result of these issues.    Actions to be taken:  Discontinue the use of your device.  Do not continue to use ozone  with the device.     [place] affected devices on the recall website, www.[employer]...[facility]/SRC-update    i. The website provides current information on the status of the recall and how  to receive permanent corrective action to address the two issues.    ii. The website also provides instructions on how to locate an affected device  Serial Number and will guide users through the registration process.    iii. In the [place], call [phone] Service Hotline if you cannot visit the website

## 2021-08-26 NOTE — PROGRESS NOTES
AVS mailed to patient, DME ORDERS faxed to [facility] at [phone].  Natasha Carter MA

## 2021-09-05 ENCOUNTER — HEALTH MAINTENANCE LETTER (OUTPATIENT)
Age: 56
End: 2021-09-05

## 2021-09-08 DIAGNOSIS — E11.9 TYPE 2 DIABETES MELLITUS WITHOUT COMPLICATION, WITHOUT LONG-TERM CURRENT USE OF INSULIN (H): ICD-10-CM

## 2021-09-08 RX ORDER — ATORVASTATIN CALCIUM 10 MG/1
10 TABLET, FILM COATED ORAL DAILY
Qty: 90 TABLET | Refills: 1 | OUTPATIENT
Start: 2021-09-08

## 2021-10-08 DIAGNOSIS — M54.2 NECK PAIN: ICD-10-CM

## 2021-10-08 RX ORDER — CYCLOBENZAPRINE HCL 5 MG
TABLET ORAL
Qty: 60 TABLET | Refills: 0 | OUTPATIENT
Start: 2021-10-08

## 2021-10-11 NOTE — TELEPHONE ENCOUNTER
I don't see that I have ever RX this med for him.  Ok for video visit     Mohit Randall PA-C      
Message read, no appointment made. Lolly ORTEGA - [place]     
Message sent. Lolly Tobin     
Message: Pt would like a new RX. Has been awhile.  
Routing refill request to provider for review/approval because:  Drug not on the [facility] refill protocol   Erendira Jiang BSN, RN    
[facility]

## 2021-10-22 DIAGNOSIS — E11.9 TYPE 2 DIABETES MELLITUS WITHOUT COMPLICATION, WITHOUT LONG-TERM CURRENT USE OF INSULIN (H): ICD-10-CM

## 2021-10-22 RX ORDER — METFORMIN HCL 500 MG
TABLET, EXTENDED RELEASE 24 HR ORAL
Qty: 360 TABLET | Refills: 0 | Status: SHIPPED | OUTPATIENT
Start: 2021-10-22 | End: 2021-11-11

## 2021-11-11 ENCOUNTER — OFFICE VISIT (OUTPATIENT)
Dept: FAMILY MEDICINE | Facility: CLINIC | Age: 56
End: 2021-11-11
Payer: COMMERCIAL

## 2021-11-11 ENCOUNTER — ANCILLARY PROCEDURE (OUTPATIENT)
Dept: GENERAL RADIOLOGY | Facility: CLINIC | Age: 56
End: 2021-11-11
Attending: PHYSICIAN ASSISTANT
Payer: COMMERCIAL

## 2021-11-11 VITALS
TEMPERATURE: 97.4 F | HEIGHT: 68 IN | RESPIRATION RATE: 16 BRPM | SYSTOLIC BLOOD PRESSURE: 137 MMHG | WEIGHT: 240.2 LBS | OXYGEN SATURATION: 97 % | HEART RATE: 97 BPM | BODY MASS INDEX: 36.4 KG/M2 | DIASTOLIC BLOOD PRESSURE: 86 MMHG

## 2021-11-11 DIAGNOSIS — Z23 NEED FOR VACCINATION: ICD-10-CM

## 2021-11-11 DIAGNOSIS — E11.9 TYPE 2 DIABETES MELLITUS WITHOUT COMPLICATION, WITHOUT LONG-TERM CURRENT USE OF INSULIN (H): Primary | ICD-10-CM

## 2021-11-11 DIAGNOSIS — Z23 HIGH PRIORITY FOR 2019-NCOV VACCINE: ICD-10-CM

## 2021-11-11 DIAGNOSIS — Z23 NEED FOR PROPHYLACTIC VACCINATION AND INOCULATION AGAINST INFLUENZA: ICD-10-CM

## 2021-11-11 DIAGNOSIS — M54.2 NECK PAIN: ICD-10-CM

## 2021-11-11 DIAGNOSIS — I10 HYPERTENSION GOAL BP (BLOOD PRESSURE) < 140/90: ICD-10-CM

## 2021-11-11 DIAGNOSIS — E78.00 HIGH CHOLESTEROL: ICD-10-CM

## 2021-11-11 LAB
ANION GAP SERPL CALCULATED.3IONS-SCNC: 4 MMOL/L (ref 3–14)
BUN SERPL-MCNC: 15 MG/DL (ref 7–30)
CALCIUM SERPL-MCNC: 9.8 MG/DL (ref 8.5–10.1)
CHLORIDE BLD-SCNC: 107 MMOL/L (ref 94–109)
CO2 SERPL-SCNC: 29 MMOL/L (ref 20–32)
CREAT SERPL-MCNC: 1.08 MG/DL (ref 0.66–1.25)
CREAT UR-MCNC: 111 MG/DL
GFR SERPL CREATININE-BSD FRML MDRD: 76 ML/MIN/1.73M2
GLUCOSE BLD-MCNC: 98 MG/DL (ref 70–99)
HBA1C MFR BLD: 6 % (ref 0–5.6)
MICROALBUMIN UR-MCNC: 7 MG/L
MICROALBUMIN/CREAT UR: 6.31 MG/G CR (ref 0–17)
POTASSIUM BLD-SCNC: 4.3 MMOL/L (ref 3.4–5.3)
SODIUM SERPL-SCNC: 140 MMOL/L (ref 133–144)

## 2021-11-11 PROCEDURE — 99214 OFFICE O/P EST MOD 30 MIN: CPT | Mod: 25 | Performed by: PHYSICIAN ASSISTANT

## 2021-11-11 PROCEDURE — 0004A COVID-19,PF,PFIZER (12+ YRS): CPT | Performed by: PHYSICIAN ASSISTANT

## 2021-11-11 PROCEDURE — 83036 HEMOGLOBIN GLYCOSYLATED A1C: CPT | Performed by: PHYSICIAN ASSISTANT

## 2021-11-11 PROCEDURE — 90472 IMMUNIZATION ADMIN EACH ADD: CPT | Performed by: PHYSICIAN ASSISTANT

## 2021-11-11 PROCEDURE — 80048 BASIC METABOLIC PNL TOTAL CA: CPT | Performed by: PHYSICIAN ASSISTANT

## 2021-11-11 PROCEDURE — 90682 RIV4 VACC RECOMBINANT DNA IM: CPT | Performed by: PHYSICIAN ASSISTANT

## 2021-11-11 PROCEDURE — 72040 X-RAY EXAM NECK SPINE 2-3 VW: CPT | Performed by: RADIOLOGY

## 2021-11-11 PROCEDURE — 91300 COVID-19,PF,PFIZER (12+ YRS): CPT | Performed by: PHYSICIAN ASSISTANT

## 2021-11-11 PROCEDURE — 90732 PPSV23 VACC 2 YRS+ SUBQ/IM: CPT | Performed by: PHYSICIAN ASSISTANT

## 2021-11-11 PROCEDURE — 36415 COLL VENOUS BLD VENIPUNCTURE: CPT | Performed by: PHYSICIAN ASSISTANT

## 2021-11-11 PROCEDURE — 90471 IMMUNIZATION ADMIN: CPT | Performed by: PHYSICIAN ASSISTANT

## 2021-11-11 PROCEDURE — 82043 UR ALBUMIN QUANTITATIVE: CPT | Performed by: PHYSICIAN ASSISTANT

## 2021-11-11 RX ORDER — CYCLOBENZAPRINE HCL 5 MG
TABLET ORAL
Qty: 60 TABLET | Refills: 0 | Status: SHIPPED | OUTPATIENT
Start: 2021-11-11 | End: 2021-12-14

## 2021-11-11 RX ORDER — GLUCOSAMINE HCL/CHONDROITIN SU 500-400 MG
CAPSULE ORAL
Qty: 100 EACH | Refills: 3 | Status: SHIPPED | OUTPATIENT
Start: 2021-11-11 | End: 2022-08-25

## 2021-11-11 RX ORDER — AMLODIPINE BESYLATE 10 MG/1
10 TABLET ORAL DAILY
Qty: 90 TABLET | Refills: 1 | Status: SHIPPED | OUTPATIENT
Start: 2021-11-11 | End: 2022-08-25

## 2021-11-11 RX ORDER — ATORVASTATIN CALCIUM 40 MG/1
40 TABLET, FILM COATED ORAL DAILY
Qty: 90 TABLET | Refills: 1 | Status: SHIPPED | OUTPATIENT
Start: 2021-11-11 | End: 2022-08-25

## 2021-11-11 RX ORDER — LISINOPRIL 20 MG/1
20 TABLET ORAL DAILY
Qty: 90 TABLET | Refills: 1 | Status: SHIPPED | OUTPATIENT
Start: 2021-11-11 | End: 2022-08-25

## 2021-11-11 RX ORDER — LANCETS
EACH MISCELLANEOUS
Qty: 1 EACH | Refills: 3 | Status: SHIPPED | OUTPATIENT
Start: 2021-11-11 | End: 2022-08-25

## 2021-11-11 RX ORDER — METFORMIN HCL 500 MG
TABLET, EXTENDED RELEASE 24 HR ORAL
Qty: 360 TABLET | Refills: 1 | Status: SHIPPED | OUTPATIENT
Start: 2021-11-11 | End: 2022-08-25

## 2021-11-11 ASSESSMENT — PAIN SCALES - GENERAL: PAINLEVEL: MODERATE PAIN (5)

## 2021-11-11 ASSESSMENT — MIFFLIN-ST. JEOR: SCORE: 1889.04

## 2021-11-11 NOTE — PROGRESS NOTES
Assessment & Plan       ICD-10-CM    1. Type 2 diabetes mellitus without complication, without long-term current use of insulin (H)  E11.9 atorvastatin (LIPITOR) 40 MG tablet     blood glucose (NO BRAND SPECIFIED) test strip     blood glucose calibration (NO BRAND SPECIFIED) solution     thin (NO BRAND SPECIFIED) lancets     alcohol swab prep pads     metFORMIN (GLUCOPHAGE-XR) 500 MG 24 hr tablet     Basic metabolic panel  (Ca, Cl, CO2, Creat, Gluc, K, Na, BUN)     Hemoglobin A1c     Albumin Random Urine Quantitative with Creat Ratio     Basic metabolic panel  (Ca, Cl, CO2, Creat, Gluc, K, Na, BUN)     Hemoglobin A1c     Albumin Random Urine Quantitative with Creat Ratio   2. High cholesterol  E78.00 atorvastatin (LIPITOR) 40 MG tablet   3. Hypertension goal BP (blood pressure) < 140/90  I10 amLODIPine (NORVASC) 10 MG tablet     lisinopril (ZESTRIL) 20 MG tablet   4. Neck pain  M54.2 cyclobenzaprine (FLEXERIL) 5 MG tablet     XR Cervical Spine 2/3 Views     Orthopedic  Referral   5. High priority for 2019-nCoV vaccine  Z23 COVID-19,PF,PFIZER (12+ Yrs PURPLE LABEL)   6. Need for prophylactic vaccination and inoculation against influenza  Z23 INFLUENZA QUAD, RECOMBINANT, P-FREE (RIV4) (FLUBLOK)   7. Need for vaccination  Z23 Pneumococcal vaccine 23 valent PPSV23  (Pneumovax) [36234]   1-2. medical conditions are stable. meds refilled.  3. Will increase is lipitor to 40mg daily based on DM guidelines.   4. Follow up  With sport med         Return in about 6 months (around 5/11/2022) for recheck.    DOREEN George [facility]    Liz Escobedo is a 56 year old who presents for the following health issues     History of Present Illness       Diabetes:   He presents for follow up of diabetes.  He is checking home blood glucose a few times a month. He checks blood glucose before meals.  Blood glucose is never over 200 and never under 70. He is aware of hypoglycemia symptoms  "including shakiness. He has no concerns regarding his diabetes at this time.  He is not experiencing numbness or burning in feet, excessive thirst, blurry vision, weight changes or redness, sores or blisters on feet.           BP Readings from Last 2 Encounters:   11/11/21 137/86   06/29/21 136/80     Hemoglobin A1C (%)   Date Value   06/29/2021 6.6 (H)   09/03/2020 6.3 (H)     LDL Cholesterol Calculated (mg/dL)   Date Value   06/29/2021 81   09/03/2020 28               Hypertension Follow-up      Do you check your blood pressure regularly outside of the clinic? No     Are you following a low salt diet? No    Are your blood pressures ever more than 140 on the top number (systolic) OR more   than 90 on the bottom number (diastolic), for example 140/90? Unsure     Neck pain and upper back pain x 1 year, worse over the last month . Tx: PHYSICAL THERAPY with minimal help.   No numbness/tingling  Pain in left upper trap region. Unable to work out due to pain.   Pain with range of motion no c-spine.       Review of Systems   Constitutional, HEENT, cardiovascular, pulmonary, gi and gu systems are negative, except as otherwise noted.      Objective    /86   Pulse 97   Temp 97.4  F (36.3  C) (Tympanic)   Resp 16   Ht 1.727 m (5' 8\")   Wt 109 kg (240 lb 3.2 oz)   SpO2 97%   BMI 36.52 kg/m    Body mass index is 36.52 kg/m .  Physical Exam   GENERAL: healthy, alert and no distress  EYES: Eyes grossly normal to inspection, PERRL and conjunctivae and sclerae normal  HENT: ear canals and TM's normal, nose and mouth without ulcers or lesions  NECK: no adenopathy, no asymmetry, masses, or scars and thyroid normal to palpation  RESP: lungs clear to auscultation - no rales, rhonchi or wheezes  CV: regular rate and rhythm, normal S1 S2, no S3 or S4, no murmur, click or rub, no peripheral edema and peripheral pulses strong  ABDOMEN: soft, nontender, no hepatosplenomegaly, no masses and bowel sounds normal  MS: no gross " musculoskeletal defects noted, no edema  SKIN: no suspicious lesions or rashes  NEURO: Normal strength and tone, mentation intact and speech normal  PSYCH: mentation appears normal, affect normal/bright  Full range of motion of C-spine with pain with flexion and rotation to the left reproducing pain in the left upper trap.  He has no bony tenderness.  Is full range of motion of bilateral shoulders with 5 out of 5 strength.  He has a negative Neer's Yancey and empty can test.  Mild pain with Spurling's in the upper trap.      X-ray cervical: DJD.  pending radiology  Labs pending.

## 2021-11-11 NOTE — NURSING NOTE
Prior to immunization administration, verified patients identity using patient s name and date of birth. Please see Immunization Activity for additional information.     Screening Questionnaire for Adult Immunization    Are you sick today?   No   Do you have allergies to medications, food, a vaccine component or latex?   No   Have you ever had a serious reaction after receiving a vaccination?   No   Do you have a long-term health problem with heart, lung, kidney, or metabolic disease (e.g., diabetes), asthma, a blood disorder, no spleen, complement component deficiency, a cochlear implant, or a spinal fluid leak?  Are you on long-term aspirin therapy?   No   Do you have cancer, leukemia, HIV/AIDS, or any other immune system problem?   No   Do you have a parent, brother, or sister with an immune system problem?   No   In the past 3 months, have you taken medications that affect  your immune system, such as prednisone, other steroids, or anticancer drugs; drugs for the treatment of rheumatoid arthritis, Crohn s disease, or psoriasis; or have you had radiation treatments?   No   Have you had a seizure, or a brain or other nervous system problem?   No   During the past year, have you received a transfusion of blood or blood    products, or been given immune (gamma) globulin or antiviral drug?   No   For women: Are you pregnant or is there a chance you could become       pregnant during the next month?   No   Have you received any vaccinations in the past 4 weeks?   No     Immunization questionnaire answers were all negative.        Per orders of [facility], injection of pneumonia 23 and flu given by Lucy Wilder CMA. Patient instructed to remain in clinic for 15 minutes afterwards, and to report any adverse reaction to me immediately.       Screening performed by Lucy Wilder CMA on 11/11/2021 at 2:41 PM.

## 2021-11-11 NOTE — RESULT ENCOUNTER NOTE
MrGénesis Stoney,    Your x-ray was read by the radiologist.   You have some arthritis in your neck.     Please contact the clinic if you have additional questions.  Thank you.    Sincerely,    Mohit Randall PA-C

## 2021-12-10 DIAGNOSIS — M54.2 NECK PAIN: ICD-10-CM

## 2021-12-13 NOTE — TELEPHONE ENCOUNTER
Routing refill request to provider for review/approval because:  Drug not on the [facility] refill protocol   Erendira Jiang BSN, RN

## 2021-12-14 RX ORDER — CYCLOBENZAPRINE HCL 5 MG
TABLET ORAL
Qty: 60 TABLET | Refills: 0 | Status: SHIPPED | OUTPATIENT
Start: 2021-12-14 | End: 2023-12-29

## 2021-12-23 ENCOUNTER — TELEPHONE (OUTPATIENT)
Dept: FAMILY MEDICINE | Facility: CLINIC | Age: 56
End: 2021-12-23
Payer: COMMERCIAL

## 2021-12-23 NOTE — TELEPHONE ENCOUNTER
Can someone please print off immunizations for pt? Pt needs record of covid shots. Pt willing to  at , estimated arrival would be 9am tomorrow morning.    Call back if needed [phone], can leave a detailed message.    Jenni Austin,   [facility]

## 2021-12-23 NOTE — TELEPHONE ENCOUNTER
I spoke to the patient and informed him he can come into the clinic and the  can print him a copy of his immunization record.  Daylin Yates,  [facility]

## 2022-01-18 ENCOUNTER — OFFICE VISIT (OUTPATIENT)
Dept: NEUROSURGERY | Facility: CLINIC | Age: 57
End: 2022-01-18
Payer: COMMERCIAL

## 2022-01-18 VITALS — OXYGEN SATURATION: 99 % | HEART RATE: 89 BPM | DIASTOLIC BLOOD PRESSURE: 83 MMHG | SYSTOLIC BLOOD PRESSURE: 134 MMHG

## 2022-01-18 DIAGNOSIS — M54.2 NECK PAIN: ICD-10-CM

## 2022-01-18 DIAGNOSIS — Z86.59 HISTORY OF CLAUSTROPHOBIA: ICD-10-CM

## 2022-01-18 DIAGNOSIS — M54.12 CERVICAL RADICULOPATHY: Primary | ICD-10-CM

## 2022-01-18 PROCEDURE — 99243 OFF/OP CNSLTJ NEW/EST LOW 30: CPT | Performed by: NURSE PRACTITIONER

## 2022-01-18 RX ORDER — DIAZEPAM 5 MG
5 TABLET ORAL
Qty: 2 TABLET | Refills: 0 | Status: SHIPPED | OUTPATIENT
Start: 2022-01-18 | End: 2022-08-25

## 2022-01-18 ASSESSMENT — PAIN SCALES - GENERAL: PAINLEVEL: SEVERE PAIN (6)

## 2022-01-18 NOTE — LETTER
1/18/2022         RE: Gregg Lua  [id number] [address] [facility] [id number]        Dear Colleague,    Thank you for referring your patient, Gregg Lua, to the [facility] NEUROLOGICAL [facility]. Please see a copy of my visit note below.    [facility] Neurosurgery  Neurosurgery Clinic Visit      CC: neck and shoulder pain    Primary care Provider: Mohit Randall    Reason For Visit:   I was asked by Mohit Randall PA-C to consult on the patient for neck pain.    HPI: Gregg Lua is a 56 year old male with a 1 year history of left-sided neck pain. He presents for worsening symptoms. He describes left>right neck pain that radiates to the left shoulder. He denies radicular arm pain, paresthesias and weakness. No bowel/bladder changes or foot drop. He has intact ROM of his neck. He denies alleviating factors. He reports lying on his right side and turning over as an aggravating factor. He describes the pain as a pulsating pain. He has undergone PT and medication management in the past without much benefit.     Pain right now:  6-7    Past Medical History:   Diagnosis Date     Diabetes mellitus, type 2 (H)      High cholesterol      Hypertension        Past Medical History reviewed with patient during visit.    Past Surgical History:   Procedure Laterality Date     GALLBLADDER SURGERY       Past Surgical History reviewed with patient during visit.    Current Outpatient Medications   Medication     diazepam (VALIUM) 5 MG tablet     albuterol (PROAIR HFA/PROVENTIL HFA/VENTOLIN HFA) 108 (90 Base) MCG/ACT inhaler     alcohol swab prep pads     alcohol swab prep pads     amLODIPine (NORVASC) 10 MG tablet     atorvastatin (LIPITOR) 40 MG tablet     blood glucose (NO BRAND SPECIFIED) test strip     blood glucose (NO BRAND SPECIFIED) test strip     blood glucose calibration (NO BRAND SPECIFIED) solution     blood glucose calibration (NO BRAND SPECIFIED) solution     blood glucose monitoring  (NO BRAND SPECIFIED) meter device kit     blood glucose monitoring (ONE TOUCH DELICA) lancets     cyclobenzaprine (FLEXERIL) 5 MG tablet     fluticasone (FLONASE) 50 MCG/ACT nasal spray     lisinopril (ZESTRIL) 20 MG tablet     metFORMIN (GLUCOPHAGE-XR) 500 MG 24 hr tablet     methocarbamol (ROBAXIN) 750 MG tablet     order for DME     thin (NO BRAND SPECIFIED) lancets     thin (NO BRAND SPECIFIED) lancets     UNKNOWN TO PATIENT     No current facility-administered medications for this visit.       Allergies   Allergen Reactions     Seasonal Allergies Other (See Comments) and Headache     whoozy       Social History     Socioeconomic History     Marital status: Legally      Spouse name: Not on file     Number of children: Not on file     Years of education: Not on file     Highest education level: Not on file   Occupational History     Not on file   Tobacco Use     Smoking status: Never Smoker     Smokeless tobacco: Never Used   Substance and Sexual Activity     Alcohol use: No     Drug use: No     Sexual activity: Yes     Partners: Female   Other Topics Concern     Not on file   Social History Narrative     Not on file     Social Determinants of Health     Financial Resource Strain: Not on file   Food Insecurity: Not on file   Transportation Needs: Not on file   Physical Activity: Not on file   Stress: Not on file   Social Connections: Not on file   Intimate Partner Violence: Not on file   Housing Stability: Not on file       Family History   Problem Relation Age of Onset     Diabetes Mother      Eye Surgery Mother         cataracts     Heart Disease Father         age 36 MI .      Diabetes Brother      Diabetes Sister      Diabetes Sister      Diabetes Brother      Macular Degeneration No family hx of          ROS: 10 point ROS neg other than the symptoms noted above in the HPI.    Vital Signs:   /83   Pulse 89   SpO2 99%       Examination:  Constitutional:  Alert, well nourished,  NAD.  Memory: recent and remote memory   HEENT: Normocephalic, atraumatic.   Pulm:  Without shortness of breath   CV:  No pitting edema of BLE.      Neurological:  Awake  Alert  Oriented x 3  Speech clear  Tongue midline    Motor exam:  Shoulder Abduction:  Right:  5/5   Left:  5/5  Biceps:                      Right:  5/5   Left:  5/5  Triceps:                     Right:  5/5   Left:  5/5  Wrist Extensors:       Right:  5/5   Left:  5/5  Wrist Flexors:           Right:  5/5   Left:  5/5  Intrinsics:                   Right:  5/5   Left:  5/5    Sensation normal to bilateral upper and lower extremities  Muscle tone to bilateral upper and lower extremities   Gait: Able to stand from a seated position. Normal non-antalgic, non-myelopathic gait.  Able to heel/toe walk without loss of balance    Cervical examination reveals good range of motion.  Pain on the left side of the neck with ROM. Mild tenderness to palpation of the cervical spine and paraspinous muscles bilaterally.     Imaging:   Cervical XR 11/11/2021  IMPRESSION: There is normal alignment of the cervical vertebrae; however, there is straightening of normal cervical lordosis. Vertebral body heights of the cervical spine are normal. Craniocervical alignment is normal. There is no evidence for fracture of the cervical spine. Anterior osteophytic spur formation at the C4-C5 and C5-C6 levels.    Assessment/Plan:   Cervical radiculopathy  Due to worsening symptoms despite conservative management, would recommend cervical MRI at this time. He requires oral sedation for the MRI. When the MRI is complete, I will contact him with the results and further recommendations. He verbalized understanding and agreement.    Patient Instructions   -Cervical MRI ordered. Please contact [phone] to schedule. I will contact you with the results and further recommendations.  -Pre medication sent to your pharmacy. Please take as directed.  -Continue physical therapy exercises  if you find them helpful.  -Please contact our clinic with questions or concerns at [phone].      Preeti Cid, JOHANNA  [address] [address]  Tel [phone]  Fax [phone]        Again, thank you for allowing me to participate in the care of your patient.        Sincerely,        Preeti Cid, NP

## 2022-01-18 NOTE — PROGRESS NOTES
[facility] Neurosurgery  Neurosurgery Clinic Visit      CC: neck and shoulder pain    Primary care Provider: Mohit Randall    Reason For Visit:   I was asked by Mohit Randall PA-C to consult on the patient for neck pain.    HPI: Gregg Lua is a 56 year old male with a 1 year history of left-sided neck pain. He presents for worsening symptoms. He describes left>right neck pain that radiates to the left shoulder. He denies radicular arm pain, paresthesias and weakness. No bowel/bladder changes or foot drop. He has intact ROM of his neck. He denies alleviating factors. He reports lying on his right side and turning over as an aggravating factor. He describes the pain as a pulsating pain. He has undergone PT and medication management in the past without much benefit.     Pain right now:  6-7    Past Medical History:   Diagnosis Date     Diabetes mellitus, type 2 (H)      High cholesterol      Hypertension        Past Medical History reviewed with patient during visit.    Past Surgical History:   Procedure Laterality Date     GALLBLADDER SURGERY       Past Surgical History reviewed with patient during visit.    Current Outpatient Medications   Medication     diazepam (VALIUM) 5 MG tablet     albuterol (PROAIR HFA/PROVENTIL HFA/VENTOLIN HFA) 108 (90 Base) MCG/ACT inhaler     alcohol swab prep pads     alcohol swab prep pads     amLODIPine (NORVASC) 10 MG tablet     atorvastatin (LIPITOR) 40 MG tablet     blood glucose (NO BRAND SPECIFIED) test strip     blood glucose (NO BRAND SPECIFIED) test strip     blood glucose calibration (NO BRAND SPECIFIED) solution     blood glucose calibration (NO BRAND SPECIFIED) solution     blood glucose monitoring (NO BRAND SPECIFIED) meter device kit     blood glucose monitoring (ONE TOUCH DELICA) lancets     cyclobenzaprine (FLEXERIL) 5 MG tablet     fluticasone (FLONASE) 50 MCG/ACT nasal spray     lisinopril (ZESTRIL) 20 MG tablet     metFORMIN (GLUCOPHAGE-XR) 500 MG 24  hr tablet     methocarbamol (ROBAXIN) 750 MG tablet     order for DME     thin (NO BRAND SPECIFIED) lancets     thin (NO BRAND SPECIFIED) lancets     UNKNOWN TO PATIENT     No current facility-administered medications for this visit.       Allergies   Allergen Reactions     Seasonal Allergies Other (See Comments) and Headache     whoozy       Social History     Socioeconomic History     Marital status: Legally      Spouse name: Not on file     Number of children: Not on file     Years of education: Not on file     Highest education level: Not on file   Occupational History     Not on file   Tobacco Use     Smoking status: Never Smoker     Smokeless tobacco: Never Used   Substance and Sexual Activity     Alcohol use: No     Drug use: No     Sexual activity: Yes     Partners: Female   Other Topics Concern     Not on file   Social History Narrative     Not on file     Social Determinants of Health     Financial Resource Strain: Not on file   Food Insecurity: Not on file   Transportation Needs: Not on file   Physical Activity: Not on file   Stress: Not on file   Social Connections: Not on file   Intimate Partner Violence: Not on file   Housing Stability: Not on file       Family History   Problem Relation Age of Onset     Diabetes Mother      Eye Surgery Mother         cataracts     Heart Disease Father         age 36 MI .      Diabetes Brother      Diabetes Sister      Diabetes Sister      Diabetes Brother      Macular Degeneration No family hx of          ROS: 10 point ROS neg other than the symptoms noted above in the HPI.    Vital Signs:   /83   Pulse 89   SpO2 99%       Examination:  Constitutional:  Alert, well nourished, NAD.  Memory: recent and remote memory   HEENT: Normocephalic, atraumatic.   Pulm:  Without shortness of breath   CV:  No pitting edema of BLE.      Neurological:  Awake  Alert  Oriented x 3  Speech clear  Tongue midline    Motor exam:  Shoulder Abduction:  Right:  5/5    Left:  5/5  Biceps:                      Right:  5/5   Left:  5/5  Triceps:                     Right:  5/5   Left:  5/5  Wrist Extensors:       Right:  5/5   Left:  5/5  Wrist Flexors:           Right:  5/5   Left:  5/5  Intrinsics:                   Right:  5/5   Left:  5/5    Sensation normal to bilateral upper and lower extremities  Muscle tone to bilateral upper and lower extremities   Gait: Able to stand from a seated position. Normal non-antalgic, non-myelopathic gait.  Able to heel/toe walk without loss of balance    Cervical examination reveals good range of motion.  Pain on the left side of the neck with ROM. Mild tenderness to palpation of the cervical spine and paraspinous muscles bilaterally.     Imaging:   Cervical XR 11/11/2021  IMPRESSION: There is normal alignment of the cervical vertebrae; however, there is straightening of normal cervical lordosis. Vertebral body heights of the cervical spine are normal. Craniocervical alignment is normal. There is no evidence for fracture of the cervical spine. Anterior osteophytic spur formation at the C4-C5 and C5-C6 levels.    Assessment/Plan:   Cervical radiculopathy  Due to worsening symptoms despite conservative management, would recommend cervical MRI at this time. He requires oral sedation for the MRI. When the MRI is complete, I will contact him with the results and further recommendations. He verbalized understanding and agreement.    Patient Instructions   -Cervical MRI ordered. Please contact [phone] to schedule. I will contact you with the results and further recommendations.  -Pre medication sent to your pharmacy. Please take as directed.  -Continue physical therapy exercises if you find them helpful.  -Please contact our clinic with questions or concerns at [phone].      Preeti Cid, [facility] Neurosurgery  [address] [place] [id number]  Tel [phone]  Fax [phone]

## 2022-01-18 NOTE — NURSING NOTE
"Gregg Lua is a 56 year old male who presents for:  Chief Complaint   Patient presents with     Neurologic Problem     Neck pain     Consult        Initial Vitals:  /83   Pulse 89   SpO2 99%  Estimated body mass index is 36.52 kg/m  as calculated from the following:    Height as of 11/11/21: 5' 8\" (1.727 m).    Weight as of 11/11/21: 240 lb 3.2 oz (109 kg).. There is no height or weight on file to calculate BSA. BP completed using cuff size: regular  Severe Pain (6)    Jorge Estes MA    "

## 2022-01-20 ENCOUNTER — ANCILLARY PROCEDURE (OUTPATIENT)
Dept: MRI IMAGING | Facility: CLINIC | Age: 57
End: 2022-01-20
Attending: NURSE PRACTITIONER
Payer: COMMERCIAL

## 2022-01-20 DIAGNOSIS — M54.12 CERVICAL RADICULOPATHY: ICD-10-CM

## 2022-01-20 PROCEDURE — 72141 MRI NECK SPINE W/O DYE: CPT | Mod: TC | Performed by: RADIOLOGY

## 2022-01-24 ENCOUNTER — TELEPHONE (OUTPATIENT)
Dept: NEUROSURGERY | Facility: CLINIC | Age: 57
End: 2022-01-24
Payer: COMMERCIAL

## 2022-01-24 DIAGNOSIS — M54.12 CERVICAL RADICULOPATHY: Primary | ICD-10-CM

## 2022-01-24 NOTE — TELEPHONE ENCOUNTER
Attempted to contact patient to discuss cervical MRI results. Left message to return call. Cervical MRI reveals multilevel degenerative changes and ossification of the posterior longitudinal ligament with spinal canal stenosis at L4-5 and L5-6 with neural foraminal narrowing. Would recommend continuation of PT and SYLVIA at this time. If symptoms persist or worsen, would have him follow up with Dr. Adam to discuss other options.

## 2022-01-25 NOTE — TELEPHONE ENCOUNTER
As per Preeti Cid NP:  Cervical MRI reveals multilevel degenerative changes and ossification of the posterior longitudinal ligament with spinal canal stenosis at C4-5 and C5-6 with neural foraminal narrowing. Would recommend continuation of PT and SYLVIA at this time. If symptoms persist or worsen, would have him follow up with Dr. Adam to discuss other options.     Called patient and discussed imaging results. Patient would like to proceed with both Injection and Physical therapy.   Orders placed.    Faxed injection order to [facility] January 25, 2022 to fax number [phone]    Right Fax confirmed at 1240PM    Davon Jimenes RN

## 2022-04-17 ENCOUNTER — HEALTH MAINTENANCE LETTER (OUTPATIENT)
Age: 57
End: 2022-04-17

## 2022-06-12 ENCOUNTER — HEALTH MAINTENANCE LETTER (OUTPATIENT)
Age: 57
End: 2022-06-12

## 2022-07-18 ENCOUNTER — LAB (OUTPATIENT)
Dept: URGENT CARE | Facility: URGENT CARE | Age: 57
End: 2022-07-18
Payer: COMMERCIAL

## 2022-07-18 DIAGNOSIS — Z20.822 SUSPECTED COVID-19 VIRUS INFECTION: ICD-10-CM

## 2022-07-18 PROCEDURE — U0003 INFECTIOUS AGENT DETECTION BY NUCLEIC ACID (DNA OR RNA); SEVERE ACUTE RESPIRATORY SYNDROME CORONAVIRUS 2 (SARS-COV-2) (CORONAVIRUS DISEASE [COVID-19]), AMPLIFIED PROBE TECHNIQUE, MAKING USE OF HIGH THROUGHPUT TECHNOLOGIES AS DESCRIBED BY CMS-2020-01-R: HCPCS

## 2022-07-18 PROCEDURE — U0005 INFEC AGEN DETEC AMPLI PROBE: HCPCS

## 2022-07-18 PROCEDURE — 99207 PR NO CHARGE LOS: CPT

## 2022-07-19 ENCOUNTER — TELEPHONE (OUTPATIENT)
Dept: FAMILY MEDICINE | Facility: CLINIC | Age: 57
End: 2022-07-19

## 2022-07-19 LAB — SARS-COV-2 RNA RESP QL NAA+PROBE: POSITIVE

## 2022-07-19 NOTE — TELEPHONE ENCOUNTER
Patient is asking when he can get his booster COVID vaccine as he is currently COVID positive and about 5-7 days into it.    Nursing advice:  Patient notified that he will need to be through his quarantine and feeling better to get his booster vaccine.  The patient was warm transferred to central scheduling and they were asked to assist patient in making a COVID vaccine appointment at the patient's  preferred location in at least 1 weeks. They verified they understood. I sent a [employer] message to the patient with the COVID vaccine information. Patient verbalized good understanding, agrees with plan and needs no further support.  Thank you. Laury Marion R.N.

## 2022-07-26 ENCOUNTER — ALLIED HEALTH/NURSE VISIT (OUTPATIENT)
Dept: FAMILY MEDICINE | Facility: CLINIC | Age: 57
End: 2022-07-26

## 2022-07-26 ENCOUNTER — IMMUNIZATION (OUTPATIENT)
Dept: NURSING | Facility: CLINIC | Age: 57
End: 2022-07-26
Payer: COMMERCIAL

## 2022-07-26 VITALS — HEART RATE: 78 BPM | SYSTOLIC BLOOD PRESSURE: 136 MMHG | DIASTOLIC BLOOD PRESSURE: 86 MMHG

## 2022-07-26 DIAGNOSIS — Z23 HIGH PRIORITY FOR 2019-NCOV VACCINE: Primary | ICD-10-CM

## 2022-07-26 DIAGNOSIS — Z01.30 BLOOD PRESSURE CHECK: Primary | ICD-10-CM

## 2022-07-26 PROCEDURE — 0054A COVID-19,PF,PFIZER (12+ YRS): CPT

## 2022-07-26 PROCEDURE — 91305 COVID-19,PF,PFIZER (12+ YRS): CPT

## 2022-07-26 PROCEDURE — 99207 PR DROP WITH A PROCEDURE: CPT | Mod: 25 | Performed by: PHYSICIAN ASSISTANT

## 2022-07-26 NOTE — NURSING NOTE
Information was reviewed with patient on consent form. Patient noted on consent that he had been told he had covid within the past 14 days. Patient stated that he did not currently have covid and vaccination was given. Afterwards it was noted that patient had been positive for covid on 7/18/2022 (6 days out). Spoke with Kristen Kehr, PA-C, she stated that this will not harm patient but he may potentially have more symptoms than a person without recently having covid.     Margaret Sellers, CMA

## 2022-07-26 NOTE — PROGRESS NOTES
Gregg Lua was evaluated at [facility] on July 26, 2022 at which time his blood pressure was:    BP Readings from Last 3 Encounters:   07/26/22 136/86   01/18/22 134/83   11/11/21 137/86     Pulse Readings from Last 3 Encounters:   07/26/22 78   01/18/22 89   11/11/21 97       Reviewed lifestyle modifications for blood pressure control and reduction: including making healthy food choices, managing weight, getting regular exercise, smoking cessation, reducing alcohol consumption, monitoring blood pressure regularly.     Symptoms: None    BP Goal:< 140/90 mmHg    BP Assessment:  BP at goal    Potential Reasons for BP too high: NA - Not applicable    BP Follow-Up Plan: 3 months     Recommendation to Provider: n/a    Note completed by: Xiomara Monroy, Pharm D      [facility]  [phone]

## 2022-07-26 NOTE — PROGRESS NOTES
Immunizations Administered     Name Date Dose VIS Date Route    COVID-19,PF,Pfizer 12+ Yrs (2022 and After) 7/26/22 10:48 AM 0.3 mL EUA,03/29/2022,Given today Intramuscular

## 2022-07-27 NOTE — NURSING NOTE
"Chief Complaint   Patient presents with     Lipids     Diabetes     Hypertension       Initial /86   Pulse 78   Temp 97  F (36.1  C) (Oral)   Ht 1.727 m (5' 8\")   Wt 112.5 kg (248 lb)   SpO2 100%   BMI 37.71 kg/m   Estimated body mass index is 37.71 kg/m  as calculated from the following:    Height as of this encounter: 1.727 m (5' 8\").    Weight as of this encounter: 112.5 kg (248 lb).  Medication Reconciliation: complete  Willy Houser CMA    " 15

## 2022-08-24 NOTE — PROGRESS NOTES
{PROVIDER CHARTING PREFERENCE:969839}    Subjective   Gregg is a 57 year old{ACCOMPANIED BY STATEMENT (Optional):957556}, presenting for the following health issues:  No chief complaint on file.      HPI     {SUPERLIST (Optional):327947}  {additonal problems for provider to add (Optional):645432}    Review of Systems   {ROS COMP (Optional):074831}      Objective    There were no vitals taken for this visit.  There is no height or weight on file to calculate BMI.  Physical Exam   {Exam List (Optional):280477}    {Diagnostic Test Results (Optional):553057}    {AMBULATORY ATTESTATION (Optional):389161}            .  ..

## 2022-08-25 ENCOUNTER — OFFICE VISIT (OUTPATIENT)
Dept: FAMILY MEDICINE | Facility: CLINIC | Age: 57
End: 2022-08-25
Payer: COMMERCIAL

## 2022-08-25 VITALS
WEIGHT: 242 LBS | HEART RATE: 85 BPM | DIASTOLIC BLOOD PRESSURE: 82 MMHG | TEMPERATURE: 97.6 F | BODY MASS INDEX: 36.68 KG/M2 | SYSTOLIC BLOOD PRESSURE: 129 MMHG | HEIGHT: 68 IN | OXYGEN SATURATION: 99 %

## 2022-08-25 DIAGNOSIS — E78.00 HIGH CHOLESTEROL: ICD-10-CM

## 2022-08-25 DIAGNOSIS — I10 HYPERTENSION GOAL BP (BLOOD PRESSURE) < 140/90: ICD-10-CM

## 2022-08-25 DIAGNOSIS — E11.9 TYPE 2 DIABETES MELLITUS WITHOUT COMPLICATION, WITHOUT LONG-TERM CURRENT USE OF INSULIN (H): Primary | ICD-10-CM

## 2022-08-25 LAB
ALBUMIN SERPL-MCNC: 4 G/DL (ref 3.4–5)
ALP SERPL-CCNC: 52 U/L (ref 40–150)
ALT SERPL W P-5'-P-CCNC: 14 U/L (ref 0–70)
ANION GAP SERPL CALCULATED.3IONS-SCNC: 5 MMOL/L (ref 3–14)
AST SERPL W P-5'-P-CCNC: 12 U/L (ref 0–45)
BILIRUB SERPL-MCNC: 0.3 MG/DL (ref 0.2–1.3)
BUN SERPL-MCNC: 16 MG/DL (ref 7–30)
CALCIUM SERPL-MCNC: 9.2 MG/DL (ref 8.5–10.1)
CHLORIDE BLD-SCNC: 105 MMOL/L (ref 94–109)
CHOLEST SERPL-MCNC: 166 MG/DL
CO2 SERPL-SCNC: 29 MMOL/L (ref 20–32)
CREAT SERPL-MCNC: 1.06 MG/DL (ref 0.66–1.25)
FASTING STATUS PATIENT QL REPORTED: YES
GFR SERPL CREATININE-BSD FRML MDRD: 81 ML/MIN/1.73M2
GLUCOSE BLD-MCNC: 127 MG/DL (ref 70–99)
HBA1C MFR BLD: 6.8 % (ref 0–5.6)
HDLC SERPL-MCNC: 47 MG/DL
LDLC SERPL CALC-MCNC: 88 MG/DL
NONHDLC SERPL-MCNC: 119 MG/DL
POTASSIUM BLD-SCNC: 4.4 MMOL/L (ref 3.4–5.3)
PROT SERPL-MCNC: 7.9 G/DL (ref 6.8–8.8)
SODIUM SERPL-SCNC: 139 MMOL/L (ref 133–144)
TRIGL SERPL-MCNC: 156 MG/DL

## 2022-08-25 PROCEDURE — 36415 COLL VENOUS BLD VENIPUNCTURE: CPT | Performed by: PHYSICIAN ASSISTANT

## 2022-08-25 PROCEDURE — 83036 HEMOGLOBIN GLYCOSYLATED A1C: CPT | Performed by: PHYSICIAN ASSISTANT

## 2022-08-25 PROCEDURE — 80061 LIPID PANEL: CPT | Performed by: PHYSICIAN ASSISTANT

## 2022-08-25 PROCEDURE — 80053 COMPREHEN METABOLIC PANEL: CPT | Performed by: PHYSICIAN ASSISTANT

## 2022-08-25 PROCEDURE — 99214 OFFICE O/P EST MOD 30 MIN: CPT | Performed by: PHYSICIAN ASSISTANT

## 2022-08-25 PROCEDURE — 99207 PR FOOT EXAM NO CHARGE: CPT | Performed by: PHYSICIAN ASSISTANT

## 2022-08-25 RX ORDER — ATORVASTATIN CALCIUM 40 MG/1
40 TABLET, FILM COATED ORAL DAILY
Qty: 90 TABLET | Refills: 1 | Status: SHIPPED | OUTPATIENT
Start: 2022-08-25 | End: 2023-03-02

## 2022-08-25 RX ORDER — METFORMIN HCL 500 MG
TABLET, EXTENDED RELEASE 24 HR ORAL
Qty: 360 TABLET | Refills: 1 | Status: SHIPPED | OUTPATIENT
Start: 2022-08-25 | End: 2023-01-30

## 2022-08-25 RX ORDER — AMLODIPINE BESYLATE 10 MG/1
10 TABLET ORAL DAILY
Qty: 90 TABLET | Refills: 1 | Status: SHIPPED | OUTPATIENT
Start: 2022-08-25 | End: 2023-04-28

## 2022-08-25 RX ORDER — LISINOPRIL 20 MG/1
20 TABLET ORAL DAILY
Qty: 90 TABLET | Refills: 1 | Status: SHIPPED | OUTPATIENT
Start: 2022-08-25 | End: 2023-04-28

## 2022-08-25 NOTE — PROGRESS NOTES
"  Assessment & Plan       ICD-10-CM    1. Type 2 diabetes mellitus without complication, without long-term current use of insulin (H)  E11.9 HEMOGLOBIN A1C     Comprehensive metabolic panel (BMP + Alb, Alk Phos, ALT, AST, Total. Bili, TP)     atorvastatin (LIPITOR) 40 MG tablet     metFORMIN (GLUCOPHAGE XR) 500 MG 24 hr tablet     FOOT EXAM     HEMOGLOBIN A1C     Comprehensive metabolic panel (BMP + Alb, Alk Phos, ALT, AST, Total. Bili, TP)   2. Hypertension goal BP (blood pressure) < 140/90  I10 Comprehensive metabolic panel (BMP + Alb, Alk Phos, ALT, AST, Total. Bili, TP)     amLODIPine (NORVASC) 10 MG tablet     lisinopril (ZESTRIL) 20 MG tablet     Comprehensive metabolic panel (BMP + Alb, Alk Phos, ALT, AST, Total. Bili, TP)   3. High cholesterol  E78.00 Lipid panel reflex to direct LDL Non-fasting     atorvastatin (LIPITOR) 40 MG tablet     Lipid panel reflex to direct LDL Non-fasting   medical conditions are stable. meds refilled.  Work on Healthy diet and exercise. Getting heart rate elevated for 30 mins most days of week.  Labs pending.      BMI:   Estimated body mass index is 36.8 kg/m  as calculated from the following:    Height as of this encounter: 1.727 m (5' 8\").    Weight as of this encounter: 109.8 kg (242 lb).   Weight management plan: Discussed healthy diet and exercise guidelines    Return in about 6 months (around 2/25/2023) for recheck.    DOREEN [facility]   Gregg is a 57 year old, presenting for the following health issues:  Diabetes      History of Present Illness       Diabetes:   He presents for follow up of diabetes.  He is checking home blood glucose a few times a month. He checks blood glucose after meals.  Blood glucose is never over 200 and never under 70. He is aware of hypoglycemia symptoms including shakiness. He has no concerns regarding his diabetes at this time.  He is not experiencing numbness or burning in feet, excessive " "thirst, blurry vision, weight changes or redness, sores or blisters on feet. The patient has had a diabetic eye exam in the last 12 months. Eye exam performed on Not sure. Location of last eye exam Here.        Hypertension: He presents for follow up of hypertension.  He does check blood pressure  regularly outside of the clinic. Outside blood pressures have been over 140/90. He follows a low salt diet.         Diabetes Follow-up      How often are you checking your blood sugar? Not at all    What concerns do you have today about your diabetes? None     Do you have any of these symptoms? (Select all that apply)  No numbness or tingling in feet.  No redness, sores or blisters on feet.  No complaints of excessive thirst.  No reports of blurry vision.  No significant changes to weight.    Have you had a diabetic eye exam in the last 12 months? No          Hyperlipidemia Follow-Up      Are you regularly taking any medication or supplement to lower your cholesterol?   Yes- lipitor    Are you having muscle aches or other side effects that you think could be caused by your cholesterol lowering medication?  No    Hypertension Follow-up      Do you check your blood pressure regularly outside of the clinic? No     Are you following a low salt diet? Yes    Are your blood pressures ever more than 140 on the top number (systolic) OR more   than 90 on the bottom number (diastolic), for example 140/90? No    BP Readings from Last 2 Encounters:   08/25/22 129/82   07/26/22 136/86     Hemoglobin A1C (%)   Date Value   11/11/2021 6.0 (H)   06/29/2021 6.6 (H)   09/03/2020 6.3 (H)     LDL Cholesterol Calculated (mg/dL)   Date Value   06/29/2021 81   09/03/2020 28         Review of Systems   Constitutional, HEENT, cardiovascular, pulmonary, gi and gu systems are negative, except as otherwise noted.      Objective    /82   Pulse 85   Temp 97.6  F (36.4  C)   Ht 1.727 m (5' 8\")   Wt 109.8 kg (242 lb)   SpO2 99%   BMI 36.80 " kg/m    Body mass index is 36.8 kg/m .  Physical Exam   GENERAL: healthy, alert and no distress  EYES: Eyes grossly normal to inspection, PERRL and conjunctivae and sclerae normal  HENT: ear canals and TM's normal, nose and mouth without ulcers or lesions  NECK: no adenopathy, no asymmetry, masses, or scars and thyroid normal to palpation  RESP: lungs clear to auscultation - no rales, rhonchi or wheezes  CV: regular rate and rhythm, normal S1 S2, no S3 or S4, no murmur, click or rub, no peripheral edema and peripheral pulses strong  ABDOMEN: soft, nontender, no hepatosplenomegaly, no masses and bowel sounds normal  MS: no gross musculoskeletal defects noted, no edema  SKIN: no suspicious lesions or rashes  NEURO: Normal strength and tone, mentation intact and speech normal  PSYCH: mentation appears normal, affect normal/bright  Diabetic foot exam: normal DP and PT pulses, no trophic changes or ulcerative lesions, normal sensory exam and normal monofilament exam    Labs pending

## 2022-10-23 ENCOUNTER — HEALTH MAINTENANCE LETTER (OUTPATIENT)
Age: 57
End: 2022-10-23

## 2023-01-10 ENCOUNTER — ALLIED HEALTH/NURSE VISIT (OUTPATIENT)
Dept: FAMILY MEDICINE | Facility: CLINIC | Age: 58
End: 2023-01-10

## 2023-01-10 DIAGNOSIS — Z23 HIGH PRIORITY FOR 2019-NCOV VACCINE: Primary | ICD-10-CM

## 2023-01-10 PROCEDURE — 91312 COVID-19 VACCINE BIVALENT BOOSTER 12+ (PFIZER): CPT

## 2023-01-10 PROCEDURE — 99207 PR NO CHARGE NURSE ONLY: CPT

## 2023-01-10 PROCEDURE — 0124A COVID-19 VACCINE BIVALENT BOOSTER 12+ (PFIZER): CPT

## 2023-03-01 DIAGNOSIS — E78.00 HIGH CHOLESTEROL: ICD-10-CM

## 2023-03-01 DIAGNOSIS — E11.9 TYPE 2 DIABETES MELLITUS WITHOUT COMPLICATION, WITHOUT LONG-TERM CURRENT USE OF INSULIN (H): ICD-10-CM

## 2023-03-02 RX ORDER — ATORVASTATIN CALCIUM 40 MG/1
TABLET, FILM COATED ORAL
Qty: 90 TABLET | Refills: 1 | Status: SHIPPED | OUTPATIENT
Start: 2023-03-02 | End: 2023-09-01

## 2023-04-01 ENCOUNTER — HEALTH MAINTENANCE LETTER (OUTPATIENT)
Age: 58
End: 2023-04-01

## 2023-04-28 DIAGNOSIS — I10 HYPERTENSION GOAL BP (BLOOD PRESSURE) < 140/90: ICD-10-CM

## 2023-04-28 DIAGNOSIS — E11.9 TYPE 2 DIABETES MELLITUS WITHOUT COMPLICATION, WITHOUT LONG-TERM CURRENT USE OF INSULIN (H): ICD-10-CM

## 2023-04-28 RX ORDER — METFORMIN HCL 500 MG
TABLET, EXTENDED RELEASE 24 HR ORAL
Qty: 360 TABLET | Refills: 0 | OUTPATIENT
Start: 2023-04-28

## 2023-04-28 RX ORDER — AMLODIPINE BESYLATE 10 MG/1
TABLET ORAL
Qty: 90 TABLET | Refills: 1 | Status: SHIPPED | OUTPATIENT
Start: 2023-04-28 | End: 2023-09-01

## 2023-04-28 RX ORDER — LISINOPRIL 20 MG/1
TABLET ORAL
Qty: 90 TABLET | Refills: 1 | Status: SHIPPED | OUTPATIENT
Start: 2023-04-28 | End: 2023-10-25

## 2023-05-13 ENCOUNTER — TELEPHONE (OUTPATIENT)
Dept: FAMILY MEDICINE | Facility: CLINIC | Age: 58
End: 2023-05-13

## 2023-05-13 NOTE — TELEPHONE ENCOUNTER
Medication Question or Refill    Contacts       Type Contact Phone/Fax    05/13/2023 03:27 PM CDT Phone (Incoming) Gregg Lua (Self) [phone] (M)          What medication are you calling about (include dose and sig)?: all the medication       Preferred Pharmacy:[id number] [place] - [address] [address]  [address] [address] [address]  Phone: [phone] Fax: [phone]      Controlled Substance Agreement on file:   CSA -- Patient Level:    CSA: None found at the patient level.       Who prescribed the medication?:pcp  Do you need a refill? Yes    When did you use the medication last? 5/13/2023    Patient offered an appointment? Yes: 5/25/2023    Do you have any questions or concerns?  No      Could we send this information to you in [facility] or would you prefer to receive a phone call?:   Patient would prefer a phone call   Okay to leave a detailed message?: Yes at Home number on file [phone] (home)

## 2023-05-15 NOTE — TELEPHONE ENCOUNTER
Left message on pt vm that he should contact his pharmacy for all his refills. It looks like they have refills of most of his medicines available already.  If they do not they will send us a refill request electronically.   Advised we need the exact names of medications to do refills.   Erendira MCRAEN, RN

## 2023-06-01 ENCOUNTER — HEALTH MAINTENANCE LETTER (OUTPATIENT)
Age: 58
End: 2023-06-01

## 2023-06-06 ENCOUNTER — OFFICE VISIT (OUTPATIENT)
Dept: FAMILY MEDICINE | Facility: CLINIC | Age: 58
End: 2023-06-06
Payer: COMMERCIAL

## 2023-06-06 VITALS
SYSTOLIC BLOOD PRESSURE: 139 MMHG | HEART RATE: 88 BPM | RESPIRATION RATE: 16 BRPM | OXYGEN SATURATION: 98 % | DIASTOLIC BLOOD PRESSURE: 82 MMHG | TEMPERATURE: 96 F | BODY MASS INDEX: 36.98 KG/M2 | WEIGHT: 244 LBS | HEIGHT: 68 IN

## 2023-06-06 DIAGNOSIS — I10 HYPERTENSION GOAL BP (BLOOD PRESSURE) < 140/90: Chronic | ICD-10-CM

## 2023-06-06 DIAGNOSIS — E11.9 TYPE 2 DIABETES MELLITUS WITHOUT COMPLICATION, WITHOUT LONG-TERM CURRENT USE OF INSULIN (H): Primary | ICD-10-CM

## 2023-06-06 DIAGNOSIS — E78.00 HIGH CHOLESTEROL: ICD-10-CM

## 2023-06-06 DIAGNOSIS — M54.50 BILATERAL LOW BACK PAIN WITHOUT SCIATICA, UNSPECIFIED CHRONICITY: ICD-10-CM

## 2023-06-06 LAB
ALBUMIN SERPL BCG-MCNC: 4.2 G/DL (ref 3.5–5.2)
ALP SERPL-CCNC: 54 U/L (ref 40–129)
ALT SERPL W P-5'-P-CCNC: 7 U/L (ref 10–50)
ANION GAP SERPL CALCULATED.3IONS-SCNC: 10 MMOL/L (ref 7–15)
AST SERPL W P-5'-P-CCNC: 19 U/L (ref 10–50)
BILIRUB SERPL-MCNC: 0.2 MG/DL
BUN SERPL-MCNC: 11.8 MG/DL (ref 6–20)
CALCIUM SERPL-MCNC: 9.6 MG/DL (ref 8.6–10)
CHLORIDE SERPL-SCNC: 107 MMOL/L (ref 98–107)
CHOLEST SERPL-MCNC: 92 MG/DL
CREAT SERPL-MCNC: 1.06 MG/DL (ref 0.67–1.17)
CREAT UR-MCNC: 234 MG/DL
DEPRECATED HCO3 PLAS-SCNC: 26 MMOL/L (ref 22–29)
GFR SERPL CREATININE-BSD FRML MDRD: 81 ML/MIN/1.73M2
GLUCOSE SERPL-MCNC: 127 MG/DL (ref 70–99)
HBA1C MFR BLD: 7.4 % (ref 0–5.6)
HDLC SERPL-MCNC: 36 MG/DL
LDLC SERPL CALC-MCNC: 30 MG/DL
MICROALBUMIN UR-MCNC: <12 MG/L
MICROALBUMIN/CREAT UR: NORMAL MG/G{CREAT}
NONHDLC SERPL-MCNC: 56 MG/DL
POTASSIUM SERPL-SCNC: 4.3 MMOL/L (ref 3.4–5.3)
PROT SERPL-MCNC: 7.2 G/DL (ref 6.4–8.3)
SODIUM SERPL-SCNC: 143 MMOL/L (ref 136–145)
TRIGL SERPL-MCNC: 130 MG/DL

## 2023-06-06 PROCEDURE — 80061 LIPID PANEL: CPT | Performed by: PHYSICIAN ASSISTANT

## 2023-06-06 PROCEDURE — 99214 OFFICE O/P EST MOD 30 MIN: CPT | Mod: 25 | Performed by: PHYSICIAN ASSISTANT

## 2023-06-06 PROCEDURE — 90471 IMMUNIZATION ADMIN: CPT | Performed by: PHYSICIAN ASSISTANT

## 2023-06-06 PROCEDURE — 82570 ASSAY OF URINE CREATININE: CPT | Performed by: PHYSICIAN ASSISTANT

## 2023-06-06 PROCEDURE — 83036 HEMOGLOBIN GLYCOSYLATED A1C: CPT | Performed by: PHYSICIAN ASSISTANT

## 2023-06-06 PROCEDURE — 80053 COMPREHEN METABOLIC PANEL: CPT | Performed by: PHYSICIAN ASSISTANT

## 2023-06-06 PROCEDURE — 36415 COLL VENOUS BLD VENIPUNCTURE: CPT | Performed by: PHYSICIAN ASSISTANT

## 2023-06-06 PROCEDURE — 82043 UR ALBUMIN QUANTITATIVE: CPT | Performed by: PHYSICIAN ASSISTANT

## 2023-06-06 PROCEDURE — 90715 TDAP VACCINE 7 YRS/> IM: CPT | Performed by: PHYSICIAN ASSISTANT

## 2023-06-06 RX ORDER — METFORMIN HCL 500 MG
2000 TABLET, EXTENDED RELEASE 24 HR ORAL
Qty: 360 TABLET | Refills: 0 | Status: SHIPPED | OUTPATIENT
Start: 2023-06-06 | End: 2023-09-07

## 2023-06-06 ASSESSMENT — PAIN SCALES - GENERAL: PAINLEVEL: MILD PAIN (3)

## 2023-06-06 NOTE — PROGRESS NOTES
"  Assessment & Plan       ICD-10-CM    1. Type 2 diabetes mellitus without complication, without long-term current use of insulin (H)  E11.9 Albumin Random Urine Quantitative with Creat Ratio     HEMOGLOBIN A1C     Lipid panel reflex to direct LDL Fasting     Comprehensive metabolic panel (BMP + Alb, Alk Phos, ALT, AST, Total. Bili, TP)     metFORMIN (GLUCOPHAGE XR) 500 MG 24 hr tablet     Albumin Random Urine Quantitative with Creat Ratio     HEMOGLOBIN A1C     Lipid panel reflex to direct LDL Fasting     Comprehensive metabolic panel (BMP + Alb, Alk Phos, ALT, AST, Total. Bili, TP)      2. High cholesterol  E78.00       3. Hypertension goal BP (blood pressure) < 140/90  I10       4. Bilateral low back pain without sciatica, unspecified chronicity  M54.50 Physical Therapy Referral      1-3. medical conditions are stable. meds refilled.  4. Recommend PHYSICAL THERAPY suspect musculoskeletal.  We discussed activity modification ice heat Tylenol as needed.  We talked about getting up and moving around with long car rides.  He can consider doing a lumbar support for his chair.  Recheck 4 to 6 weeks as needed.        BMI:   Estimated body mass index is 37.1 kg/m  as calculated from the following:    Height as of this encounter: 1.727 m (5' 8\").    Weight as of this encounter: 110.7 kg (244 lb).   Weight management plan: Discussed healthy diet and exercise guidelines      Mohit Randall PA-C  [facility]   Gregg is a 57 year old, presenting for the following health issues:  Recheck Medication        6/6/2023     9:40 AM   Additional Questions   Roomed by amber   Accompanied by self         6/6/2023     9:40 AM   Patient Reported Additional Medications   Patient reports taking the following new medications no new meds     History of Present Illness       Back Pain:  He presents for follow up of back pain. Patient's back pain is a chronic problem.  Location of back pain:  Right " lower back and left lower back  Description of back pain: dull ache  Back pain spreads: nowhere    Since patient first noticed back pain, pain is: gradually worsening  Does back pain interfere with his job:  Not applicable      He eats 2-3 servings of fruits and vegetables daily.He consumes 1 sweetened beverage(s) daily.He exercises with enough effort to increase his heart rate 30 to 60 minutes per day.  He exercises with enough effort to increase his heart rate 4 days per week.   He is taking medications regularly.     Lower right back stiffness off and on for a few months.  Has had back problems in the past.  Denies any numbness tingling.  Worse with long car rides.  States he gets stiff and sore.  Treatment he is done a couple chiropractic treatments in the past.    Diabetes Follow-up    How often are you checking your blood sugar? One time daily  What time of day are you checking your blood sugars (select all that apply)?  Before meals  Have you had any blood sugars above 200?  No  Have you had any blood sugars below 70?  No    What symptoms do you notice when your blood sugar is low?  None    What concerns do you have today about your diabetes? None     Do you have any of these symptoms? (Select all that apply)  No numbness or tingling in feet.  No redness, sores or blisters on feet.  No complaints of excessive thirst.  No reports of blurry vision.  No significant changes to weight.    Have you had a diabetic eye exam in the last 12 months? No            Hyperlipidemia Follow-Up      Are you regularly taking any medication or supplement to lower your cholesterol?   Yes- lipitor    Are you having muscle aches or other side effects that you think could be caused by your cholesterol lowering medication?  No    Hypertension Follow-up      Do you check your blood pressure regularly outside of the clinic? Yes     Are you following a low salt diet? Yes    Are your blood pressures ever more than 140 on the top number  "(systolic) OR more   than 90 on the bottom number (diastolic), for example 140/90? No    BP Readings from Last 2 Encounters:   06/06/23 139/82   08/25/22 129/82     Hemoglobin A1C (%)   Date Value   06/06/2023 7.4 (H)   08/25/2022 6.8 (H)   06/29/2021 6.6 (H)   09/03/2020 6.3 (H)     LDL Cholesterol Calculated (mg/dL)   Date Value   08/25/2022 88   06/29/2021 81   09/03/2020 28       Review of Systems   Constitutional, HEENT, cardiovascular, pulmonary, gi and gu systems are negative, except as otherwise noted.      Objective    /82   Pulse 88   Temp (!) 96  F (35.6  C) (Tympanic)   Resp 16   Ht 1.727 m (5' 8\")   Wt 110.7 kg (244 lb)   SpO2 98%   BMI 37.10 kg/m    Body mass index is 37.1 kg/m .  Physical Exam   GENERAL: healthy, alert and no distress  EYES: Eyes grossly normal to inspection, PERRL and conjunctivae and sclerae normal  HENT: ear canals and TM's normal, nose and mouth without ulcers or lesions  NECK: no adenopathy, no asymmetry, masses, or scars and thyroid normal to palpation  RESP: lungs clear to auscultation - no rales, rhonchi or wheezes  CV: regular rate and rhythm, normal S1 S2, no S3 or S4, no murmur, click or rub, no peripheral edema and peripheral pulses strong  ABDOMEN: soft, nontender, no hepatosplenomegaly, no masses and bowel sounds normal  MS: no gross musculoskeletal defects noted, no edema  SKIN: no suspicious lesions or rashes  NEURO: Normal strength and tone, mentation intact and speech normal  PSYCH: mentation appears normal, affect normal/bright  Back: Mild diffuse lower lumbar paraspinal muscular tenderness.  He is full range of motion.  He is 5-5 strength lower extremities with negative straight leg raise.  His calves are soft nontender is neuro vas intact distally.    Labs pending              "

## 2023-06-07 NOTE — RESULT ENCOUNTER NOTE
Mr. Lua,    All of your labs were normal/near normal for you.  Work on Healthy diet and exercise. Getting heart rate elevated for 30 mins most days of week.    Please contact the clinic if you have additional questions.  Thank you.    Sincerely,    Mohit Randall PA-C

## 2023-06-11 ENCOUNTER — MYC MEDICAL ADVICE (OUTPATIENT)
Dept: FAMILY MEDICINE | Facility: CLINIC | Age: 58
End: 2023-06-11
Payer: COMMERCIAL

## 2023-06-12 NOTE — TELEPHONE ENCOUNTER
Routing to provider to review and advise, see [facility] message below. Patient sent in response to lab results message from 6/6/23 visit. Patient asking about ALT results, specifically.

## 2023-07-27 DIAGNOSIS — E11.9 TYPE 2 DIABETES MELLITUS WITHOUT COMPLICATION, WITHOUT LONG-TERM CURRENT USE OF INSULIN (H): ICD-10-CM

## 2023-07-27 RX ORDER — METFORMIN HCL 500 MG
TABLET, EXTENDED RELEASE 24 HR ORAL
Qty: 360 TABLET | Refills: 0 | OUTPATIENT
Start: 2023-07-27

## 2023-08-28 ENCOUNTER — ALLIED HEALTH/NURSE VISIT (OUTPATIENT)
Dept: FAMILY MEDICINE | Facility: CLINIC | Age: 58
End: 2023-08-28
Payer: COMMERCIAL

## 2023-08-28 VITALS — SYSTOLIC BLOOD PRESSURE: 132 MMHG | DIASTOLIC BLOOD PRESSURE: 70 MMHG | HEART RATE: 69 BPM

## 2023-08-28 DIAGNOSIS — Z01.30 BLOOD PRESSURE CHECK: Primary | ICD-10-CM

## 2023-08-28 PROCEDURE — 99207 PR NO CHARGE NURSE ONLY: CPT | Performed by: PHYSICIAN ASSISTANT

## 2023-09-01 DIAGNOSIS — I10 HYPERTENSION GOAL BP (BLOOD PRESSURE) < 140/90: ICD-10-CM

## 2023-09-01 DIAGNOSIS — E11.9 TYPE 2 DIABETES MELLITUS WITHOUT COMPLICATION, WITHOUT LONG-TERM CURRENT USE OF INSULIN (H): ICD-10-CM

## 2023-09-01 DIAGNOSIS — E78.00 HIGH CHOLESTEROL: ICD-10-CM

## 2023-09-01 RX ORDER — AMLODIPINE BESYLATE 10 MG/1
TABLET ORAL
Qty: 90 TABLET | Refills: 1 | Status: SHIPPED | OUTPATIENT
Start: 2023-09-01 | End: 2024-04-18 | Stop reason: ALTCHOICE

## 2023-09-01 RX ORDER — ATORVASTATIN CALCIUM 40 MG/1
TABLET, FILM COATED ORAL
Qty: 90 TABLET | Refills: 1 | Status: SHIPPED | OUTPATIENT
Start: 2023-09-01 | End: 2024-01-02

## 2023-09-06 DIAGNOSIS — E11.9 TYPE 2 DIABETES MELLITUS WITHOUT COMPLICATION, WITHOUT LONG-TERM CURRENT USE OF INSULIN (H): ICD-10-CM

## 2023-09-07 RX ORDER — METFORMIN HCL 500 MG
2000 TABLET, EXTENDED RELEASE 24 HR ORAL
Qty: 360 TABLET | Refills: 0 | Status: SHIPPED | OUTPATIENT
Start: 2023-09-07 | End: 2023-12-08

## 2023-10-25 DIAGNOSIS — I10 HYPERTENSION GOAL BP (BLOOD PRESSURE) < 140/90: ICD-10-CM

## 2023-10-25 DIAGNOSIS — E11.9 TYPE 2 DIABETES MELLITUS WITHOUT COMPLICATION, WITHOUT LONG-TERM CURRENT USE OF INSULIN (H): ICD-10-CM

## 2023-10-25 RX ORDER — METFORMIN HCL 500 MG
2000 TABLET, EXTENDED RELEASE 24 HR ORAL
Qty: 360 TABLET | Refills: 0 | OUTPATIENT
Start: 2023-10-25

## 2023-10-25 RX ORDER — LISINOPRIL 20 MG/1
TABLET ORAL
Qty: 90 TABLET | Refills: 1 | Status: SHIPPED | OUTPATIENT
Start: 2023-10-25 | End: 2024-03-22

## 2023-12-08 DIAGNOSIS — E11.9 TYPE 2 DIABETES MELLITUS WITHOUT COMPLICATION, WITHOUT LONG-TERM CURRENT USE OF INSULIN (H): ICD-10-CM

## 2023-12-08 RX ORDER — METFORMIN HCL 500 MG
2000 TABLET, EXTENDED RELEASE 24 HR ORAL
Qty: 360 TABLET | Refills: 0 | Status: SHIPPED | OUTPATIENT
Start: 2023-12-08 | End: 2024-01-12

## 2023-12-08 NOTE — TELEPHONE ENCOUNTER
Patient requesting refill on Rx metFORMIN (GLUCOPHAGE XR) 500 MG 24 hr tablet    Med and pharm pended in the chart.    Landy Reeves,    [facility]

## 2023-12-29 ENCOUNTER — OFFICE VISIT (OUTPATIENT)
Dept: FAMILY MEDICINE | Facility: CLINIC | Age: 58
End: 2023-12-29
Payer: COMMERCIAL

## 2023-12-29 VITALS
OXYGEN SATURATION: 100 % | HEART RATE: 91 BPM | HEIGHT: 68 IN | WEIGHT: 230 LBS | TEMPERATURE: 96.2 F | SYSTOLIC BLOOD PRESSURE: 128 MMHG | RESPIRATION RATE: 18 BRPM | DIASTOLIC BLOOD PRESSURE: 80 MMHG | BODY MASS INDEX: 34.86 KG/M2

## 2023-12-29 DIAGNOSIS — L29.9 ITCHING: ICD-10-CM

## 2023-12-29 DIAGNOSIS — I10 HYPERTENSION GOAL BP (BLOOD PRESSURE) < 140/90: Chronic | ICD-10-CM

## 2023-12-29 DIAGNOSIS — E78.00 HIGH CHOLESTEROL: ICD-10-CM

## 2023-12-29 DIAGNOSIS — E11.9 TYPE 2 DIABETES MELLITUS WITHOUT COMPLICATION, WITHOUT LONG-TERM CURRENT USE OF INSULIN (H): Primary | ICD-10-CM

## 2023-12-29 DIAGNOSIS — M54.2 NECK PAIN: ICD-10-CM

## 2023-12-29 DIAGNOSIS — R42 VERTIGO: ICD-10-CM

## 2023-12-29 LAB
ALBUMIN SERPL BCG-MCNC: 4.2 G/DL (ref 3.5–5.2)
ALP SERPL-CCNC: 56 U/L (ref 40–150)
ALT SERPL W P-5'-P-CCNC: 11 U/L (ref 0–70)
ANION GAP SERPL CALCULATED.3IONS-SCNC: 8 MMOL/L (ref 7–15)
AST SERPL W P-5'-P-CCNC: 20 U/L (ref 0–45)
BILIRUB SERPL-MCNC: 0.3 MG/DL
BUN SERPL-MCNC: 12 MG/DL (ref 8–23)
CALCIUM SERPL-MCNC: 9.6 MG/DL (ref 8.6–10)
CHLORIDE SERPL-SCNC: 105 MMOL/L (ref 98–107)
CHOLEST SERPL-MCNC: 87 MG/DL
CREAT SERPL-MCNC: 1.1 MG/DL (ref 0.67–1.17)
DEPRECATED HCO3 PLAS-SCNC: 27 MMOL/L (ref 22–29)
EGFRCR SERPLBLD CKD-EPI 2021: 77 ML/MIN/1.73M2
FASTING STATUS PATIENT QL REPORTED: YES
GLUCOSE SERPL-MCNC: 108 MG/DL (ref 70–99)
HBA1C MFR BLD: 5.9 % (ref 0–5.6)
HDLC SERPL-MCNC: 39 MG/DL
LDLC SERPL CALC-MCNC: 23 MG/DL
NONHDLC SERPL-MCNC: 48 MG/DL
POTASSIUM SERPL-SCNC: 4.4 MMOL/L (ref 3.4–5.3)
PROT SERPL-MCNC: 7.3 G/DL (ref 6.4–8.3)
SODIUM SERPL-SCNC: 140 MMOL/L (ref 135–145)
TRIGL SERPL-MCNC: 126 MG/DL

## 2023-12-29 PROCEDURE — 80053 COMPREHEN METABOLIC PANEL: CPT | Performed by: PHYSICIAN ASSISTANT

## 2023-12-29 PROCEDURE — 99214 OFFICE O/P EST MOD 30 MIN: CPT | Performed by: PHYSICIAN ASSISTANT

## 2023-12-29 PROCEDURE — 83036 HEMOGLOBIN GLYCOSYLATED A1C: CPT | Performed by: PHYSICIAN ASSISTANT

## 2023-12-29 PROCEDURE — 80061 LIPID PANEL: CPT | Performed by: PHYSICIAN ASSISTANT

## 2023-12-29 PROCEDURE — 36415 COLL VENOUS BLD VENIPUNCTURE: CPT | Performed by: PHYSICIAN ASSISTANT

## 2023-12-29 RX ORDER — MECLIZINE HYDROCHLORIDE 25 MG/1
25-50 TABLET ORAL
COMMUNITY
Start: 2023-12-18 | End: 2024-06-06

## 2023-12-29 RX ORDER — HYDROXYZINE HYDROCHLORIDE 25 MG/1
25 TABLET, FILM COATED ORAL 3 TIMES DAILY PRN
Qty: 60 TABLET | Refills: 0 | Status: SHIPPED | OUTPATIENT
Start: 2023-12-29

## 2023-12-29 RX ORDER — CYCLOBENZAPRINE HCL 5 MG
TABLET ORAL
Qty: 60 TABLET | Refills: 0 | Status: SHIPPED | OUTPATIENT
Start: 2023-12-29

## 2023-12-29 ASSESSMENT — PAIN SCALES - GENERAL: PAINLEVEL: NO PAIN (0)

## 2023-12-29 NOTE — PROGRESS NOTES
"  Assessment & Plan       ICD-10-CM    1. Type 2 diabetes mellitus without complication, without long-term current use of insulin (H)  E11.9 HEMOGLOBIN A1C     Comprehensive metabolic panel (BMP + Alb, Alk Phos, ALT, AST, Total. Bili, TP)     FOOT EXAM     HEMOGLOBIN A1C     Comprehensive metabolic panel (BMP + Alb, Alk Phos, ALT, AST, Total. Bili, TP)      2. High cholesterol  E78.00 Lipid panel reflex to direct LDL Fasting     Lipid panel reflex to direct LDL Fasting      3. Hypertension goal BP (blood pressure) < 140/90  I10       4. Itching  L29.9 hydrOXYzine HCl (ATARAX) 25 MG tablet      5. Neck pain  M54.2 cyclobenzaprine (FLEXERIL) 5 MG tablet      6. Vertigo  R42 Adult Neurology  Referral        1-3. medical conditions are stable. meds refilled.  4.  Prescription of hydroxyzine was given as needed.  Labs are pending.  5.  Medical conditions stable care for refills.  6.  Follow-up with neurology for second opinion.      BMI:   Estimated body mass index is 34.97 kg/m  as calculated from the following:    Height as of this encounter: 1.727 m (5' 8\").    Weight as of this encounter: 104.3 kg (230 lb).   Weight management plan: Discussed healthy diet and exercise guidelines      Mohit Randall PA-C  [facility]   Gregg is a 58 year old, presenting for the following health issues:  Recheck Medication, Abdominal Pain, and Dizziness        12/29/2023    11:23 AM   Additional Questions   Roomed by amber   Accompanied by self         12/29/2023    11:23 AM   Patient Reported Additional Medications   Patient reports taking the following new medications no       History of Present Illness       Diabetes:   He presents for follow up of diabetes.  He is checking home blood glucose a few times a month.   He checks blood glucose before meals.  Blood glucose is sometimes over 200 and never under 70. He is aware of hypoglycemia symptoms including shakiness.    He has no " concerns regarding his diabetes at this time.   He is not experiencing numbness or burning in feet, excessive thirst, blurry vision, weight changes or redness, sores or blisters on feet. The patient has not had a diabetic eye exam in the last 12 months.          Hypertension: He presents for follow up of hypertension.  He does check blood pressure  regularly outside of the clinic. Outside blood pressures have been over 140/90. He follows a low salt diet.     He eats 0-1 servings of fruits and vegetables daily.He consumes 1 sweetened beverage(s) daily.He exercises with enough effort to increase his heart rate 20 to 29 minutes per day.    He is taking medications regularly.       Diabetes Follow-up    How often are you checking your blood sugar? A few times a week  What time of day are you checking your blood sugars (select all that apply)?  Before meals  Have you had any blood sugars above 200?  No  Have you had any blood sugars below 70?  No  What symptoms do you notice when your blood sugar is low?  None  What concerns do you have today about your diabetes? None   Do you have any of these symptoms? (Select all that apply)  No numbness or tingling in feet.  No redness, sores or blisters on feet.  No complaints of excessive thirst.  No reports of blurry vision.  No significant changes to weight.  Have you had a diabetic eye exam in the last 12 months? No            Hyperlipidemia Follow-Up    Are you regularly taking any medication or supplement to lower your cholesterol?   Yes- Lipitor  Are you having muscle aches or other side effects that you think could be caused by your cholesterol lowering medication?  No    Hypertension Follow-up    Do you check your blood pressure regularly outside of the clinic? Yes   Are you following a low salt diet? Yes  Are your blood pressures ever more than 140 on the top number (systolic) OR more   than 90 on the bottom number (diastolic), for example 140/90? No    Also has noticed  "some off-and-on itching of his abdomen and did a Google research and wants to make sure that his liver is okay.  No new lotions detergents or shampoos.    Was recently seen in ED for vertigo/dizziness and is to follow up  with Neurology. Care Everywhere Reviewed  CT head scan : 1.  No acute intracranial process.   2.  Mild to moderate scattered nonspecific foci of T2/FLAIR hyperintense signal in the cerebral white matter. These findings are most often seen in the setting of chronic microangiopathic ischemic change. Other differential diagnostic considerations include a demyelinating process, autoimmune vasculitis, Lyme disease, or even chronic migraine effect, among other etiologies.   3.  Right-sided mastoid effusion.       BP Readings from Last 2 Encounters:   12/29/23 128/80   08/28/23 132/70     Hemoglobin A1C (%)   Date Value   06/06/2023 7.4 (H)   08/25/2022 6.8 (H)   06/29/2021 6.6 (H)   09/03/2020 6.3 (H)     LDL Cholesterol Calculated (mg/dL)   Date Value   06/06/2023 30   08/25/2022 88   06/29/2021 81   09/03/2020 28     Off and on upper trap tightness and would like a refill of muscle relaxants.       Review of Systems   Constitutional, HEENT, cardiovascular, pulmonary, gi and gu systems are negative, except as otherwise noted.      Objective    /80   Pulse 91   Temp (!) 96.2  F (35.7  C) (Tympanic)   Resp 18   Ht 1.727 m (5' 8\")   Wt 104.3 kg (230 lb)   SpO2 100%   BMI 34.97 kg/m    Body mass index is 34.97 kg/m .  Physical Exam   GENERAL: healthy, alert and no distress  EYES: Eyes grossly normal to inspection, PERRL and conjunctivae and sclerae normal  HENT: ear canals and TM's normal, nose and mouth without ulcers or lesions  NECK: no adenopathy, no asymmetry, masses, or scars and thyroid normal to palpation  RESP: lungs clear to auscultation - no rales, rhonchi or wheezes  CV: regular rate and rhythm, normal S1 S2, no S3 or S4, no murmur, click or rub, no peripheral edema and peripheral " pulses strong  ABDOMEN: soft, nontender, no hepatosplenomegaly, no masses and bowel sounds normal  MS: no gross musculoskeletal defects noted, no edema  SKIN: no suspicious lesions or rashes  NEURO: Normal strength and tone, mentation intact and speech normal  PSYCH: mentation appears normal, affect normal/bright    Labs pending

## 2024-01-02 RX ORDER — ATORVASTATIN CALCIUM 40 MG/1
20 TABLET, FILM COATED ORAL DAILY
Start: 2024-01-02 | End: 2024-06-07 | Stop reason: DRUGHIGH

## 2024-01-04 NOTE — COMMUNITY RESOURCES LIST (ENGLISH)
01/04/2024   [facility]  N/A  For questions about this resource list or additional care needs, please contact your primary care clinic or care manager.  Phone: [phone]   Email: N/A   Address: [facility] [place] [address]   Hours: N/A        Hotlines and Helplines       Hotline - Housing crisis  1  [facility] Housing Resource Line Distance: 5.76 miles      Phone/Virtual   [address] [place] [address]  Language: English  Hours: Mon - Sun Open 24 Hours   Phone: (650) 646-8174 Website: https://www.[facility]./2689/Basic-Needs     2  [facility] Distance: 14.17 miles      Phone/Virtual   [address] [place] [address]  Language: English  Hours: Mon - Sun Open 24 Hours   Phone: (270) 995-5623 Email: communications@[facility].org Website: https://[facility].org/oursaviourshousing/          Housing       Coordinated Entry access point  3  [facility]  Office - [facility] Distance: 2.9 miles      Phone/Virtual   [address] [place] [address]  Language: English  Hours: Mon - Fri 8:30 AM - 12:00 PM , Mon - Fri 1:00 PM - 4:00 PM  Fees: Free   Phone: (861) 611-2845 Ext.2 Email: nannette@[facility].org Website: https://www.[facility].org/usn/     4  [facility] ([facility] - Housing Services Distance: 14.27 miles      In-Person   [address] [place] [address]  Language: English  Hours: Mon - Fri 9:00 AM - 5:00 PM  Fees: Free   Phone: (135) 615-9738 Email: housing@[facility].org Website: http://www.[facility].org/housing     Drop-in center or day shelter  5  [facility] Distance: 12.56 miles      In-Person   [address] [place] [address]  Language: English, Hmong, South Korean, Nepalese  Hours: Mon - Thu 8:30 AM - 4:30 PM , Sat - Sun 9:00 AM - 12:00 PM  Fees: Free   Phone: (586) 495-2163 Email: info@[employer].org Website: https://[employer].org/     6  [facility]  [facility] and [facility] [facility] Distance: 13.74 miles      In-Person   [address] [place] [address]  Language: English, Nigerien, Armenian  Hours: Mon - Sat 7:00 AM - 3:00 PM  Fees: Free, Self Pay   Phone: (411) 800-3817 Email: info@[employer] Website: https://www.[employer]/locations/[facility]/     Housing search assistance  7  [facility] ([facility] - [facility] Rental Housing Distance: 2.91 miles      In-Person, Phone/Virtual   [address] [place] [address]  Language: English  Hours: Mon - Fri 8:00 AM - 4:30 PM  Fees: Free   Phone: (207) 555-9165 Email: accap@[facility].org Website: http://www.accap.[employer]     8  Neighborhood Assistance [employer] Distance: 6.83 miles      Phone/Virtual   [address] [facility] [address] [place] [address]  Language: English, Armenian  Hours: Mon - Fri 9:00 AM - 5:00 PM  Fees: Free   Phone: (516) 909-4623 Email: services@[employer] Website: https://www.[employer]     Shelter for families  9  [facility] Distance: 13.89 miles      In-Person   [address] [place] [address]  Language: English  Hours: Mon - Fri 3:00 PM - 9:00 AM , Sat - Sun Open 24 Hours  Fees: Free   Phone: (696) 750-9444 Ext.1 Website: https://www.[facility].org/2020/07/03/emergency-family-shelter/     Shelter for individuals  10  [facility] and [place] [facility] Distance: 12.88 miles      In-Person   [address] [place] [address]  Language: English  Hours: Mon - Sun 5:00 PM - 10:00 AM  Fees: Free, Self Pay   Phone: (175) 899-4575 Email: info@[employer] Website: https://www.[employer]/locations/higher-ground-shelter/     11  [facility] Distance: 12.91 miles      In-Person   [address] [place] [address]  Language: English  Hours: Mon - Fri 4:00 PM - 9:00 AM  Fees: Free    Phone: (722) 496-5654 Email: renate@[facility].[employer].org Website: https://[facility].org/[facility]/          Important Numbers & Websites       Emergency Services   911  [facility] Services   311  Poison Control   (633) 893-7701  Suicide Prevention Lifeline   (188) 825-3770 (TALK)  Child Abuse Hotline   (184) 648-5561 (4-A-Child)  Sexual Assault Hotline   (525) 827-6308 (HOPE)  National Runaway Safeline   (385) 112-1624 (RUNAWAY)  All-Options Talkline   (774) 994-4136  Substance Abuse Referral   (778) 611-7481 (HELP)

## 2024-01-10 DIAGNOSIS — E11.9 TYPE 2 DIABETES MELLITUS WITHOUT COMPLICATION, WITHOUT LONG-TERM CURRENT USE OF INSULIN (H): ICD-10-CM

## 2024-01-11 RX ORDER — METFORMIN HCL 500 MG
2000 TABLET, EXTENDED RELEASE 24 HR ORAL
Qty: 360 TABLET | Refills: 0 | OUTPATIENT
Start: 2024-01-11

## 2024-01-12 RX ORDER — METFORMIN HCL 500 MG
2000 TABLET, EXTENDED RELEASE 24 HR ORAL
Qty: 360 TABLET | Refills: 1 | Status: SHIPPED | OUTPATIENT
Start: 2024-01-12 | End: 2024-03-08

## 2024-01-12 NOTE — TELEPHONE ENCOUNTER
This refill request was made due to patient losing medication-pharmacy gave emergency refill but patient will be out after today. Please advise.        Konrad Tobin

## 2024-01-23 DIAGNOSIS — I10 HYPERTENSION GOAL BP (BLOOD PRESSURE) < 140/90: ICD-10-CM

## 2024-01-23 RX ORDER — LISINOPRIL 20 MG/1
TABLET ORAL
Qty: 90 TABLET | Refills: 1 | OUTPATIENT
Start: 2024-01-23

## 2024-02-08 ENCOUNTER — TELEPHONE (OUTPATIENT)
Dept: FAMILY MEDICINE | Facility: CLINIC | Age: 59
End: 2024-02-08
Payer: COMMERCIAL

## 2024-02-08 DIAGNOSIS — R42 VERTIGO: Primary | ICD-10-CM

## 2024-02-08 NOTE — TELEPHONE ENCOUNTER
Order/Referral Request    Who is requesting: PT    Orders being requested: Referral to [facility] for sleep apnea and neurology issues    Reason service is needed/diagnosis: Unknown    When are orders needed by: ASAP    Has this been discussed with Provider: Yes, for neurology but not to [facility].     Does patient have a preference on a Group/Provider/Facility? N/A    Does patient have an appointment scheduled?: No    Where to send orders: Fax to [phone]    Could we send this information to you in [place] or would you prefer to receive a phone call?:   No preference   Okay to leave a detailed message?: No at Cell number on file:    Telephone Information:   Mobile [phone]

## 2024-02-08 NOTE — TELEPHONE ENCOUNTER
Please clarify  He has seen sleep clinic on 8/21 with [facility].  Does he need a updated referral to see them again.?      Thanks  Mohit Randall PA-C

## 2024-02-09 NOTE — TELEPHONE ENCOUNTER
In order for Bari to see Gregg, Bari is asking for Mohit Randall to write a letter that Bari can see Gregg to see if they can find out what else may be causing his dizziness/ neurological issues other than his sleep apnea, and or other treatment options to help with the vertigo symptoms    His appointment with neurology isn't until 6/6/24 and Bari can see Gregg sooner than this.    Sounds like he is needing a referral for Bari to address his vertigo, prior to seeing neurology in June.     Please advise  Thank you,  Addis MEYER    [phone]

## 2024-02-13 NOTE — TELEPHONE ENCOUNTER
Is [facility] a neurology clinic or a PHYSICAL THERAPY clinic for dizziness?  Just making sure I place the correct referral.    Thanks  Mohit Randall PA-C

## 2024-02-26 ENCOUNTER — TELEPHONE (OUTPATIENT)
Dept: FAMILY MEDICINE | Facility: CLINIC | Age: 59
End: 2024-02-26
Payer: COMMERCIAL

## 2024-02-26 DIAGNOSIS — G47.33 OSA (OBSTRUCTIVE SLEEP APNEA): Primary | Chronic | ICD-10-CM

## 2024-02-26 NOTE — TELEPHONE ENCOUNTER
Order/Referral Request    Who is requesting: Jehovah's witness/Patient    Orders being requested: Need a new Referral placed to state the following:  Jehovah's witness to address patient's sleep apnea issues and/or possible new prescription for sleep apnea issues.    Where to send orders:  Please fax to Jehovah's witness at [phone].  Please call and inform patient when this has bee completed.

## 2024-03-04 ENCOUNTER — ALLIED HEALTH/NURSE VISIT (OUTPATIENT)
Dept: FAMILY MEDICINE | Facility: CLINIC | Age: 59
End: 2024-03-04
Payer: COMMERCIAL

## 2024-03-04 VITALS — DIASTOLIC BLOOD PRESSURE: 84 MMHG | HEART RATE: 70 BPM | SYSTOLIC BLOOD PRESSURE: 138 MMHG

## 2024-03-04 DIAGNOSIS — Z01.30 BLOOD PRESSURE CHECK: Primary | ICD-10-CM

## 2024-03-04 PROCEDURE — 99207 PR NO CHARGE NURSE ONLY: CPT | Performed by: PHYSICIAN ASSISTANT

## 2024-03-04 NOTE — PROGRESS NOTES
Gregg Lua was evaluated at [facility] on March 4, 2024 at which time his blood pressure was:    BP Readings from Last 3 Encounters:   03/04/24 138/84   12/29/23 128/80   08/28/23 132/70     Pulse Readings from Last 3 Encounters:   03/04/24 70   12/29/23 91   08/28/23 69       Reviewed lifestyle modifications for blood pressure control and reduction: including making healthy food choices, managing weight, getting regular exercise, smoking cessation, reducing alcohol consumption, monitoring blood pressure regularly.     Symptoms: None    BP Goal:< 140/90 mmHg    BP Assessment:  1st blood pressure reading is 140/76 pulse 90.  2nd blood pressure readings which is 15minutes after is 138/84 pulse 70.    Potential Reasons for BP too high: patient said he has been having some dizziness.  He thinks it can be too much from medication.  Patient purposely didn't take medication before he came in for blood pressure check.  Patient is currently taking lisinopril and amlodipine.    Would patient benefit to how his blood pressures medication or stop lisinopril    BP Follow-Up Plan: Recheck BP in 30 days at pharmacy.  Patient is going to see md this Friday 3/8    Recommendation to Provider: Would patient benefit to how his blood pressures medication or stop lisinopril?    Note completed by: Xiomara Monroy, Pharm D  [facility]  [phone]

## 2024-03-08 ENCOUNTER — TELEPHONE (OUTPATIENT)
Dept: FAMILY MEDICINE | Facility: CLINIC | Age: 59
End: 2024-03-08

## 2024-03-08 ENCOUNTER — OFFICE VISIT (OUTPATIENT)
Dept: FAMILY MEDICINE | Facility: CLINIC | Age: 59
End: 2024-03-08
Payer: COMMERCIAL

## 2024-03-08 VITALS
OXYGEN SATURATION: 100 % | BODY MASS INDEX: 35.46 KG/M2 | TEMPERATURE: 96.4 F | HEIGHT: 68 IN | WEIGHT: 234 LBS | RESPIRATION RATE: 20 BRPM | SYSTOLIC BLOOD PRESSURE: 144 MMHG | DIASTOLIC BLOOD PRESSURE: 84 MMHG | HEART RATE: 111 BPM

## 2024-03-08 DIAGNOSIS — E11.9 TYPE 2 DIABETES MELLITUS WITHOUT COMPLICATION, WITHOUT LONG-TERM CURRENT USE OF INSULIN (H): Primary | ICD-10-CM

## 2024-03-08 DIAGNOSIS — I10 HYPERTENSION GOAL BP (BLOOD PRESSURE) < 140/90: ICD-10-CM

## 2024-03-08 DIAGNOSIS — E78.00 HIGH CHOLESTEROL: ICD-10-CM

## 2024-03-08 DIAGNOSIS — R53.83 OTHER FATIGUE: ICD-10-CM

## 2024-03-08 PROCEDURE — 99214 OFFICE O/P EST MOD 30 MIN: CPT | Performed by: PHYSICIAN ASSISTANT

## 2024-03-08 PROCEDURE — 82607 VITAMIN B-12: CPT | Performed by: PHYSICIAN ASSISTANT

## 2024-03-08 PROCEDURE — 84443 ASSAY THYROID STIM HORMONE: CPT | Performed by: PHYSICIAN ASSISTANT

## 2024-03-08 PROCEDURE — 82306 VITAMIN D 25 HYDROXY: CPT | Performed by: PHYSICIAN ASSISTANT

## 2024-03-08 PROCEDURE — 36415 COLL VENOUS BLD VENIPUNCTURE: CPT | Performed by: PHYSICIAN ASSISTANT

## 2024-03-08 PROCEDURE — 80061 LIPID PANEL: CPT | Performed by: PHYSICIAN ASSISTANT

## 2024-03-08 RX ORDER — METFORMIN HCL 500 MG
500 TABLET, EXTENDED RELEASE 24 HR ORAL 2 TIMES DAILY WITH MEALS
Qty: 360 TABLET | Refills: 1 | Status: SHIPPED | OUTPATIENT
Start: 2024-03-08 | End: 2024-04-18

## 2024-03-08 RX ORDER — METOPROLOL SUCCINATE 25 MG/1
25 TABLET, EXTENDED RELEASE ORAL DAILY
Qty: 30 TABLET | Refills: 0 | Status: SHIPPED | OUTPATIENT
Start: 2024-03-08 | End: 2024-03-22

## 2024-03-08 ASSESSMENT — PATIENT HEALTH QUESTIONNAIRE - PHQ9
10. IF YOU CHECKED OFF ANY PROBLEMS, HOW DIFFICULT HAVE THESE PROBLEMS MADE IT FOR YOU TO DO YOUR WORK, TAKE CARE OF THINGS AT HOME, OR GET ALONG WITH OTHER PEOPLE: NOT DIFFICULT AT ALL
SUM OF ALL RESPONSES TO PHQ QUESTIONS 1-9: 7
SUM OF ALL RESPONSES TO PHQ QUESTIONS 1-9: 7

## 2024-03-08 ASSESSMENT — PAIN SCALES - GENERAL: PAINLEVEL: EXTREME PAIN (8)

## 2024-03-08 NOTE — PROGRESS NOTES
Assessment & Plan       ICD-10-CM    1. Type 2 diabetes mellitus without complication, without long-term current use of insulin (H)  E11.9 Continuous Blood Gluc  (FREESTYLE ELODIA 2 READER) HIRA     Continuous Blood Gluc Sensor (FREESTYLE ELODIA 2 SENSOR) MISC     metFORMIN (GLUCOPHAGE XR) 500 MG 24 hr tablet      2. Hypertension goal BP (blood pressure) < 140/90  I10       3. Other fatigue  R53.83 metoprolol succinate ER (TOPROL XL) 25 MG 24 hr tablet     TSH with free T4 reflex     Vitamin D Deficiency     Vitamin B12     Echo Stress Echocardiogram     TSH with free T4 reflex     Vitamin D Deficiency     Vitamin B12      4. High cholesterol  E78.00 Lipid panel reflex to direct LDL Fasting      Talk to patient about his concerns.  Working to add metoprolol 25 mg a day to help with his blood pressure.  We talked about cutting down to 1 pill twice a day of his metformin as his diabetes is well-controlled.  He will continue follow-up with the specialist.  Recheck blood pressure in a couple weeks.        Liz sEcobedo is a 58 year old, presenting for the following health issues:  Diabetes    History of Present Illness       Back Pain:  He presents for follow up of back pain. Patient's back pain is a recurring problem.  Location of back pain:  Right side of neck  Description of back pain: gnawing  Back pain spreads: nowhere    Since patient first noticed back pain, pain is: unchanged  Does back pain interfere with his job:  Yes       Diabetes:   He presents for follow up of diabetes.  He is checking home blood glucose a few times a month.   He checks blood glucose before meals.  Blood glucose is sometimes over 200 and sometimes under 70. He is aware of hypoglycemia symptoms including shakiness and dizziness.   He is concerned about low blood sugar, several less than 70 in the past few weeks.    He is not experiencing numbness or burning in feet, excessive thirst, blurry vision, weight changes or redness,  "sores or blisters on feet. The patient has not had a diabetic eye exam in the last 12 months.          Hypertension: He presents for follow up of hypertension.  He does check blood pressure  regularly outside of the clinic. Outside blood pressures have been over 140/90. He follows a low salt diet.     He eats 0-1 servings of fruits and vegetables daily.He consumes 1 sweetened beverage(s) daily.He exercises with enough effort to increase his heart rate 10 to 19 minutes per day.  He exercises with enough effort to increase his heart rate 3 or less days per week. He is missing 1 dose(s) of medications per week.   Years of off and on vertigo.   Con't vertigo, fatigue and lack of energy.   Will gets episodes of fidgety/ hypoglycemia.   He also is in the process of seeing a specialist for his sleep apnea as he is wondering if this is the cause of some of his symptoms to.  He has tried taking 2 of his metformin in the morning at night versus all 4 pills at once that he does feel like that helps some of his fidgety hypoglycemic feelings.  He is requesting to CGM device.      Review of Systems  Constitutional, HEENT, cardiovascular, pulmonary, gi and gu systems are negative, except as otherwise noted.      Objective    BP (!) 144/84   Pulse 111   Temp (!) 96.4  F (35.8  C) (Tympanic)   Resp 20   Ht 1.727 m (5' 8\")   Wt 106.1 kg (234 lb)   SpO2 100%   BMI 35.58 kg/m    Body mass index is 35.58 kg/m .  Physical Exam   GENERAL: alert and no distress  EYES: Eyes grossly normal to inspection, PERRL and conjunctivae and sclerae normal  HENT: ear canals and TM's normal, nose and mouth without ulcers or lesions  NECK: no adenopathy, no asymmetry, masses, or scars  RESP: lungs clear to auscultation - no rales, rhonchi or wheezes  CV: regular rate and rhythm, normal S1 S2, no S3 or S4, no murmur, click or rub, no peripheral edema  ABDOMEN: soft, nontender, no hepatosplenomegaly, no masses and bowel sounds normal  MS: no gross " musculoskeletal defects noted, no edema  SKIN: no suspicious lesions or rashes  NEURO: Normal strength and tone, mentation intact and speech normal  PSYCH: mentation appears normal, affect normal/bright    Labs reviewed        Signed Electronically by: Mohit Randall PA-C

## 2024-03-08 NOTE — TELEPHONE ENCOUNTER
Fax message: Pt usually take 4 a day of   metFORMIN (GLUCOPHAGE XR) 500 MG 24 hr tablet   Is 2 a day correct? Please let us know. Thanks.

## 2024-03-08 NOTE — TELEPHONE ENCOUNTER
Called and left message to call clinic back at [phone], and speak to the nurse line to clarify metformin dose. Refill sent to pharmacy today.     Preeti Ross RN

## 2024-03-09 LAB
CHOLEST SERPL-MCNC: 108 MG/DL
FASTING STATUS PATIENT QL REPORTED: YES
HDLC SERPL-MCNC: 36 MG/DL
LDLC SERPL CALC-MCNC: 31 MG/DL
NONHDLC SERPL-MCNC: 72 MG/DL
TRIGL SERPL-MCNC: 203 MG/DL
TSH SERPL DL<=0.005 MIU/L-ACNC: 1.63 UIU/ML (ref 0.3–4.2)
VIT B12 SERPL-MCNC: 761 PG/ML (ref 232–1245)
VIT D+METAB SERPL-MCNC: 22 NG/ML (ref 20–50)

## 2024-03-11 NOTE — TELEPHONE ENCOUNTER
Provider:      Please verify that the pharmacy is to fill the Prescription(s) below.  Please send back to P AN RN to call pharmacy. If it is not the Prescription(s) below please send a new one to the pharmacy.    Patient is also requesting a referral to the diabetic educator to discuss CGMs. Thank you. Laury Marion R.N.        Patient reports that he was getting possible symptoms of low blood sugar and he was to drop the amount of metformin he was to take.      Ok to leave a message with the provider's response.

## 2024-03-13 ENCOUNTER — ALLIED HEALTH/NURSE VISIT (OUTPATIENT)
Dept: EDUCATION SERVICES | Facility: CLINIC | Age: 59
End: 2024-03-13
Attending: PHYSICIAN ASSISTANT
Payer: COMMERCIAL

## 2024-03-13 DIAGNOSIS — E11.9 TYPE 2 DIABETES MELLITUS WITHOUT COMPLICATION, WITHOUT LONG-TERM CURRENT USE OF INSULIN (H): ICD-10-CM

## 2024-03-13 PROCEDURE — G0108 DIAB MANAGE TRN  PER INDIV: HCPCS | Mod: AE

## 2024-03-13 RX ORDER — BLOOD-GLUCOSE SENSOR
1 EACH MISCELLANEOUS CONTINUOUS
Qty: 6 EACH | Refills: 11 | Status: SHIPPED | OUTPATIENT
Start: 2024-03-13 | End: 2024-03-27

## 2024-03-13 RX ORDER — BLOOD-GLUCOSE SENSOR
1 EACH MISCELLANEOUS CONTINUOUS
Qty: 1 EACH | Refills: 0 | Status: SHIPPED | OUTPATIENT
Start: 2024-03-13

## 2024-03-13 NOTE — PROGRESS NOTES
Diabetes Self-Management Education & Support    Presents for: Individual review    Type of Service: In Person Visit      ASSESSMENT:  Gregg has had diabetes for many yrs.  And is now thinking to cut back on his foods to help his BG , but now he is seeing he does not have enough energy to make it through going to work out for instance.  Only eating fruits and veggies. He does have some symptoms of what appears to be low BG , only taking Metformin, not sure if it is BG or BP.  Placing him on a CGM for him to see if when he feels dizzy or sweating if it is BG .  Will evaluate and assess in 4 wks to see how he is doing.    Patient's most recent   Lab Results   Component Value Date    A1C 5.9 12/29/2023    A1C 6.6 06/29/2021     is meeting goal of <7.0    Diabetes knowledge and skills assessment:   Patient is knowledgeable in diabetes management concepts related to: Taking Medication and Healthy Coping    Continue education with the following diabetes management concepts: Healthy Eating, Being Active, Monitoring, Taking Medication, Problem Solving, and Reducing Risks    Based on learning assessment above, most appropriate setting for further diabetes education would be: Individual setting.      PLAN  Metformin  mg take 2 pills twice per day.  With meals  3 meals per day, 3-5 carb choices each meal. + proteins   2-3 snacks with 1-2 carb choices each snack + proteins.    Start back to work out.  10-15 minutes each time and progress    Topics to cover at upcoming visits: Healthy Eating, Monitoring, Taking Medication, and Problem Solving    Follow-up: 4 wks    See Care Plan for co-developed, patient-state behavior change goals.  AVS provided for patient today.    Education Materials Provided:  [facility] Understanding Diabetes Booklet, Carbohydrate Counting, Hypoglycemia Signs and Symptoms, and My Plate Planner      SUBJECTIVE/OBJECTIVE:  Presents for: Individual review  Accompanied by: Self  Diabetes education  "in the past 24mo: No  Focus of Visit: Taking Medication  Diabetes type: Type 2  Disease course: Improving  Transportation concerns: No  Difficulty affording diabetes medication?: No  Difficulty affording diabetes testing supplies?: No  Other concerns:: None  Cultural Influences/Ethnic Background:  Choose not to answer      Diabetes Symptoms & Complications:  Weight trend: Stable  Symptom course: Stable  Disease course: Improving       Patient Problem List and Family Medical History reviewed for relevant medical history, current medical status, and diabetes risk factors.    Vitals:  There were no vitals taken for this visit.  Estimated body mass index is 35.58 kg/m  as calculated from the following:    Height as of 3/8/24: 1.727 m (5' 8\").    Weight as of 3/8/24: 106.1 kg (234 lb).   Last 3 BP:   BP Readings from Last 3 Encounters:   03/08/24 (!) 144/84   03/04/24 138/84   12/29/23 128/80       History   Smoking Status    Never   Smokeless Tobacco    Never       Labs:  Lab Results   Component Value Date    A1C 5.9 12/29/2023    A1C 6.6 06/29/2021     Lab Results   Component Value Date     12/29/2023     08/25/2022     06/29/2021     Lab Results   Component Value Date    LDL 31 03/08/2024    LDL 81 06/29/2021     HDL Cholesterol   Date Value Ref Range Status   06/29/2021 42 >39 mg/dL Final     Direct Measure HDL   Date Value Ref Range Status   03/08/2024 36 (L) >=40 mg/dL Final   ]  GFR Estimate   Date Value Ref Range Status   12/29/2023 77 >60 mL/min/1.73m2 Final   06/29/2021 77 >60 mL/min/[1.73_m2] Final     Comment:     Non  GFR Calc  Starting 12/18/2018, serum creatinine based estimated GFR (eGFR) will be   calculated using the Chronic Kidney Disease Epidemiology Collaboration   (CKD-EPI) equation.       GFR Estimate If Black   Date Value Ref Range Status   06/29/2021 89 >60 mL/min/[1.73_m2] Final     Comment:      GFR Calc  Starting 12/18/2018, serum creatinine " "based estimated GFR (eGFR) will be   calculated using the Chronic Kidney Disease Epidemiology Collaboration   (CKD-EPI) equation.       Lab Results   Component Value Date    CR 1.10 12/29/2023    CR 1.07 06/29/2021     No results found for: \"MICROALBUMIN\"    Healthy Eating:  Healthy Eating Assessed Today: Yes  Cultural/Congregation diet restrictions?: No  Do you have any food allergies or intolerances?: No  Meal planning/habits: Smaller portions  Who cooks/prepares meals for you?: Self, Spouse  Who purchases food in  your home?: Spouse, Self  Breakfast: banana ensure drink or 2 fruits and next day had a bagel and some syrup  Lunch: veggies now adding wonton soup.  Dinner: had sushi one night, or chx or fish, or some meat, avoiding potatoes.  Snacks: veggie  Beverages: Water  Has patient met with a dietitian in the past?: No    Being Active:  Being Active Assessed Today: Yes  Exercise:: Yes  Days per week of moderate to strenuous exercise (like a brisk walk): 3  On average, minutes per day of exercise at this level: 30  How intense was your typical exercise? : Moderate (like brisk walking)  Exercise Minutes per Week: 90  Barrier to exercise: None    Monitoring:  Monitoring Assessed Today: No    Has not been checking    Taking Medications:  Diabetes Medication(s)       Biguanides       metFORMIN (GLUCOPHAGE XR) 500 MG 24 hr tablet Take 1 tablet (500 mg) by mouth 2 times daily (with meals)            Taking Medication Assessed Today: Yes  Current Treatments: Oral Medication (taken by mouth)  Problems taking diabetes medications regularly?: No  Diabetes medication side effects?: No    Problem Solving:                 Reducing Risks:  Reducing Risks Assessed Today: Yes  Diabetes Risks: Age over 45 years, Family History, Ethnicity, Hypertriglyceridemia  CAD Risks: Hypertension, Male sex, Family history, Diabetes Mellitus    Healthy Coping:  Healthy Coping Assessed Today: Yes  Emotional response to diabetes: Ready to learn, " Acceptance  Informal Support system:: Spouse  Stage of change: PREPARATION (Decided to change - considering how)  Patient Activation Measure Survey Score:      11/8/2021     8:22 PM   AUGUSTUS Score (Last Two)   AUGUSTUS Raw Score 31   Activation Score 59.3   AUGUSTUS Level 3         Care Plan and Education Provided:  Care Plan: Diabetes   Updates made by Rosalind Garcia RN since 3/13/2024 12:00 AM        Problem: HbA1C Not In Goal         Goal: Establish Regular Follow-Ups with PCP         Task: Discuss with PCP the recommended timing for patient's next follow up visit(s) Completed 3/13/2024   Responsible User: Rosalind Garcia RN        Task: Discuss schedule for PCP visits with patient Completed 3/13/2024   Responsible User: Rosalind Garcia RN        Goal: Get HbA1C Level in Goal    Start Date: 3/13/2024   This Visit's Progress: 100%        Task: Educate patient on diabetes education self-management topics Completed 3/13/2024   Responsible User: Rosalind Garcia RN        Task: Educate patient on benefits of regular glucose monitoring Completed 3/13/2024   Responsible User: Rosalind Garcia RN        Task: Refer patient to appropriate extended care team member, as needed (Medication Therapy Management, Behavioral Health, Physical Therapy, etc.)    Responsible User: Rosalind Garcia RN        Task: Discuss diabetes treatment plan with patient    Responsible User: Rosalind Garcia RN        Problem: Diabetes Self-Management Education Needed to Optimize Self-Care Behaviors         Goal: Understand diabetes pathophysiology and disease progression         Task: Provide education on diabetes pathophysiology and disease progression specfic to patient's diabetes type    Responsible User: Rosalind Garcia RN        Goal: Healthy Eating - follow a healthy eating pattern for diabetes    Start Date: 3/13/2024   This Visit's Progress: 40%        Task: Provide education on portion control and consistency in amount, composition and timing of food  intake Completed 3/13/2024   Responsible User: Rosalind Garcia RN        Task: Provide education on managing carbohydrate intake (carbohydrate counting, plate planning method, etc.) Completed 3/13/2024   Responsible User: Rosalind Garcia RN        Task: Provide education on weight management    Responsible User: Rosalind Garcia RN        Task: Provide education on heart healthy eating    Responsible User: Rosalind Garcia RN        Task: Provide education on eating out    Responsible User: Rosalind Garcia RN        Task: Develop individualized healthy eating plan with patient    Responsible User: Rosalind Garcia RN        Goal: Being Active - get regular physical activity, working up to at least 150 minutes per week    Start Date: 3/13/2024   This Visit's Progress: 50%        Task: Provide education on relationship of activity to glucose and precautions to take if at risk for low glucose Completed 3/13/2024   Responsible User: Rosalind Garcia RN        Task: Discuss barriers to physical activity with patient Completed 3/13/2024   Responsible User: Rosalind Garcia RN        Task: Develop physical activity plan with patient    Responsible User: Rosalind Garcia RN        Task: Explore community resources including walking groups, assistance programs, and home videos    Responsible User: Rosalind Garcia RN        Goal: Monitoring - monitor glucose and ketones as directed         Task: Provide education on blood glucose monitoring (purpose, proper technique, frequency, glucose targets, interpreting results, when to use glucose control solution, sharps disposal) Completed 3/13/2024   Responsible User: Rosalind Garcia RN        Task: Provide education on continuous glucose monitoring (sensor placement, use of aravind or /reader, understanding glucose trends, alerts and alarms, differences between sensor glucose and blood glucose) Completed 3/13/2024   Responsible User: Rosalind Garcia RN        Task: Provide education  on ketone monitoring (when to monitor, frequency, etc.)    Responsible User: Rosalind Garcia RN        Goal: Taking Medication - patient is consistently taking medications as directed    Start Date: 3/13/2024   This Visit's Progress: 100%        Task: Provide education on action of prescribed medication, including when to take and possible side effects Completed 3/13/2024   Responsible User: Rosalind Garcia RN        Task: Provide education on insulin and injectable diabetes medications, including administration, storage, site selection and rotation for injection sites    Responsible User: Rosalind Garcia RN        Task: Discuss barriers to medication adherence with patient and provide management technique ideas as appropriate    Responsible User: Rosalind Garcia RN        Task: Provide education on frequency and refill details of medications    Responsible User: Rosalind Garcia RN        Goal: Problem Solving - know how to prevent and manage short-term diabetes complications         Task: Provide education on high blood glucose - causes, signs/symptoms, prevention and treatment Completed 3/13/2024   Responsible User: Rosalind Garcia RN        Task: Provide education on low blood glucose - causes, signs/symptoms, prevention, treatment, carrying a carbohydrate source at all times, and medical identification Completed 3/13/2024   Responsible User: Rosalind Garcia RN        Task: Provide education on safe travel with diabetes    Responsible User: Rosalind Garcia RN        Task: Provide education on how to care for diabetes on sick days    Responsible User: Rosalind Garcia RN        Task: Provide education on when to call a health care provider    Responsible User: Rosalind Garcia RN        Goal: Reducing Risks - know how to prevent and treat long-term diabetes complications         Task: Provide education on major complications of diabetes, prevention, early diagnostic measures and treatment of complications Completed  3/13/2024   Responsible User: Rosalind Garcia RN        Task: Provide education on recommended care for dental, eye and foot health    Responsible User: Rosalind Garcia RN        Task: Provide education on Hemoglobin A1c - goals and relationship to blood glucose levels    Responsible User: Rosalind Garcia RN        Task: Provide education on recommendations for heart health - lipid levels and goals, blood pressure and goals, and aspirin therapy, if indicated    Responsible User: Rosalind Garcia RN        Task: Provide education on tobacco cessation    Responsible User: Rosalind Garcia RN        Goal: Healthy Coping - use available resources to cope with the challenges of managing diabetes    Start Date: 3/13/2024   This Visit's Progress: 90%        Task: Discuss recognizing feelings about having diabetes Completed 3/13/2024   Responsible User: Rosalind Garcia RN        Task: Provide education on the benefits of making appropriate lifestyle changes Completed 3/13/2024   Responsible User: Rosalind Garcia RN        Task: Provide education on benefits of utilizing support systems    Responsible User: Rosalind Garcia RN        Task: Discuss methods for coping with stress    Responsible User: Rosalind Garcia RN        Task: Provide education on when to seek professional counseling    Responsible User: Rosalind Garcia RN Sue Truhler RN/LEANNE Smith Diabetes Educator        Time Spent: 60 minutes  Encounter Type: Individual    Any diabetes medication dose changes were made via the CDE Protocol per the patient's primary care provider. A copy of this encounter was shared with the provider.

## 2024-03-13 NOTE — LETTER
3/13/2024         RE: Gregg Lua  [id number] [address] [facility] [id number]        Dear Colleague,    Thank you for referring your patient, Gregg Lua, to the [facility]. Please see a copy of my visit note below.    Diabetes Self-Management Education & Support    Presents for: Individual review    Type of Service: In Person Visit      ASSESSMENT:  Gregg has had diabetes for many yrs.  And is now thinking to cut back on his foods to help his BG , but now he is seeing he does not have enough energy to make it through going to work out for instance.  Only eating fruits and veggies. He does have some symptoms of what appears to be low BG , only taking Metformin, not sure if it is BG or BP.  Placing him on a CGM for him to see if when he feels dizzy or sweating if it is BG .  Will evaluate and assess in 4 wks to see how he is doing.    Patient's most recent   Lab Results   Component Value Date    A1C 5.9 12/29/2023    A1C 6.6 06/29/2021     is meeting goal of <7.0    Diabetes knowledge and skills assessment:   Patient is knowledgeable in diabetes management concepts related to: Taking Medication and Healthy Coping    Continue education with the following diabetes management concepts: Healthy Eating, Being Active, Monitoring, Taking Medication, Problem Solving, and Reducing Risks    Based on learning assessment above, most appropriate setting for further diabetes education would be: Individual setting.      PLAN  Metformin  mg take 2 pills twice per day.  With meals  3 meals per day, 3-5 carb choices each meal. + proteins   2-3 snacks with 1-2 carb choices each snack + proteins.    Start back to work out.  10-15 minutes each time and progress    Topics to cover at upcoming visits: Healthy Eating, Monitoring, Taking Medication, and Problem Solving    Follow-up: 4 wks    See Care Plan for co-developed, patient-state behavior change goals.  AVS provided for patient today.    Education  "Materials Provided:   [employer] Understanding Diabetes Booklet, Carbohydrate Counting, Hypoglycemia Signs and Symptoms, and My Plate Planner      SUBJECTIVE/OBJECTIVE:  Presents for: Individual review  Accompanied by: Self  Diabetes education in the past 24mo: No  Focus of Visit: Taking Medication  Diabetes type: Type 2  Disease course: Improving  Transportation concerns: No  Difficulty affording diabetes medication?: No  Difficulty affording diabetes testing supplies?: No  Other concerns:: None  Cultural Influences/Ethnic Background:  Choose not to answer      Diabetes Symptoms & Complications:  Weight trend: Stable  Symptom course: Stable  Disease course: Improving       Patient Problem List and Family Medical History reviewed for relevant medical history, current medical status, and diabetes risk factors.    Vitals:  There were no vitals taken for this visit.  Estimated body mass index is 35.58 kg/m  as calculated from the following:    Height as of 3/8/24: 1.727 m (5' 8\").    Weight as of 3/8/24: 106.1 kg (234 lb).   Last 3 BP:   BP Readings from Last 3 Encounters:   03/08/24 (!) 144/84   03/04/24 138/84   12/29/23 128/80       History   Smoking Status     Never   Smokeless Tobacco     Never       Labs:  Lab Results   Component Value Date    A1C 5.9 12/29/2023    A1C 6.6 06/29/2021     Lab Results   Component Value Date     12/29/2023     08/25/2022     06/29/2021     Lab Results   Component Value Date    LDL 31 03/08/2024    LDL 81 06/29/2021     HDL Cholesterol   Date Value Ref Range Status   06/29/2021 42 >39 mg/dL Final     Direct Measure HDL   Date Value Ref Range Status   03/08/2024 36 (L) >=40 mg/dL Final   ]  GFR Estimate   Date Value Ref Range Status   12/29/2023 77 >60 mL/min/1.73m2 Final   06/29/2021 77 >60 mL/min/[1.73_m2] Final     Comment:     Non  GFR Calc  Starting 12/18/2018, serum creatinine based estimated GFR (eGFR) will be   calculated using the " "Chronic Kidney Disease Epidemiology Collaboration   (CKD-EPI) equation.       GFR Estimate If Black   Date Value Ref Range Status   06/29/2021 89 >60 mL/min/[1.73_m2] Final     Comment:      GFR Calc  Starting 12/18/2018, serum creatinine based estimated GFR (eGFR) will be   calculated using the Chronic Kidney Disease Epidemiology Collaboration   (CKD-EPI) equation.       Lab Results   Component Value Date    CR 1.10 12/29/2023    CR 1.07 06/29/2021     No results found for: \"MICROALBUMIN\"    Healthy Eating:  Healthy Eating Assessed Today: Yes  Cultural/Mormonism diet restrictions?: No  Do you have any food allergies or intolerances?: No  Meal planning/habits: Smaller portions  Who cooks/prepares meals for you?: Self, Spouse  Who purchases food in  your home?: Spouse, Self  Breakfast: banana ensure drink or 2 fruits and next day had a bagel and some syrup  Lunch: veggies now adding wonton soup.  Dinner: had sushi one night, or chx or fish, or some meat, avoiding potatoes.  Snacks: veggie  Beverages: Water  Has patient met with a dietitian in the past?: No    Being Active:  Being Active Assessed Today: Yes  Exercise:: Yes  Days per week of moderate to strenuous exercise (like a brisk walk): 3  On average, minutes per day of exercise at this level: 30  How intense was your typical exercise? : Moderate (like brisk walking)  Exercise Minutes per Week: 90  Barrier to exercise: None    Monitoring:  Monitoring Assessed Today: No    Has not been checking    Taking Medications:  Diabetes Medication(s)       Biguanides       metFORMIN (GLUCOPHAGE XR) 500 MG 24 hr tablet Take 1 tablet (500 mg) by mouth 2 times daily (with meals)            Taking Medication Assessed Today: Yes  Current Treatments: Oral Medication (taken by mouth)  Problems taking diabetes medications regularly?: No  Diabetes medication side effects?: No    Problem Solving:                 Reducing Risks:  Reducing Risks Assessed Today: " Yes  Diabetes Risks: Age over 45 years, Family History, Ethnicity, Hypertriglyceridemia  CAD Risks: Hypertension, Male sex, Family history, Diabetes Mellitus    Healthy Coping:  Healthy Coping Assessed Today: Yes  Emotional response to diabetes: Ready to learn, Acceptance  Informal Support system:: Spouse  Stage of change: PREPARATION (Decided to change - considering how)  Patient Activation Measure Survey Score:      11/8/2021     8:22 PM   AUGUSTUS Score (Last Two)   AUGUSTUS Raw Score 31   Activation Score 59.3   AUGUSTUS Level 3         Care Plan and Education Provided:  Care Plan: Diabetes   Updates made by Rosalind Garcia RN since 3/13/2024 12:00 AM        Problem: HbA1C Not In Goal         Goal: Establish Regular Follow-Ups with PCP         Task: Discuss with PCP the recommended timing for patient's next follow up visit(s) Completed 3/13/2024   Responsible User: Rosalind Garcia RN        Task: Discuss schedule for PCP visits with patient Completed 3/13/2024   Responsible User: Rosalind Garcia RN        Goal: Get HbA1C Level in Goal    Start Date: 3/13/2024   This Visit's Progress: 100%        Task: Educate patient on diabetes education self-management topics Completed 3/13/2024   Responsible User: Rosalind Garcia RN        Task: Educate patient on benefits of regular glucose monitoring Completed 3/13/2024   Responsible User: Rosalind Garcia RN        Task: Refer patient to appropriate extended care team member, as needed (Medication Therapy Management, Behavioral Health, Physical Therapy, etc.)    Responsible User: Rosalind Garcia RN        Task: Discuss diabetes treatment plan with patient    Responsible User: Rosalind Garcia RN        Problem: Diabetes Self-Management Education Needed to Optimize Self-Care Behaviors         Goal: Understand diabetes pathophysiology and disease progression         Task: Provide education on diabetes pathophysiology and disease progression specfic to patient's diabetes type    Responsible  User: Rosalind Garcia RN        Goal: Healthy Eating - follow a healthy eating pattern for diabetes    Start Date: 3/13/2024   This Visit's Progress: 40%        Task: Provide education on portion control and consistency in amount, composition and timing of food intake Completed 3/13/2024   Responsible User: Rosalind Garcia RN        Task: Provide education on managing carbohydrate intake (carbohydrate counting, plate planning method, etc.) Completed 3/13/2024   Responsible User: Rosalind Garcia RN        Task: Provide education on weight management    Responsible User: Rosalind Garcia RN        Task: Provide education on heart healthy eating    Responsible User: Rosalind Garcia RN        Task: Provide education on eating out    Responsible User: Rosalind Garcia RN        Task: Develop individualized healthy eating plan with patient    Responsible User: Rosalind Garcia RN        Goal: Being Active - get regular physical activity, working up to at least 150 minutes per week    Start Date: 3/13/2024   This Visit's Progress: 50%        Task: Provide education on relationship of activity to glucose and precautions to take if at risk for low glucose Completed 3/13/2024   Responsible User: Rosalind Garcia RN        Task: Discuss barriers to physical activity with patient Completed 3/13/2024   Responsible User: Rosalind Garcia RN        Task: Develop physical activity plan with patient    Responsible User: Rosalind Garcia RN        Task: Explore community resources including walking groups, assistance programs, and home videos    Responsible User: Rosalind Garcia RN        Goal: Monitoring - monitor glucose and ketones as directed         Task: Provide education on blood glucose monitoring (purpose, proper technique, frequency, glucose targets, interpreting results, when to use glucose control solution, sharps disposal) Completed 3/13/2024   Responsible User: Rosalind Garcia RN        Task: Provide education on continuous  glucose monitoring (sensor placement, use of aravind or /reader, understanding glucose trends, alerts and alarms, differences between sensor glucose and blood glucose) Completed 3/13/2024   Responsible User: Rosalind Garcia RN        Task: Provide education on ketone monitoring (when to monitor, frequency, etc.)    Responsible User: Rosalind Garcia RN        Goal: Taking Medication - patient is consistently taking medications as directed    Start Date: 3/13/2024   This Visit's Progress: 100%        Task: Provide education on action of prescribed medication, including when to take and possible side effects Completed 3/13/2024   Responsible User: Rosalind Garcia RN        Task: Provide education on insulin and injectable diabetes medications, including administration, storage, site selection and rotation for injection sites    Responsible User: Rosalind Garcia RN        Task: Discuss barriers to medication adherence with patient and provide management technique ideas as appropriate    Responsible User: Rosalind Garcia RN        Task: Provide education on frequency and refill details of medications    Responsible User: Rosalind Garcia RN        Goal: Problem Solving - know how to prevent and manage short-term diabetes complications         Task: Provide education on high blood glucose - causes, signs/symptoms, prevention and treatment Completed 3/13/2024   Responsible User: Rosalind Garcia RN        Task: Provide education on low blood glucose - causes, signs/symptoms, prevention, treatment, carrying a carbohydrate source at all times, and medical identification Completed 3/13/2024   Responsible User: Rosalind Garcia RN        Task: Provide education on safe travel with diabetes    Responsible User: Rosalind Garcia RN        Task: Provide education on how to care for diabetes on sick days    Responsible User: Rosalind Garcia RN        Task: Provide education on when to call a health care provider    Responsible  User: Rosalind Garcia RN        Goal: Reducing Risks - know how to prevent and treat long-term diabetes complications         Task: Provide education on major complications of diabetes, prevention, early diagnostic measures and treatment of complications Completed 3/13/2024   Responsible User: Rosalind Garcia RN        Task: Provide education on recommended care for dental, eye and foot health    Responsible User: Rosalind Garcia RN        Task: Provide education on Hemoglobin A1c - goals and relationship to blood glucose levels    Responsible User: Rosalind Garcia RN        Task: Provide education on recommendations for heart health - lipid levels and goals, blood pressure and goals, and aspirin therapy, if indicated    Responsible User: Rosalind Garcia RN        Task: Provide education on tobacco cessation    Responsible User: Rosalind Garcia RN        Goal: Healthy Coping - use available resources to cope with the challenges of managing diabetes    Start Date: 3/13/2024   This Visit's Progress: 90%        Task: Discuss recognizing feelings about having diabetes Completed 3/13/2024   Responsible User: Rosalind Garcia RN        Task: Provide education on the benefits of making appropriate lifestyle changes Completed 3/13/2024   Responsible User: Rosalind Garcia RN        Task: Provide education on benefits of utilizing support systems    Responsible User: Rosalind Garcia RN        Task: Discuss methods for coping with stress    Responsible User: Rosalind Garcia RN        Task: Provide education on when to seek professional counseling    Responsible User: Rosalind Garcia RN Sue Truhler RN/LEANNE Smith Diabetes Educator        Time Spent: 60 minutes  Encounter Type: Individual    Any diabetes medication dose changes were made via the CDE Protocol per the patient's primary care provider. A copy of this encounter was shared with the provider.

## 2024-03-22 ENCOUNTER — OFFICE VISIT (OUTPATIENT)
Dept: FAMILY MEDICINE | Facility: CLINIC | Age: 59
End: 2024-03-22
Payer: COMMERCIAL

## 2024-03-22 VITALS
TEMPERATURE: 96.9 F | WEIGHT: 233 LBS | SYSTOLIC BLOOD PRESSURE: 119 MMHG | HEIGHT: 68 IN | RESPIRATION RATE: 16 BRPM | HEART RATE: 76 BPM | BODY MASS INDEX: 35.31 KG/M2 | DIASTOLIC BLOOD PRESSURE: 77 MMHG | OXYGEN SATURATION: 100 %

## 2024-03-22 DIAGNOSIS — I10 HYPERTENSION GOAL BP (BLOOD PRESSURE) < 140/90: ICD-10-CM

## 2024-03-22 DIAGNOSIS — E66.01 MORBID OBESITY (H): ICD-10-CM

## 2024-03-22 DIAGNOSIS — E11.9 TYPE 2 DIABETES MELLITUS WITHOUT COMPLICATION, WITHOUT LONG-TERM CURRENT USE OF INSULIN (H): Primary | ICD-10-CM

## 2024-03-22 PROCEDURE — 99213 OFFICE O/P EST LOW 20 MIN: CPT | Performed by: PHYSICIAN ASSISTANT

## 2024-03-22 RX ORDER — LISINOPRIL 30 MG/1
30 TABLET ORAL DAILY
Qty: 30 TABLET | Refills: 0 | Status: SHIPPED | OUTPATIENT
Start: 2024-03-22 | End: 2024-04-18 | Stop reason: DRUGHIGH

## 2024-03-22 ASSESSMENT — PAIN SCALES - GENERAL: PAINLEVEL: NO PAIN (0)

## 2024-03-22 NOTE — PROGRESS NOTES
"  Assessment & Plan       ICD-10-CM    1. Type 2 diabetes mellitus without complication, without long-term current use of insulin (H)  E11.9 empagliflozin (JARDIANCE) 25 MG TABS tablet      2. Hypertension goal BP (blood pressure) < 140/90  I10 lisinopril (ZESTRIL) 30 MG tablet     Home Blood Pressure Monitor Order      3. Morbid obesity (H)  E66.01       Talk to patient about his concerns were going to stop the metoprolol and increase his lisinopril to 30 mg a day.  Were also going to keep him on metformin 1 pill twice a day and add Jardiance.  He is going to keep his appointment with diabetic education.  Recheck in 4 weeks.      Liz Escobedo is a 58 year old, presenting for the following health issues:  Hypertension    History of Present Illness       Hyperlipidemia:  He presents for follow up of hyperlipidemia.   He is taking medication to lower cholesterol. He is not having myalgia or other side effects to statin medications.    Hypertension: He presents for follow up of hypertension.  He does check blood pressure  regularly outside of the clinic. Outside blood pressures have been over 140/90. He follows a low salt diet.     Headaches:   Since the patient's last clinic visit, headaches are: no change  The patient is getting headaches:  Twicw a week  He is able to do normal daily activities when he has a migraine.  The patient is taking the following rescue/relief medications:  Tylenol   Patient states \"I get only a small amount of relief\" from the rescue/relief medications.   The patient is taking the following medications to prevent migraines:  No medications to prevent migraines  In the past 4 weeks, the patient has gone to an Urgent Care or Emergency Room 0 times times due to headaches.    He eats 0-1 servings of fruits and vegetables daily.He consumes 1 sweetened beverage(s) daily.He exercises with enough effort to increase his heart rate 10 to 19 minutes per day.  He is missing 1 dose(s) of medications " "per week.       Metoprolol possible increase his dizziness.   He already struggles with dizziness and he feels like when he takes that medicine his dizziness got worse.  He does not have a blood pressure machine at home.    Also been meeting with diabetic education and on a continuous meter now and has a lot of low sugar alarm readings in the 70s.  He has been better instructed on proper nutrition and regular meals.  He is taking 1 metformin twice a day when he did it takes to twice a day he gets more drops in his sugar levels.  He is interested in Jardiance as this was discussed by the diabetic educator.      Review of Systems  Constitutional, HEENT, cardiovascular, pulmonary, gi and gu systems are negative, except as otherwise noted.      Objective    /77   Pulse 76   Temp 96.9  F (36.1  C) (Tympanic)   Resp 16   Ht 1.727 m (5' 8\")   Wt 105.7 kg (233 lb)   SpO2 100%   BMI 35.43 kg/m    Body mass index is 35.43 kg/m .  Physical Exam   GENERAL: alert and no distress  NECK: no adenopathy, no asymmetry, masses, or scars  RESP: lungs clear to auscultation - no rales, rhonchi or wheezes  CV: regular rate and rhythm, normal S1 S2, no S3 or S4, no murmur, click or rub, no peripheral edema  ABDOMEN: soft, nontender, no hepatosplenomegaly, no masses and bowel sounds normal          Signed Electronically by: Mohit Randall PA-C    DME (Durable Medical Equipment) Orders and Documentation  Orders Placed This Encounter   Procedures    Home Blood Pressure Monitor Order        The patient was assessed and it was determined the patient is in need of the following listed DME Supplies/Equipment. Please complete supporting documentation below to demonstrate medical necessity.         "

## 2024-03-27 ENCOUNTER — TELEPHONE (OUTPATIENT)
Dept: FAMILY MEDICINE | Facility: CLINIC | Age: 59
End: 2024-03-27

## 2024-03-27 ENCOUNTER — ALLIED HEALTH/NURSE VISIT (OUTPATIENT)
Dept: EDUCATION SERVICES | Facility: CLINIC | Age: 59
End: 2024-03-27
Payer: COMMERCIAL

## 2024-03-27 DIAGNOSIS — E11.9 TYPE 2 DIABETES MELLITUS WITHOUT COMPLICATION, WITHOUT LONG-TERM CURRENT USE OF INSULIN (H): ICD-10-CM

## 2024-03-27 PROCEDURE — G0108 DIAB MANAGE TRN  PER INDIV: HCPCS | Mod: AE

## 2024-03-27 RX ORDER — BLOOD-GLUCOSE SENSOR
1 EACH MISCELLANEOUS CONTINUOUS
Qty: 6 EACH | Refills: 11 | Status: SHIPPED | OUTPATIENT
Start: 2024-03-27

## 2024-03-27 NOTE — PROGRESS NOTES
Diabetes Self-Management Education & Support    Presents for: Follow-up    Type of Service: In Person Visit      ASSESSMENT:  Gregg has many great questions on how to stabilize his diabetes for now and the future.  He is eating very healthy and works out quite a bit.  Loves the sensor he is wearing and this has been so helpful for him to learn about certain foods.  And how his body responds to different ways of life he lives.  Working now to stabilize his BP and has jardiance 25 mg to be placed on hold, not really needed for now, but depending on his BP and diabetes A1C may start this in the future    Patient's most recent   Lab Results   Component Value Date    A1C 5.9 12/29/2023    A1C 6.6 06/29/2021     is meeting goal of <7.0    Diabetes knowledge and skills assessment:   Patient is knowledgeable in diabetes management concepts related to: Healthy Eating, Being Active, Monitoring, Taking Medication, Problem Solving, and Healthy Coping    Continue education with the following diabetes management concepts: Healthy Eating, Taking Medication, and Reducing Risks    Based on learning assessment above, most appropriate setting for further diabetes education would be: Individual setting.      PLAN  Metformin  mg take 2 pills with breakfast and 2 pills with dinner.  Remember the sensor tells you where your BG are going in about 20 minutes  Meals having 3-5 carb choices with proteins will keep your BG stable  Excerise can lower your BG , have a bar before exercising to keep from feeling like you are going low.   Exercise is great , keep it from going to long. Enjoy   Hold off Jardiance.  If we need it for BP, then will start 10 mg tablets (break in half the ones you have)  Please make an eye appointment and dental appointment in the next few months.      Topics to cover at upcoming visits: Healthy Eating, Taking Medication, and Reducing Risks    Follow-up:     See Care Plan for co-developed, patient-state behavior  "change goals.  AVS provided for patient today.    Education Materials Provided:  No new materials provided today      SUBJECTIVE/OBJECTIVE:  Presents for: Follow-up  Accompanied by: Self  Diabetes education in the past 24mo: No  Focus of Visit: Taking Medication  Diabetes type: Type 2  Disease course: Improving  Transportation concerns: No  Difficulty affording diabetes medication?: No  Difficulty affording diabetes testing supplies?: No  Other concerns:: None  Cultural Influences/Ethnic Background:  Choose not to answer      Diabetes Symptoms & Complications:  Weight trend: Stable  Symptom course: Stable  Disease course: Improving  Complications assessed today?: No    Patient Problem List and Family Medical History reviewed for relevant medical history, current medical status, and diabetes risk factors.    Vitals:  There were no vitals taken for this visit.  Estimated body mass index is 35.43 kg/m  as calculated from the following:    Height as of 3/22/24: 1.727 m (5' 8\").    Weight as of 3/22/24: 105.7 kg (233 lb).   Last 3 BP:   BP Readings from Last 3 Encounters:   03/22/24 119/77   03/08/24 (!) 144/84   03/04/24 138/84       History   Smoking Status    Never   Smokeless Tobacco    Never       Labs:  Lab Results   Component Value Date    A1C 5.9 12/29/2023    A1C 6.6 06/29/2021     Lab Results   Component Value Date     12/29/2023     08/25/2022     06/29/2021     Lab Results   Component Value Date    LDL 31 03/08/2024    LDL 81 06/29/2021     HDL Cholesterol   Date Value Ref Range Status   06/29/2021 42 >39 mg/dL Final     Direct Measure HDL   Date Value Ref Range Status   03/08/2024 36 (L) >=40 mg/dL Final   ]  GFR Estimate   Date Value Ref Range Status   12/29/2023 77 >60 mL/min/1.73m2 Final   06/29/2021 77 >60 mL/min/[1.73_m2] Final     Comment:     Non  GFR Calc  Starting 12/18/2018, serum creatinine based estimated GFR (eGFR) will be   calculated using the Chronic " "Kidney Disease Epidemiology Collaboration   (CKD-EPI) equation.       GFR Estimate If Black   Date Value Ref Range Status   06/29/2021 89 >60 mL/min/[1.73_m2] Final     Comment:      GFR Calc  Starting 12/18/2018, serum creatinine based estimated GFR (eGFR) will be   calculated using the Chronic Kidney Disease Epidemiology Collaboration   (CKD-EPI) equation.       Lab Results   Component Value Date    CR 1.10 12/29/2023    CR 1.07 06/29/2021     No results found for: \"MICROALBUMIN\"    Healthy Eating:  Healthy Eating Assessed Today: Yes  Cultural/Anabaptism diet restrictions?: No  Do you have any food allergies or intolerances?: No  Meal planning/habits: Smaller portions  Who cooks/prepares meals for you?: Self, Spouse  Who purchases food in  your home?: Spouse, Self  Breakfast: multi grain bagels with salmon cup of yogurt and fruit.  Lunch: bagel and PB and fruit  Dinner: salmon and veggies and cream cheese, to make a sandwich, and chips.  Snacks: cup of Yogurt.  Beverages: Water  Has patient met with a dietitian in the past?: No    Being Active:  Being Active Assessed Today: Yes  Exercise:: Yes  Days per week of moderate to strenuous exercise (like a brisk walk): 4  On average, minutes per day of exercise at this level: 40  How intense was your typical exercise? : Moderate (like brisk walking)  Exercise Minutes per Week: 160  Barrier to exercise: None    Monitoring:  Monitoring Assessed Today: Yes  Did patient bring glucose meter to appointment? : Yes  Blood Glucose Meter: CGM  Times checking blood sugar at home (number): 5+  Times checking blood sugar at home (per): Day  Blood glucose trend: Decreasing            Taking Medications:  Diabetes Medication(s)       Biguanides       metFORMIN (GLUCOPHAGE XR) 500 MG 24 hr tablet Take 1 tablet (500 mg) by mouth 2 times daily (with meals)       Sodium-Glucose Co-Transporter 2 (SGLT2) Inhibitors       empagliflozin (JARDIANCE) 25 MG TABS tablet Take 1 " tablet (25 mg) by mouth daily            Taking Medication Assessed Today: Yes  Current Treatments: Oral Medication (taken by mouth)  Problems taking diabetes medications regularly?: No  Diabetes medication side effects?: No    Problem Solving:  Problem Solving Assessed Today: Yes  Is the patient at risk for hypoglycemia?: No  Is the patient at risk for DKA?: No  Does patient have severe weather/disaster plan for diabetes management?: Not Needed  Does patient have sick day plan for diabetes management?: Yes              Reducing Risks:  Reducing Risks Assessed Today: Yes  Diabetes Risks: Age over 45 years, Family History, Ethnicity, Hypertriglyceridemia  CAD Risks: Hypertension, Male sex, Family history, Diabetes Mellitus  Has dilated eye exam at least once a year?: No  Sees dentist every 6 months?: No  Feet checked by healthcare provider in the last year?: Yes    Healthy Coping:  Healthy Coping Assessed Today: Yes  Emotional response to diabetes: Ready to learn, Acceptance  Informal Support system:: Spouse, Family  Stage of change: ACTION (Actively working towards change)  Patient Activation Measure Survey Score:      11/8/2021     8:22 PM   AUGUSTUS Score (Last Two)   AUGUSTUS Raw Score 31   Activation Score 59.3   AUGUSTUS Level 3         Care Plan and Education Provided:  Care Plan: Diabetes   Updates made by Rosalind Garcia RN since 3/27/2024 12:00 AM        Problem: HbA1C Not In Goal         Goal: Establish Regular Follow-Ups with PCP    Start Date: 3/27/2024   This Visit's Progress: 90%        Goal: Get HbA1C Level in Goal    Start Date: 3/13/2024   This Visit's Progress: 100%        Task: Discuss diabetes treatment plan with patient Completed 3/27/2024   Responsible User: Rosalind Garcia RN        Problem: Diabetes Self-Management Education Needed to Optimize Self-Care Behaviors         Goal: Understand diabetes pathophysiology and disease progression    Start Date: 3/27/2024   This Visit's Progress: 80%        Task:  Provide education on diabetes pathophysiology and disease progression specfic to patient's diabetes type Completed 3/27/2024   Responsible User: Rosalind Garcia RN        Goal: Healthy Eating - follow a healthy eating pattern for diabetes    Start Date: 3/27/2024   This Visit's Progress: 90%   Recent Progress: 40%        Task: Develop individualized healthy eating plan with patient Completed 3/27/2024   Responsible User: Rosalind Garcia RN        Goal: Being Active - get regular physical activity, working up to at least 150 minutes per week    Start Date: 3/27/2024   This Visit's Progress: 90%   Recent Progress: 50%        Task: Develop physical activity plan with patient Completed 3/27/2024   Responsible User: Rosalind Garcia RN        Goal: Monitoring - monitor glucose and ketones as directed    Start Date: 3/27/2024   This Visit's Progress: 100%        Goal: Taking Medication - patient is consistently taking medications as directed    Start Date: 3/13/2024   This Visit's Progress: 100%        Task: Discuss barriers to medication adherence with patient and provide management technique ideas as appropriate Completed 3/27/2024   Responsible User: Rosalind Garcia RN        Task: Provide education on frequency and refill details of medications Completed 3/27/2024   Responsible User: Rosalind Garcia RN        Goal: Problem Solving - know how to prevent and manage short-term diabetes complications    Start Date: 3/27/2024   This Visit's Progress: 90%        Task: Provide education on safe travel with diabetes Completed 3/27/2024   Responsible User: Rosalind Garcia RN        Task: Provide education on when to call a health care provider Completed 3/27/2024   Responsible User: Rosalind Garcia RN        Goal: Reducing Risks - know how to prevent and treat long-term diabetes complications    Start Date: 3/27/2024   This Visit's Progress: 10%        Task: Provide education on recommended care for dental, eye and foot health  Completed 3/27/2024   Responsible User: Rosalind Garcia RN        Task: Provide education on Hemoglobin A1c - goals and relationship to blood glucose levels Completed 3/27/2024   Responsible User: Rosalind Garcia RN        Task: Provide education on recommendations for heart health - lipid levels and goals, blood pressure and goals, and aspirin therapy, if indicated Completed 3/27/2024   Responsible User: Rosalind Garcia RN        Goal: Healthy Coping - use available resources to cope with the challenges of managing diabetes    Start Date: 3/27/2024   This Visit's Progress: 100%   Recent Progress: 90%        Task: Provide education on benefits of utilizing support systems Completed 3/27/2024   Responsible User: Rosalind Garcia RN Sue Truhler RN/LEANNE Smith Diabetes Educator      Time Spent: 60 minutes  Encounter Type: Individual    Any diabetes medication dose changes were made via the CDE Protocol per the patient's primary care provider. A copy of this encounter was shared with the provider.

## 2024-03-27 NOTE — PATIENT INSTRUCTIONS
Diabetes Support Resources:  Metformin  mg take 2 pills with breakfast and 2 pills with dinner.  Remember the sensor tells you where your BG are going in about 20 minutes  Meals having 3-5 carb choices with proteins will keep your BG stable  Excerise can lower your BG , have a bar before exercising to keep from feeling like you are going low.   Exercise is great , keep it from going to long. Enjoy   Hold off Jardiance.  If we need it for BP, then will start 10 mg tablets (break in half the ones you have)  Please make an eye appointment and dental appointment in the next few months.       Bring blood glucose meter and logbook with you to all doctor and follow-up appointments.    Diabetes Education Telephone Visit Follow-up:    We realize your time is valuable and your health is important! We offer a convenient Telephone Visit follow up! It s a quick way to check in for a medication dose adjustment without having to come back to clinic as soon.    Telephone Visits are often covered by insurance. Please check with your insurance plan to see if this type of visit is covered. If not, the cost is less expensive than an office visit:    Up to 10 minutes (Code 76328): $30  11-20 minutes (Code 15255): $59  More than 20 minutes (Code 81801): $85    Talk with your Diabetes Educator if you want to learn more.      [facility]:  For Your Diabetes Education and Nutrition Appointments Call:  [phone]   For Diabetes Education or Nutrition Related Questions:   Phone: [phone]  Send Phizzle Message   If you need a medication refill please contact your pharmacy. Please allow 3 business days for your refills to be completed.

## 2024-03-27 NOTE — LETTER
3/27/2024         RE: Gregg Lua  [id number] [facility] [id number]        Dear Colleague,    Thank you for referring your patient, Gregg Lua, to the [facility]. Please see a copy of my visit note below.    Diabetes Self-Management Education & Support    Presents for: Follow-up    Type of Service: In Person Visit      ASSESSMENT:  Gregg has many great questions on how to stabilize his diabetes for now and the future.  He is eating very healthy and works out quite a bit.  Loves the sensor he is wearing and this has been so helpful for him to learn about certain foods.  And how his body responds to different ways of life he lives.  Working now to stabilize his BP and has jardiance 25 mg to be placed on hold, not really needed for now, but depending on his BP and diabetes A1C may start this in the future    Patient's most recent   Lab Results   Component Value Date    A1C 5.9 12/29/2023    A1C 6.6 06/29/2021     is meeting goal of <7.0    Diabetes knowledge and skills assessment:   Patient is knowledgeable in diabetes management concepts related to: Healthy Eating, Being Active, Monitoring, Taking Medication, Problem Solving, and Healthy Coping    Continue education with the following diabetes management concepts: Healthy Eating, Taking Medication, and Reducing Risks    Based on learning assessment above, most appropriate setting for further diabetes education would be: Individual setting.      PLAN  Metformin  mg take 2 pills with breakfast and 2 pills with dinner.  Remember the sensor tells you where your BG are going in about 20 minutes  Meals having 3-5 carb choices with proteins will keep your BG stable  Excerise can lower your BG , have a bar before exercising to keep from feeling like you are going low.   Exercise is great , keep it from going to long. Enjoy   Hold off Jardiance.  If we need it for BP, then will start 10 mg tablets (break in half the ones you  "have)  Please make an eye appointment and dental appointment in the next few months.      Topics to cover at upcoming visits: Healthy Eating, Taking Medication, and Reducing Risks    Follow-up:     See Care Plan for co-developed, patient-state behavior change goals.  AVS provided for patient today.    Education Materials Provided:  No new materials provided today      SUBJECTIVE/OBJECTIVE:  Presents for: Follow-up  Accompanied by: Self  Diabetes education in the past 24mo: No  Focus of Visit: Taking Medication  Diabetes type: Type 2  Disease course: Improving  Transportation concerns: No  Difficulty affording diabetes medication?: No  Difficulty affording diabetes testing supplies?: No  Other concerns:: None  Cultural Influences/Ethnic Background:  Choose not to answer      Diabetes Symptoms & Complications:  Weight trend: Stable  Symptom course: Stable  Disease course: Improving  Complications assessed today?: No    Patient Problem List and Family Medical History reviewed for relevant medical history, current medical status, and diabetes risk factors.    Vitals:  There were no vitals taken for this visit.  Estimated body mass index is 35.43 kg/m  as calculated from the following:    Height as of 3/22/24: 1.727 m (5' 8\").    Weight as of 3/22/24: 105.7 kg (233 lb).   Last 3 BP:   BP Readings from Last 3 Encounters:   03/22/24 119/77   03/08/24 (!) 144/84   03/04/24 138/84       History   Smoking Status     Never   Smokeless Tobacco     Never       Labs:  Lab Results   Component Value Date    A1C 5.9 12/29/2023    A1C 6.6 06/29/2021     Lab Results   Component Value Date     12/29/2023     08/25/2022     06/29/2021     Lab Results   Component Value Date    LDL 31 03/08/2024    LDL 81 06/29/2021     HDL Cholesterol   Date Value Ref Range Status   06/29/2021 42 >39 mg/dL Final     Direct Measure HDL   Date Value Ref Range Status   03/08/2024 36 (L) >=40 mg/dL Final   ]  GFR Estimate   Date Value " "Ref Range Status   12/29/2023 77 >60 mL/min/1.73m2 Final   06/29/2021 77 >60 mL/min/[1.73_m2] Final     Comment:     Non  GFR Calc  Starting 12/18/2018, serum creatinine based estimated GFR (eGFR) will be   calculated using the Chronic Kidney Disease Epidemiology Collaboration   (CKD-EPI) equation.       GFR Estimate If Black   Date Value Ref Range Status   06/29/2021 89 >60 mL/min/[1.73_m2] Final     Comment:      GFR Calc  Starting 12/18/2018, serum creatinine based estimated GFR (eGFR) will be   calculated using the Chronic Kidney Disease Epidemiology Collaboration   (CKD-EPI) equation.       Lab Results   Component Value Date    CR 1.10 12/29/2023    CR 1.07 06/29/2021     No results found for: \"MICROALBUMIN\"    Healthy Eating:  Healthy Eating Assessed Today: Yes  Cultural/Anabaptism diet restrictions?: No  Do you have any food allergies or intolerances?: No  Meal planning/habits: Smaller portions  Who cooks/prepares meals for you?: Self, Spouse  Who purchases food in  your home?: Spouse, Self  Breakfast: multi grain bagels with salmon cup of yogurt and fruit.  Lunch: bagel and PB and fruit  Dinner: salmon and veggies and cream cheese, to make a sandwich, and chips.  Snacks: cup of Yogurt.  Beverages: Water  Has patient met with a dietitian in the past?: No    Being Active:  Being Active Assessed Today: Yes  Exercise:: Yes  Days per week of moderate to strenuous exercise (like a brisk walk): 4  On average, minutes per day of exercise at this level: 40  How intense was your typical exercise? : Moderate (like brisk walking)  Exercise Minutes per Week: 160  Barrier to exercise: None    Monitoring:  Monitoring Assessed Today: Yes  Did patient bring glucose meter to appointment? : Yes  Blood Glucose Meter: CGM  Times checking blood sugar at home (number): 5+  Times checking blood sugar at home (per): Day  Blood glucose trend: Decreasing            Taking Medications:  Diabetes " Medication(s)       Biguanides       metFORMIN (GLUCOPHAGE XR) 500 MG 24 hr tablet Take 1 tablet (500 mg) by mouth 2 times daily (with meals)       Sodium-Glucose Co-Transporter 2 (SGLT2) Inhibitors       empagliflozin (JARDIANCE) 25 MG TABS tablet Take 1 tablet (25 mg) by mouth daily            Taking Medication Assessed Today: Yes  Current Treatments: Oral Medication (taken by mouth)  Problems taking diabetes medications regularly?: No  Diabetes medication side effects?: No    Problem Solving:  Problem Solving Assessed Today: Yes  Is the patient at risk for hypoglycemia?: No  Is the patient at risk for DKA?: No  Does patient have severe weather/disaster plan for diabetes management?: Not Needed  Does patient have sick day plan for diabetes management?: Yes              Reducing Risks:  Reducing Risks Assessed Today: Yes  Diabetes Risks: Age over 45 years, Family History, Ethnicity, Hypertriglyceridemia  CAD Risks: Hypertension, Male sex, Family history, Diabetes Mellitus  Has dilated eye exam at least once a year?: No  Sees dentist every 6 months?: No  Feet checked by healthcare provider in the last year?: Yes    Healthy Coping:  Healthy Coping Assessed Today: Yes  Emotional response to diabetes: Ready to learn, Acceptance  Informal Support system:: Spouse, Family  Stage of change: ACTION (Actively working towards change)  Patient Activation Measure Survey Score:      11/8/2021     8:22 PM   AUGUSTUS Score (Last Two)   AUGUSTUS Raw Score 31   Activation Score 59.3   AUGUSTUS Level 3         Care Plan and Education Provided:  Care Plan: Diabetes   Updates made by Rosalind Garcia RN since 3/27/2024 12:00 AM        Problem: HbA1C Not In Goal         Goal: Establish Regular Follow-Ups with PCP    Start Date: 3/27/2024   This Visit's Progress: 90%        Goal: Get HbA1C Level in Goal    Start Date: 3/13/2024   This Visit's Progress: 100%        Task: Discuss diabetes treatment plan with patient Completed 3/27/2024   Responsible User:  Rosalind Garcia RN        Problem: Diabetes Self-Management Education Needed to Optimize Self-Care Behaviors         Goal: Understand diabetes pathophysiology and disease progression    Start Date: 3/27/2024   This Visit's Progress: 80%        Task: Provide education on diabetes pathophysiology and disease progression specfic to patient's diabetes type Completed 3/27/2024   Responsible User: Rosalind Garcia RN        Goal: Healthy Eating - follow a healthy eating pattern for diabetes    Start Date: 3/27/2024   This Visit's Progress: 90%   Recent Progress: 40%        Task: Develop individualized healthy eating plan with patient Completed 3/27/2024   Responsible User: Rosalind Garcia RN        Goal: Being Active - get regular physical activity, working up to at least 150 minutes per week    Start Date: 3/27/2024   This Visit's Progress: 90%   Recent Progress: 50%        Task: Develop physical activity plan with patient Completed 3/27/2024   Responsible User: Rosalind Garcia RN        Goal: Monitoring - monitor glucose and ketones as directed    Start Date: 3/27/2024   This Visit's Progress: 100%        Goal: Taking Medication - patient is consistently taking medications as directed    Start Date: 3/13/2024   This Visit's Progress: 100%        Task: Discuss barriers to medication adherence with patient and provide management technique ideas as appropriate Completed 3/27/2024   Responsible User: Rosalind Garcia RN        Task: Provide education on frequency and refill details of medications Completed 3/27/2024   Responsible User: Rosalind Garcia RN        Goal: Problem Solving - know how to prevent and manage short-term diabetes complications    Start Date: 3/27/2024   This Visit's Progress: 90%        Task: Provide education on safe travel with diabetes Completed 3/27/2024   Responsible User: Rosalind Garcia RN        Task: Provide education on when to call a health care provider Completed 3/27/2024   Responsible User:  Rosalind Garcia RN        Goal: Reducing Risks - know how to prevent and treat long-term diabetes complications    Start Date: 3/27/2024   This Visit's Progress: 10%        Task: Provide education on recommended care for dental, eye and foot health Completed 3/27/2024   Responsible User: Rosalind Garcia RN        Task: Provide education on Hemoglobin A1c - goals and relationship to blood glucose levels Completed 3/27/2024   Responsible User: Rosalind Garcia RN        Task: Provide education on recommendations for heart health - lipid levels and goals, blood pressure and goals, and aspirin therapy, if indicated Completed 3/27/2024   Responsible User: Rosalind Garcia RN        Goal: Healthy Coping - use available resources to cope with the challenges of managing diabetes    Start Date: 3/27/2024   This Visit's Progress: 100%   Recent Progress: 90%        Task: Provide education on benefits of utilizing support systems Completed 3/27/2024   Responsible User: Rosalind Garcia, NIDIA Garcia RN/LEANNE Smith Diabetes Educator      Time Spent: 60 minutes  Encounter Type: Individual    Any diabetes medication dose changes were made via the CDE Protocol per the patient's primary care provider. A copy of this encounter was shared with the provider.

## 2024-03-27 NOTE — TELEPHONE ENCOUNTER
----- Message from Rosalind Garcia RN sent at 3/27/2024  2:44 PM CDT -----  Could you put in a RX for Gregg to [place] for a BP cuff for him to .    Thank you    Melanie Garcia RN/LEANNE  [place] Diabetes Educator

## 2024-04-17 NOTE — Clinical Note
Dilator advanced along guidewire   PT missed call from provider and wants a call back regarding medication question

## 2024-04-18 ENCOUNTER — ALLIED HEALTH/NURSE VISIT (OUTPATIENT)
Dept: EDUCATION SERVICES | Facility: CLINIC | Age: 59
End: 2024-04-18
Payer: COMMERCIAL

## 2024-04-18 ENCOUNTER — OFFICE VISIT (OUTPATIENT)
Dept: FAMILY MEDICINE | Facility: CLINIC | Age: 59
End: 2024-04-18
Payer: COMMERCIAL

## 2024-04-18 VITALS
BODY MASS INDEX: 35.46 KG/M2 | WEIGHT: 234 LBS | RESPIRATION RATE: 16 BRPM | HEART RATE: 84 BPM | SYSTOLIC BLOOD PRESSURE: 138 MMHG | TEMPERATURE: 97.4 F | DIASTOLIC BLOOD PRESSURE: 88 MMHG | OXYGEN SATURATION: 100 % | HEIGHT: 68 IN

## 2024-04-18 DIAGNOSIS — E11.9 TYPE 2 DIABETES MELLITUS WITHOUT COMPLICATION, WITHOUT LONG-TERM CURRENT USE OF INSULIN (H): ICD-10-CM

## 2024-04-18 DIAGNOSIS — I10 HYPERTENSION GOAL BP (BLOOD PRESSURE) < 140/90: ICD-10-CM

## 2024-04-18 DIAGNOSIS — E11.9 TYPE 2 DIABETES MELLITUS WITHOUT COMPLICATION, WITHOUT LONG-TERM CURRENT USE OF INSULIN (H): Primary | ICD-10-CM

## 2024-04-18 LAB — HBA1C MFR BLD: 6 % (ref 0–5.6)

## 2024-04-18 PROCEDURE — 36415 COLL VENOUS BLD VENIPUNCTURE: CPT | Performed by: PHYSICIAN ASSISTANT

## 2024-04-18 PROCEDURE — 99213 OFFICE O/P EST LOW 20 MIN: CPT | Performed by: PHYSICIAN ASSISTANT

## 2024-04-18 PROCEDURE — G0108 DIAB MANAGE TRN  PER INDIV: HCPCS | Mod: AE

## 2024-04-18 PROCEDURE — 80048 BASIC METABOLIC PNL TOTAL CA: CPT | Performed by: PHYSICIAN ASSISTANT

## 2024-04-18 PROCEDURE — 83036 HEMOGLOBIN GLYCOSYLATED A1C: CPT | Performed by: PHYSICIAN ASSISTANT

## 2024-04-18 RX ORDER — METFORMIN HCL 500 MG
1000 TABLET, EXTENDED RELEASE 24 HR ORAL 2 TIMES DAILY WITH MEALS
Qty: 360 TABLET | Refills: 1 | Status: SHIPPED | OUTPATIENT
Start: 2024-04-18

## 2024-04-18 RX ORDER — LISINOPRIL 40 MG/1
40 TABLET ORAL DAILY
Qty: 90 TABLET | Refills: 1 | Status: SHIPPED | OUTPATIENT
Start: 2024-04-18 | End: 2024-06-06

## 2024-04-18 ASSESSMENT — PAIN SCALES - GENERAL: PAINLEVEL: NO PAIN (0)

## 2024-04-18 NOTE — PROGRESS NOTES
Assessment & Plan     ,    ICD-10-CM    1. Type 2 diabetes mellitus without complication, without long-term current use of insulin (H)  E11.9 Hemoglobin A1c     Basic metabolic panel  (Ca, Cl, CO2, Creat, Gluc, K, Na, BUN)     empagliflozin (JARDIANCE) 25 MG TABS tablet     Hemoglobin A1c     Basic metabolic panel  (Ca, Cl, CO2, Creat, Gluc, K, Na, BUN)      2. Hypertension goal BP (blood pressure) < 140/90  I10 Basic metabolic panel  (Ca, Cl, CO2, Creat, Gluc, K, Na, BUN)     lisinopril (ZESTRIL) 40 MG tablet     Basic metabolic panel  (Ca, Cl, CO2, Creat, Gluc, K, Na, BUN)        Talk to patient about his concerns were going to increase his lisinopril to 40 mg a day.  Recheck his blood pressure with ancillary service in about 2 weeks.  Will get him in his own blood pressure monitor for home.  We talked about diet and exercise.  Labs are pending.  Follow depend on lab results.  If blood pressure is below 140/90 then recheck again in 6 months.      Liz Escobedo is a 58 year old, presenting for the following health issues:  Hypertension        4/18/2024     1:10 PM   Additional Questions   Roomed by amber   Accompanied by self         4/18/2024     1:10 PM   Patient Reported Additional Medications   Patient reports taking the following new medications no     History of Present Illness       Diabetes:   He presents for follow up of diabetes.  He is checking home blood glucose three times daily.   He checks blood glucose before meals, after meals, before and after meals and at bedtime.  Blood glucose is sometimes over 200 and sometimes under 70. He is aware of hypoglycemia symptoms including shakiness and weakness.    He has no concerns regarding his diabetes at this time.   He is not experiencing numbness or burning in feet, excessive thirst, blurry vision, weight changes or redness, sores or blisters on feet. The patient has not had a diabetic eye exam in the last 12 months.          Hypertension: He presents  "for follow up of hypertension.  He does not check blood pressure  regularly outside of the clinic. Outside blood pressures have been over 140/90. He follows a low salt diet.     Headaches:   Since the patient's last clinic visit, headaches are: no change  The patient is getting headaches:  2  He is not able to do normal daily activities when he has a migraine.  The patient is taking the following rescue/relief medications:  Tylenol   Patient states \"I get some relief\" from the rescue/relief medications.   The patient is taking the following medications to prevent migraines:  No medications to prevent migraines  In the past 4 weeks, the patient has gone to an Urgent Care or Emergency Room 0 times times due to headaches.    He eats 0-1 servings of fruits and vegetables daily.He consumes 1 sweetened beverage(s) daily.He exercises with enough effort to increase his heart rate 20 to 29 minutes per day.  He exercises with enough effort to increase his heart rate 4 days per week.   He is taking medications regularly.  Patient is here for follow-up of blood pressure.  He is just only been on his lisinopril 30 mg.  He stopped his amlodipine as he states he just did not feel right on it.  He has been seen in ENT provider at outside clinic and had a nasal spray treatment done and he states his dizziness has significantly improved.  He is now starting get back into more exercising.  He denies any chest pain or shortness of breath.      Review of Systems  Constitutional, HEENT, cardiovascular, pulmonary, gi and gu systems are negative, except as otherwise noted.      Objective    /88   Pulse 84   Temp 97.4  F (36.3  C) (Tympanic)   Resp 16   Ht 1.727 m (5' 8\")   Wt 106.1 kg (234 lb)   SpO2 100%   BMI 35.58 kg/m    Body mass index is 35.58 kg/m .  Physical Exam   GENERAL: alert and no distress  NECK: no adenopathy, no asymmetry, masses, or scars  RESP: lungs clear to auscultation - no rales, rhonchi or wheezes  CV: " regular rate and rhythm, normal S1 S2, no S3 or S4, no murmur, click or rub, no peripheral edema  ABDOMEN: soft, nontender, no hepatosplenomegaly, no masses and bowel sounds normal  MS: no gross musculoskeletal defects noted, no edema    Labs pending        Signed Electronically by: Mohit Randall PA-C

## 2024-04-18 NOTE — PATIENT INSTRUCTIONS
Diabetes Support Resources:  Restart Jardiance 25 mg , split this in half and take with breakfast and see how your BG are working.  This will help keep your BP good and lower your Blood glucoses after you eat.    Your next goals are to make an appointment to go to the dentist and eye Dr.    Metformin  mg take 2 pills with breakfast and 2 pills with dinner. Max dose is 4/day, and this helps the liver to not send out too much glucose into your blood system  Continue with your exercises.    Follow up with Mohit regarding virtigo  Follow up as you need to make an appointment call [phone]     Bring blood glucose meter and logbook with you to all doctor and follow-up appointments.    Diabetes Education Telephone Visit Follow-up:    We realize your time is valuable and your health is important! We offer a convenient Telephone Visit follow up! It s a quick way to check in for a medication dose adjustment without having to come back to clinic as soon.    Telephone Visits are often covered by insurance. Please check with your insurance plan to see if this type of visit is covered. If not, the cost is less expensive than an office visit:    Up to 10 minutes (Code 76149): $30  11-20 minutes (Code 28407): $59  More than 20 minutes (Code 42772): $85    Talk with your Diabetes Educator if you want to learn more.      [facility]:  For Your Diabetes Education and Nutrition Appointments Call:  [phone]   For Diabetes Education or Nutrition Related Questions:   Phone: [phone]  Send [employer] Message   If you need a medication refill please contact your pharmacy. Please allow 3 business days for your refills to be completed.

## 2024-04-18 NOTE — PROGRESS NOTES
Diabetes Self-Management Education & Support    Presents for: Follow-up    Type of Service: In Person Visit      ASSESSMENT:  Gregg is here, doing well, biggest concern is the vertigo, he is following up with Mohit, I am certain this is not diabetes related.  Will have Jardiance that was held, 25 mg to take 1/2 tablet and see how he does, if stable could take 25 mg dose in the future.  No other concerns    Patient's most recent   Lab Results   Component Value Date    A1C 6.0 04/18/2024    A1C 6.6 06/29/2021     is meeting goal of <7.0    Diabetes knowledge and skills assessment:   Patient is knowledgeable in diabetes management concepts related to: Healthy Eating, Being Active, Monitoring, Taking Medication, Problem Solving, Reducing Risks, and Healthy Coping    Continue education with the following diabetes management concepts: Reducing Risks    Based on learning assessment above, most appropriate setting for further diabetes education would be: Individual setting.      PLAN  Restart Jardiance 25 mg , split this in half and take with breakfast and see how your BG are working.  This will help keep your BP good and lower your Blood glucoses after you eat.    Your next goals are to make an appointment to go to the dentist and eye Dr.    Metformin  mg take 2 pills with breakfast and 2 pills with dinner. Max dose is 4/day, and this helps the liver to not send out too much glucose into your blood system  Continue with your exercises.    Follow up with Mohit regarding virtigo  Follow up as you need to make an appointment call [phone]    Topics to cover at upcoming visits: Reducing Risks    Follow-up:     See Care Plan for co-developed, patient-state behavior change goals.  AVS provided for patient today.    Education Materials Provided:  Living Healthy with Diabetes      SUBJECTIVE/OBJECTIVE:  Presents for: Follow-up  Accompanied by: Self  Diabetes education in the past 24mo: No  Focus of Visit: Taking  "Medication  Diabetes type: Type 2  Disease course: Improving  Transportation concerns: No  Difficulty affording diabetes medication?: No  Difficulty affording diabetes testing supplies?: No  Other concerns:: None  Cultural Influences/Ethnic Background:  Choose not to answer      Diabetes Symptoms & Complications:  Weight trend: Stable  Symptom course: Stable  Disease course: Improving  Complications assessed today?: No    Patient Problem List and Family Medical History reviewed for relevant medical history, current medical status, and diabetes risk factors.    Vitals:  There were no vitals taken for this visit.  Estimated body mass index is 35.58 kg/m  as calculated from the following:    Height as of an earlier encounter on 4/18/24: 1.727 m (5' 8\").    Weight as of an earlier encounter on 4/18/24: 106.1 kg (234 lb).   Last 3 BP:   BP Readings from Last 3 Encounters:   04/18/24 138/88   03/22/24 119/77   03/08/24 (!) 144/84       History   Smoking Status    Never   Smokeless Tobacco    Never       Labs:  Lab Results   Component Value Date    A1C 6.0 04/18/2024    A1C 6.6 06/29/2021     Lab Results   Component Value Date     12/29/2023     08/25/2022     06/29/2021     Lab Results   Component Value Date    LDL 31 03/08/2024    LDL 81 06/29/2021     HDL Cholesterol   Date Value Ref Range Status   06/29/2021 42 >39 mg/dL Final     Direct Measure HDL   Date Value Ref Range Status   03/08/2024 36 (L) >=40 mg/dL Final   ]  GFR Estimate   Date Value Ref Range Status   12/29/2023 77 >60 mL/min/1.73m2 Final   06/29/2021 77 >60 mL/min/[1.73_m2] Final     Comment:     Non  GFR Calc  Starting 12/18/2018, serum creatinine based estimated GFR (eGFR) will be   calculated using the Chronic Kidney Disease Epidemiology Collaboration   (CKD-EPI) equation.       GFR Estimate If Black   Date Value Ref Range Status   06/29/2021 89 >60 mL/min/[1.73_m2] Final     Comment:      GFR " "Calc  Starting 12/18/2018, serum creatinine based estimated GFR (eGFR) will be   calculated using the Chronic Kidney Disease Epidemiology Collaboration   (CKD-EPI) equation.       Lab Results   Component Value Date    CR 1.10 12/29/2023    CR 1.07 06/29/2021     No results found for: \"MICROALBUMIN\"    Healthy Eating:  Healthy Eating Assessed Today: Yes  Cultural/Gnosticism diet restrictions?: No  Do you have any food allergies or intolerances?: No  Meal planning/habits: Smaller portions  Who cooks/prepares meals for you?: Self, Spouse  Who purchases food in  your home?: Spouse, Self  Breakfast: multi grain bagels with salmon cup of yogurt and fruit.  Lunch: bagel and PB and fruit or some chx or steak  Dinner: salmon and veggies and cream cheese, to make a sandwich, and chips.  Snacks: cup of Yogurt.  Other: cottage cheese with some meals  Beverages: Water, Coffee  Has patient met with a dietitian in the past?: No    Being Active:  Being Active Assessed Today: Yes  Exercise:: Yes  Days per week of moderate to strenuous exercise (like a brisk walk): 4  On average, minutes per day of exercise at this level: 40  How intense was your typical exercise? : Moderate (like brisk walking)  Exercise Minutes per Week: 160  Barrier to exercise: None    Monitoring:  Monitoring Assessed Today: Yes  Did patient bring glucose meter to appointment? : Yes  Blood Glucose Meter: CGM  Times checking blood sugar at home (number): 5+  Times checking blood sugar at home (per): Day  Blood glucose trend: Decreasing        Taking Medications:  Diabetes Medication(s)       Biguanides       metFORMIN (GLUCOPHAGE XR) 500 MG 24 hr tablet Take 2 tablets (1,000 mg) by mouth 2 times daily (with meals)       Sodium-Glucose Co-Transporter 2 (SGLT2) Inhibitors       empagliflozin (JARDIANCE) 25 MG TABS tablet Take 0.5 tablets (12.5 mg) by mouth daily            Taking Medication Assessed Today: Yes  Current Treatments: Oral Medication (taken by " mouth)  Problems taking diabetes medications regularly?: No  Diabetes medication side effects?: No    Problem Solving:  Problem Solving Assessed Today: Yes  Is the patient at risk for hypoglycemia?: No  Is the patient at risk for DKA?: No  Does patient have severe weather/disaster plan for diabetes management?: Not Needed  Does patient have sick day plan for diabetes management?: Yes              Reducing Risks:  Reducing Risks Assessed Today: Yes  Diabetes Risks: Age over 45 years, Family History, Ethnicity, Hypertriglyceridemia  CAD Risks: Hypertension, Male sex, Family history, Diabetes Mellitus  Has dilated eye exam at least once a year?: No  Sees dentist every 6 months?: No  Feet checked by healthcare provider in the last year?: Yes    Healthy Coping:  Healthy Coping Assessed Today: Yes  Emotional response to diabetes: Ready to learn, Acceptance  Informal Support system:: Spouse, Family  Stage of change: ACTION (Actively working towards change)  Patient Activation Measure Survey Score:      11/8/2021     8:22 PM   AUGUSTUS Score (Last Two)   AUGUSTUS Raw Score 31   Activation Score 59.3   AUGUSTUS Level 3         Care Plan and Education Provided:      Melanie Garcia RN/LEANNE  [place] Diabetes Educator      Time Spent: 30 minutes  Encounter Type: Individual    Any diabetes medication dose changes were made via the CDE Protocol per the patient's primary care provider. A copy of this encounter was shared with the provider.

## 2024-04-18 NOTE — LETTER
4/18/2024         RE: Gregg Lua  [address] [place] [address]        Dear Colleague,    Thank you for referring your patient, Gregg Lua, to the [facility]. Please see a copy of my visit note below.    Diabetes Self-Management Education & Support    Presents for: Follow-up    Type of Service: In Person Visit      ASSESSMENT:  Gregg is here, doing well, biggest concern is the vertigo, he is following up with Mohit, I am certain this is not diabetes related.  Will have Jardiance that was held, 25 mg to take 1/2 tablet and see how he does, if stable could take 25 mg dose in the future.  No other concerns    Patient's most recent   Lab Results   Component Value Date    A1C 6.0 04/18/2024    A1C 6.6 06/29/2021     is meeting goal of <7.0    Diabetes knowledge and skills assessment:   Patient is knowledgeable in diabetes management concepts related to: Healthy Eating, Being Active, Monitoring, Taking Medication, Problem Solving, Reducing Risks, and Healthy Coping    Continue education with the following diabetes management concepts: Reducing Risks    Based on learning assessment above, most appropriate setting for further diabetes education would be: Individual setting.      PLAN  Restart Jardiance 25 mg , split this in half and take with breakfast and see how your BG are working.  This will help keep your BP good and lower your Blood glucoses after you eat.    Your next goals are to make an appointment to go to the dentist and eye DrGénesis    Metformin  mg take 2 pills with breakfast and 2 pills with dinner. Max dose is 4/day, and this helps the liver to not send out too much glucose into your blood system  Continue with your exercises.    Follow up with Mohit regarding virtigo  Follow up as you need to make an appointment call [phone]    Topics to cover at upcoming visits: Reducing Risks    Follow-up:     See Care Plan for co-developed, patient-state behavior change  "goals.  AVS provided for patient today.    Education Materials Provided:  Living Healthy with Diabetes      SUBJECTIVE/OBJECTIVE:  Presents for: Follow-up  Accompanied by: Self  Diabetes education in the past 24mo: No  Focus of Visit: Taking Medication  Diabetes type: Type 2  Disease course: Improving  Transportation concerns: No  Difficulty affording diabetes medication?: No  Difficulty affording diabetes testing supplies?: No  Other concerns:: None  Cultural Influences/Ethnic Background:  Choose not to answer      Diabetes Symptoms & Complications:  Weight trend: Stable  Symptom course: Stable  Disease course: Improving  Complications assessed today?: No    Patient Problem List and Family Medical History reviewed for relevant medical history, current medical status, and diabetes risk factors.    Vitals:  There were no vitals taken for this visit.  Estimated body mass index is 35.58 kg/m  as calculated from the following:    Height as of an earlier encounter on 4/18/24: 1.727 m (5' 8\").    Weight as of an earlier encounter on 4/18/24: 106.1 kg (234 lb).   Last 3 BP:   BP Readings from Last 3 Encounters:   04/18/24 138/88   03/22/24 119/77   03/08/24 (!) 144/84       History   Smoking Status     Never   Smokeless Tobacco     Never       Labs:  Lab Results   Component Value Date    A1C 6.0 04/18/2024    A1C 6.6 06/29/2021     Lab Results   Component Value Date     12/29/2023     08/25/2022     06/29/2021     Lab Results   Component Value Date    LDL 31 03/08/2024    LDL 81 06/29/2021     HDL Cholesterol   Date Value Ref Range Status   06/29/2021 42 >39 mg/dL Final     Direct Measure HDL   Date Value Ref Range Status   03/08/2024 36 (L) >=40 mg/dL Final   ]  GFR Estimate   Date Value Ref Range Status   12/29/2023 77 >60 mL/min/1.73m2 Final   06/29/2021 77 >60 mL/min/[1.73_m2] Final     Comment:     Non  GFR Calc  Starting 12/18/2018, serum creatinine based estimated GFR (eGFR) will " "be   calculated using the Chronic Kidney Disease Epidemiology Collaboration   (CKD-EPI) equation.       GFR Estimate If Black   Date Value Ref Range Status   06/29/2021 89 >60 mL/min/[1.73_m2] Final     Comment:      GFR Calc  Starting 12/18/2018, serum creatinine based estimated GFR (eGFR) will be   calculated using the Chronic Kidney Disease Epidemiology Collaboration   (CKD-EPI) equation.       Lab Results   Component Value Date    CR 1.10 12/29/2023    CR 1.07 06/29/2021     No results found for: \"MICROALBUMIN\"    Healthy Eating:  Healthy Eating Assessed Today: Yes  Cultural/Quaker diet restrictions?: No  Do you have any food allergies or intolerances?: No  Meal planning/habits: Smaller portions  Who cooks/prepares meals for you?: Self, Spouse  Who purchases food in  your home?: Spouse, Self  Breakfast: multi grain bagels with salmon cup of yogurt and fruit.  Lunch: bagel and PB and fruit or some chx or steak  Dinner: salmon and veggies and cream cheese, to make a sandwich, and chips.  Snacks: cup of Yogurt.  Other: cottage cheese with some meals  Beverages: Water, Coffee  Has patient met with a dietitian in the past?: No    Being Active:  Being Active Assessed Today: Yes  Exercise:: Yes  Days per week of moderate to strenuous exercise (like a brisk walk): 4  On average, minutes per day of exercise at this level: 40  How intense was your typical exercise? : Moderate (like brisk walking)  Exercise Minutes per Week: 160  Barrier to exercise: None    Monitoring:  Monitoring Assessed Today: Yes  Did patient bring glucose meter to appointment? : Yes  Blood Glucose Meter: CGM  Times checking blood sugar at home (number): 5+  Times checking blood sugar at home (per): Day  Blood glucose trend: Decreasing        Taking Medications:  Diabetes Medication(s)       Biguanides       metFORMIN (GLUCOPHAGE XR) 500 MG 24 hr tablet Take 2 tablets (1,000 mg) by mouth 2 times daily (with meals)       " Sodium-Glucose Co-Transporter 2 (SGLT2) Inhibitors       empagliflozin (JARDIANCE) 25 MG TABS tablet Take 0.5 tablets (12.5 mg) by mouth daily            Taking Medication Assessed Today: Yes  Current Treatments: Oral Medication (taken by mouth)  Problems taking diabetes medications regularly?: No  Diabetes medication side effects?: No    Problem Solving:  Problem Solving Assessed Today: Yes  Is the patient at risk for hypoglycemia?: No  Is the patient at risk for DKA?: No  Does patient have severe weather/disaster plan for diabetes management?: Not Needed  Does patient have sick day plan for diabetes management?: Yes              Reducing Risks:  Reducing Risks Assessed Today: Yes  Diabetes Risks: Age over 45 years, Family History, Ethnicity, Hypertriglyceridemia  CAD Risks: Hypertension, Male sex, Family history, Diabetes Mellitus  Has dilated eye exam at least once a year?: No  Sees dentist every 6 months?: No  Feet checked by healthcare provider in the last year?: Yes    Healthy Coping:  Healthy Coping Assessed Today: Yes  Emotional response to diabetes: Ready to learn, Acceptance  Informal Support system:: Spouse, Family  Stage of change: ACTION (Actively working towards change)  Patient Activation Measure Survey Score:      11/8/2021     8:22 PM   AUGUSTUS Score (Last Two)   AUGUSTUS Raw Score 31   Activation Score 59.3   AUGUSTUS Level 3         Care Plan and Education Provided:      Melanie Garcia RN/LEANNE  [place] Diabetes Educator      Time Spent: 30 minutes  Encounter Type: Individual    Any diabetes medication dose changes were made via the CDE Protocol per the patient's primary care provider. A copy of this encounter was shared with the provider.

## 2024-04-20 LAB
ANION GAP SERPL CALCULATED.3IONS-SCNC: 10 MMOL/L (ref 7–15)
BUN SERPL-MCNC: 11.7 MG/DL (ref 8–23)
CALCIUM SERPL-MCNC: 10 MG/DL (ref 8.6–10)
CHLORIDE SERPL-SCNC: 104 MMOL/L (ref 98–107)
CREAT SERPL-MCNC: 1.14 MG/DL (ref 0.67–1.17)
DEPRECATED HCO3 PLAS-SCNC: 26 MMOL/L (ref 22–29)
EGFRCR SERPLBLD CKD-EPI 2021: 74 ML/MIN/1.73M2
GLUCOSE SERPL-MCNC: 105 MG/DL (ref 70–99)
POTASSIUM SERPL-SCNC: 4.5 MMOL/L (ref 3.4–5.3)
SODIUM SERPL-SCNC: 140 MMOL/L (ref 135–145)

## 2024-05-02 ENCOUNTER — ALLIED HEALTH/NURSE VISIT (OUTPATIENT)
Dept: FAMILY MEDICINE | Facility: CLINIC | Age: 59
End: 2024-05-02
Payer: COMMERCIAL

## 2024-05-02 VITALS — HEART RATE: 60 BPM | DIASTOLIC BLOOD PRESSURE: 85 MMHG | SYSTOLIC BLOOD PRESSURE: 138 MMHG | OXYGEN SATURATION: 99 %

## 2024-05-02 DIAGNOSIS — I10 HYPERTENSION GOAL BP (BLOOD PRESSURE) < 140/90: Primary | ICD-10-CM

## 2024-05-02 PROCEDURE — 99207 PR NO CHARGE NURSE ONLY: CPT

## 2024-05-02 NOTE — Clinical Note
Patient would like to know if someone could contact him regarding how long it will take for his dose change to take affect.

## 2024-05-02 NOTE — PROGRESS NOTES
I met with Gregg Lua at the request of Mohit Randall to recheck his blood pressure.  Blood pressure medications on the med list were reviewed with patient.    Patient has taken all medications as per usual regimen: Yes  Patient reports tolerating them without any issues or concerns: Yes    Vitals:    05/02/24 1337   BP: 138/85   Pulse: 60   SpO2: 99%       Blood pressure was taken, previous encounter was reviewed, recorded blood pressure below 140/90.  Patient was discharged and the note will be sent to the provider for final review.      Patient brought home cuff to compare readings.  Home cuff reading was taken immediately following clinic blood pressure check.  Home cuff is a wrist cuff. Initial reading 149/75.  Patient requested to sit and recheck blood pressure on both cuffs again after 10 minutes.     Second reading on home cuff was:  212/88, compared to clinic reading of 140/90.       Patient asked about accuracy of his home cuff.  I spoke with Mohit Randall.  He recommended the patient exchange the cuff for something different as this was a new cuff.      Patient would like to know how long it should take for the dose change in his medication to take affect.    I informed him that I would send his question to Mohit Randall.      Willy Houser, [facility]

## 2024-05-16 ENCOUNTER — MYC MEDICAL ADVICE (OUTPATIENT)
Dept: FAMILY MEDICINE | Facility: CLINIC | Age: 59
End: 2024-05-16
Payer: COMMERCIAL

## 2024-05-16 DIAGNOSIS — I10 HYPERTENSION GOAL BP (BLOOD PRESSURE) < 140/90: Primary | ICD-10-CM

## 2024-05-16 RX ORDER — AMLODIPINE BESYLATE 5 MG/1
5 TABLET ORAL DAILY
Qty: 30 TABLET | Refills: 0 | Status: SHIPPED | OUTPATIENT
Start: 2024-05-16 | End: 2024-06-06

## 2024-06-02 ENCOUNTER — HEALTH MAINTENANCE LETTER (OUTPATIENT)
Age: 59
End: 2024-06-02

## 2024-06-05 NOTE — PROGRESS NOTES
[facility]/[place]  Section of General Neurology  New Patient Visit      Gregg Lua MRN# [id number]   Age: 58 year old YOB: 1965              Assessment and Plan:   Assessment:  Gregg Lua is a very pleasant 58 year old male who presents today for evaluation of vertigo.  It has been ongoing since his 30s but has worsened over the last 3-4 years.  To review I think his CTA and MRI are unrevealing for cause, with some non specific T2 hyperintensities that likely correspond with metabolic risk factors.  Discussed options long standing non specific dizziness.  I do not see much in the way of migrainous confounding variables.  Sometimes trialing selective serotonin reuptake inhibitors or SNRIs can be trialed empirically.  Discussed this is a common symptom that can be difficult to treat that some people are prone to without something we can clearly point to a particular cause or reason.      Plan:  Agree with meclizine PRN, side effects discusssed  [facility]  MRI brain/CTA H/N reviewed, non contributory   Can consider empiric selective serotonin reuptake inhibitor/SNRI as above potentially  Will check in this fall to see how he is doing.       Zackery Owens MD   of Neurology   [facility]      History of Presenting Symptoms:   Gregg Lua is a 58 year old male who presents today for evaluation of vertigo     In his mid 30s he was dx with non specific vertigo.   Now has type II diabetes, meds are doing well.    Vertigo still came and went over the yesars.   More pronounced over the last 4 years  Wears his CPA/does have DANIELLE.   Meclizine--he is going to try this.      Dizziness specific questions:  Use a word other than dizziness to describe the sensation:  Lightheadedness/presyncopal feeling?--more like feeling on a boat  Duration of episodes? (less than 1 minute can be suggestive of BPPV)--can be a full  day  No big migraine history but rare  Photophobia, nausea or visual changes with the dizziness? no  History of mood disorder (panic disorder, consideration of chronic subjective dizziness/persistent postural perceptual dizziness)--not clearly, no depression.        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        ED note prompting referral (12/2023) reviewed  Gregg Lua will be discharged via Cab to home    Patient Verbalized understanding of discharge instructions including medication administration and recommended follow up care as noted on discharge instructions. Written discharge instructions given, denies any further questions. Prescriptions: were sent to pharmacy. . Barriers to learning identified and addressed: None observed  Electronically signed by Ede Marcelo RN at 12/18/2023 2:09 AM CST   Back to top of Miscellaneous Notes  ED Provider Note - Fabricio Be,  - 12/18/2023 2:02 AM CST  Formatting of this note might be different from the original.  Signed out from Dr Balderrama. No acute ischemic process on MRI. Findings do not correlate to finding on CT scan. There are nonspecific white matter changes with the differential listed. He is a diabetic with some hypertensive history appearing today. I do not currently suspect Lyme disease. Demyelinating seems less likely given history of vertigo and episodic vertigo today. He reported to me that it feels like his previous vertigo. We road tested him and he was able to ambulate. He is not having any chest pain or shortness of breath and does not sound like an anginal equivalent. I placed a referral for [facility] in [place] regarding the MRI findings to make sure this does not get lost to follow-up as well as a referral to [facility] for vestibular evaluation and treatment. Return precautions discussed and provided.    Past Medical History:     Patient Active Problem List    Diagnosis    Type 2 diabetes mellitus without complication, without long-term current use of insulin (H)    Hypertension goal BP (blood pressure) < 140/90    Obesity (BMI 35.0-39.9) with comorbidity (H)    DANIELLE (obstructive sleep apnea)    Seasonal allergic rhinitis, unspecified trigger    FH: MI (myocardial infarction)    High cholesterol     Past Medical History:   Diagnosis Date    Diabetes mellitus, type 2 (H)     High cholesterol     Hypertension         Past Surgical History:     Past Surgical History:   Procedure Laterality Date    GALLBLADDER SURGERY          Social History:     Social History     Tobacco Use    Smoking status: Never    Smokeless tobacco: Never   Substance Use Topics    Alcohol use: No    Drug use: No        Family History:     Family History   Problem Relation Age of Onset    Diabetes Mother     Eye Surgery Mother         cataracts    Heart Disease Father         age 36 MI .     Diabetes Brother     Diabetes Sister     Diabetes Sister     Diabetes Brother     Macular Degeneration No family hx of         Medications:     Current Outpatient Medications   Medication Sig Dispense Refill    amLODIPine (NORVASC) 5 MG tablet Take 1 tablet (5 mg) by mouth daily 30 tablet 0    atorvastatin (LIPITOR) 40 MG tablet Take 0.5 tablets (20 mg) by mouth daily      Continuous Blood Gluc Sensor (FREESTYLE ELODIA 3 SENSOR) MISC 1 Box continuous 6 each 11    Continuous Blood Gluc Sensor (FREESTYLE ELODIA 3 SENSOR) MISC 1 Box continuous 1 each 0    cyclobenzaprine (FLEXERIL) 5 MG tablet TAKE 1 TO 2 TABLETS BY MOUTH THREE TIMES DAILY AS NEEDED FOR MUSCLE SPASM OR PAIN. PREFERABLY TAKE AT BEDTIME 60 tablet 0    empagliflozin (JARDIANCE) 25 MG TABS tablet Take 0.5 tablets (12.5 mg) by mouth daily      fluticasone (FLONASE) 50 MCG/ACT nasal spray Spray 1-2 sprays into both nostrils daily 1 g 11    hydrOXYzine HCl (ATARAX) 25 MG tablet Take 1 tablet (25 mg) by mouth 3 times daily as needed for itching 60 tablet 0     lisinopril (ZESTRIL) 40 MG tablet Take 1 tablet (40 mg) by mouth daily 90 tablet 1    meclizine (ANTIVERT) 25 MG tablet Take 25-50 mg by mouth      metFORMIN (GLUCOPHAGE XR) 500 MG 24 hr tablet Take 2 tablets (1,000 mg) by mouth 2 times daily (with meals) 360 tablet 1    methocarbamol (ROBAXIN) 750 MG tablet Take 1 tablet (750 mg) by mouth 3 times daily 30 tablet 0     No current facility-administered medications for this visit.        Allergies:     Allergies   Allergen Reactions    Seasonal Allergies Other (See Comments) and Headache     whoozy        Review of Systems:   As noted above     Physical Exam:   Vitals: /82 (BP Location: Right arm, Patient Position: Sitting, Cuff Size: Adult Large)   Pulse 86   Wt 106.6 kg (235 lb)   BMI 35.73 kg/m       Neuro:   General Appearance: No apparent distress, well-nourished, well-groomed, pleasant     Mental Status: Alert and oriented to person, place, and time. Speech fluent and comprehension intact. No dysarthria.    Cranial Nerves:   II: Visual fields: normal  III: Pupils: 3 mm, equal, round, reactive to light   III,IV,VI: Extraocular Movements: intact   V: Facial sensation: intact to light touch  VII: Facial strength: intact without asymmetry  VIII: Hearing: intact grossly  IX: Palate: intact  XI: Shoulder shrug: intact  XII: Tongue movement: normal     Motor Exam:   5/5 Diffusely    No drift is present. No abnormal movements. Tone is normal throughout.    Sensory: intact to light touch    Coordination: no dysmetria with finger-to-nose bilaterally    Reflexes: biceps, triceps, brachioradialis, patellar, and ankle jerks 1+ and symmetric.          Data: Pertinent prior to visit   Imaging:  MRI brain 12/2023   1.  No acute intracranial process.   2.  Mild to moderate scattered nonspecific foci of T2/FLAIR hyperintense signal in the cerebral white matter. These findings are most often seen in the setting of chronic microangiopathic ischemic change. Other  differential diagnostic considerations include a demyelinating process, autoimmune vasculitis, Lyme disease, or even chronic migraine effect, among other etiologies.   3.  Right-sided mastoid effusion.     CT/CTA 2023    HEAD CT:   1.  No acute intracranial hemorrhage.   2.  While partially obscured by streak artifact from the skull base, there appears to be an asymmetric area of hypoattenuation in the left cerebellar hemisphere, which raises concern for possible age-indeterminate infarct. No substantial mass effect. Brain MRI is recommended for further evaluation.   3.  Additional chronic changes as detailed above.     HEAD CTA:   1.  No high-grade stenosis or occlusion involving the major intracranial arteries.   2.  No aneurysm or evidence of high flow vascular malformation.   3.  Variant anatomy as detailed above.     NECK CTA:   1.  No high-grade stenosis or occlusion involving the major arteries of the neck.   2.  No evidence of dissection.   3.  Multilevel degenerative changes of the cervical spine with moderate to advanced spinal canal stenosis at C4-C5 and C5-C6.          I personally reviewed the above images and reports.  The imaging represents to me unrevealing MRI brain. T2 hyperintensities non specific                The total time of this encounter today amounted to 60 minutes. This time included time spent with the patient, prep work, ordering tests and performing post visit documentation.

## 2024-06-06 ENCOUNTER — MEDICAL CORRESPONDENCE (OUTPATIENT)
Dept: HEALTH INFORMATION MANAGEMENT | Facility: CLINIC | Age: 59
End: 2024-06-06

## 2024-06-06 ENCOUNTER — TELEPHONE (OUTPATIENT)
Dept: NEUROLOGY | Facility: CLINIC | Age: 59
End: 2024-06-06

## 2024-06-06 ENCOUNTER — OFFICE VISIT (OUTPATIENT)
Dept: NEUROLOGY | Facility: CLINIC | Age: 59
End: 2024-06-06
Attending: PHYSICIAN ASSISTANT
Payer: COMMERCIAL

## 2024-06-06 ENCOUNTER — OFFICE VISIT (OUTPATIENT)
Dept: FAMILY MEDICINE | Facility: CLINIC | Age: 59
End: 2024-06-06
Payer: COMMERCIAL

## 2024-06-06 VITALS
SYSTOLIC BLOOD PRESSURE: 123 MMHG | HEART RATE: 86 BPM | WEIGHT: 235 LBS | BODY MASS INDEX: 35.73 KG/M2 | DIASTOLIC BLOOD PRESSURE: 82 MMHG

## 2024-06-06 VITALS
OXYGEN SATURATION: 100 % | SYSTOLIC BLOOD PRESSURE: 128 MMHG | TEMPERATURE: 97 F | BODY MASS INDEX: 35.61 KG/M2 | WEIGHT: 235 LBS | DIASTOLIC BLOOD PRESSURE: 85 MMHG | HEIGHT: 68 IN | HEART RATE: 87 BPM | RESPIRATION RATE: 16 BRPM

## 2024-06-06 DIAGNOSIS — I10 HYPERTENSION GOAL BP (BLOOD PRESSURE) < 140/90: ICD-10-CM

## 2024-06-06 DIAGNOSIS — R42 VERTIGO: ICD-10-CM

## 2024-06-06 DIAGNOSIS — E11.9 TYPE 2 DIABETES MELLITUS WITHOUT COMPLICATION, WITHOUT LONG-TERM CURRENT USE OF INSULIN (H): Primary | ICD-10-CM

## 2024-06-06 DIAGNOSIS — E78.00 HIGH CHOLESTEROL: ICD-10-CM

## 2024-06-06 LAB
ANION GAP SERPL CALCULATED.3IONS-SCNC: 10 MMOL/L (ref 7–15)
BUN SERPL-MCNC: 14.4 MG/DL (ref 8–23)
CALCIUM SERPL-MCNC: 9.6 MG/DL (ref 8.6–10)
CHLORIDE SERPL-SCNC: 102 MMOL/L (ref 98–107)
CHOLEST SERPL-MCNC: 129 MG/DL
CREAT SERPL-MCNC: 1.23 MG/DL (ref 0.67–1.17)
DEPRECATED HCO3 PLAS-SCNC: 27 MMOL/L (ref 22–29)
EGFRCR SERPLBLD CKD-EPI 2021: 68 ML/MIN/1.73M2
FASTING STATUS PATIENT QL REPORTED: YES
FASTING STATUS PATIENT QL REPORTED: YES
GLUCOSE SERPL-MCNC: 135 MG/DL (ref 70–99)
HDLC SERPL-MCNC: 39 MG/DL
LDLC SERPL CALC-MCNC: 22 MG/DL
NONHDLC SERPL-MCNC: 90 MG/DL
POTASSIUM SERPL-SCNC: 4.5 MMOL/L (ref 3.4–5.3)
SODIUM SERPL-SCNC: 139 MMOL/L (ref 135–145)
TRIGL SERPL-MCNC: 341 MG/DL

## 2024-06-06 PROCEDURE — 80048 BASIC METABOLIC PNL TOTAL CA: CPT | Performed by: PHYSICIAN ASSISTANT

## 2024-06-06 PROCEDURE — G2211 COMPLEX E/M VISIT ADD ON: HCPCS | Performed by: PHYSICIAN ASSISTANT

## 2024-06-06 PROCEDURE — 36415 COLL VENOUS BLD VENIPUNCTURE: CPT | Performed by: PHYSICIAN ASSISTANT

## 2024-06-06 PROCEDURE — 99205 OFFICE O/P NEW HI 60 MIN: CPT | Performed by: STUDENT IN AN ORGANIZED HEALTH CARE EDUCATION/TRAINING PROGRAM

## 2024-06-06 PROCEDURE — 99214 OFFICE O/P EST MOD 30 MIN: CPT | Performed by: PHYSICIAN ASSISTANT

## 2024-06-06 PROCEDURE — 80061 LIPID PANEL: CPT | Performed by: PHYSICIAN ASSISTANT

## 2024-06-06 RX ORDER — MECLIZINE HYDROCHLORIDE 25 MG/1
25-50 TABLET ORAL 3 TIMES DAILY PRN
Qty: 90 TABLET | Refills: 0 | Status: SHIPPED | OUTPATIENT
Start: 2024-06-06 | End: 2024-09-04

## 2024-06-06 RX ORDER — AMLODIPINE BESYLATE 10 MG/1
10 TABLET ORAL DAILY
Qty: 90 TABLET | Refills: 1 | Status: SHIPPED | OUTPATIENT
Start: 2024-06-06

## 2024-06-06 RX ORDER — LISINOPRIL 20 MG/1
20 TABLET ORAL DAILY
Qty: 90 TABLET | Refills: 1 | Status: SHIPPED | OUTPATIENT
Start: 2024-06-06

## 2024-06-06 ASSESSMENT — PAIN SCALES - GENERAL: PAINLEVEL: NO PAIN (0)

## 2024-06-06 NOTE — TELEPHONE ENCOUNTER
Writer faxed Face sheet, OV note, and application to the [facility].  Faxed to [phone]  Verified delivered by right fax.  BAILEE Pope, SHANNA ([facility])

## 2024-06-06 NOTE — TELEPHONE ENCOUNTER
Neuro team called [facility] and requested Images be pushed to PACs .   Images opened by LPN and provider notified.  Provider thanked staff and continued visit with Patient.  AMELIA Pope., SHANNA ([facility])

## 2024-06-06 NOTE — LETTER
6/6/2024      Gregg Lua  [address] [address] [address]      Dear Colleague,    Thank you for referring your patient, Gregg Lua, to the [facility] [place]. Please see a copy of my visit note below.    [facility]  Section of General Neurology  New Patient Visit      Gregg Lua MRN# [id number]   Age: 58 year old YOB: 1965              Assessment and Plan:   Assessment:  Gregg Lua is a very pleasant 58 year old male who presents today for evaluation of vertigo.  It has been ongoing since his 30s but has worsened over the last 3-4 years.  To review I think his CTA and MRI are unrevealing for cause, with some non specific T2 hyperintensities that likely correspond with metabolic risk factors.  Discussed options long standing non specific dizziness.  I do not see much in the way of migrainous confounding variables.  Sometimes trialing selective serotonin reuptake inhibitors or SNRIs can be trialed empirically.  Discussed this is a common symptom that can be difficult to treat that some people are prone to without something we can clearly point to a particular cause or reason.      Plan:  Agree with meclizine PRN, side effects discusssed  [facility]  MRI brain/CTA H/N reviewed, non contributory   Can consider empiric selective serotonin reuptake inhibitor/SNRI as above potentially  Will check in this fall to see how he is doing.       Zackery Owens MD   of Neurology   [facility]      History of Presenting Symptoms:   Gregg Lua is a 58 year old male who presents today for evaluation of vertigo     In his mid 30s he was dx with non specific vertigo.   Now has type II diabetes, meds are doing well.    Vertigo still came and went over the yesars.   More pronounced over the last 4 years  Wears his CPA/does have DANIELLE.   Meclizine--he is going to try this.       Dizziness specific questions:  Use a word other than dizziness to describe the sensation:  Lightheadedness/presyncopal feeling?--more like feeling on a boat  Duration of episodes? (less than 1 minute can be suggestive of BPPV)--can be a full day  No big migraine history but rare  Photophobia, nausea or visual changes with the dizziness? no  History of mood disorder (panic disorder, consideration of chronic subjective dizziness/persistent postural perceptual dizziness)--not clearly, no depression.        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        ED note prompting referral (12/2023) reviewed  Gregg Lua will be discharged via Cab to home    Patient Verbalized understanding of discharge instructions including medication administration and recommended follow up care as noted on discharge instructions. Written discharge instructions given, denies any further questions. Prescriptions: were sent to pharmacy. . Barriers to learning identified and addressed: None observed  Electronically signed by Ede Marcelo RN at 12/18/2023 2:09 AM CST   Back to top of Miscellaneous Notes  ED Provider Note - Fabricio Be,  - 12/18/2023 2:02 AM CST  Formatting of this note might be different from the original.  Signed out from Dr Balderrama. No acute ischemic process on MRI. Findings do not correlate to finding on CT scan. There are nonspecific white matter changes with the differential listed. He is a diabetic with some hypertensive history appearing today. I do not currently suspect Lyme disease. Demyelinating seems less likely given history of vertigo and episodic vertigo today. He reported to me that it feels like his previous vertigo. We road tested him and he was able to ambulate. He is not having any chest pain or shortness of breath and does not sound like an anginal equivalent. I placed a referral for [facility] in [place] regarding the MRI findings to make  sure this does not get lost to follow-up as well as a referral to [facility] for vestibular evaluation and treatment. Return precautions discussed and provided.    Past Medical History:     Patient Active Problem List   Diagnosis     Type 2 diabetes mellitus without complication, without long-term current use of insulin (H)     Hypertension goal BP (blood pressure) < 140/90     Obesity (BMI 35.0-39.9) with comorbidity (H)     DANIELLE (obstructive sleep apnea)     Seasonal allergic rhinitis, unspecified trigger     FH: MI (myocardial infarction)     High cholesterol     Past Medical History:   Diagnosis Date     Diabetes mellitus, type 2 (H)      High cholesterol      Hypertension         Past Surgical History:     Past Surgical History:   Procedure Laterality Date     GALLBLADDER SURGERY          Social History:     Social History     Tobacco Use     Smoking status: Never     Smokeless tobacco: Never   Substance Use Topics     Alcohol use: No     Drug use: No        Family History:     Family History   Problem Relation Age of Onset     Diabetes Mother      Eye Surgery Mother         cataracts     Heart Disease Father         age 36 MI .      Diabetes Brother      Diabetes Sister      Diabetes Sister      Diabetes Brother      Macular Degeneration No family hx of         Medications:     Current Outpatient Medications   Medication Sig Dispense Refill     amLODIPine (NORVASC) 5 MG tablet Take 1 tablet (5 mg) by mouth daily 30 tablet 0     atorvastatin (LIPITOR) 40 MG tablet Take 0.5 tablets (20 mg) by mouth daily       Continuous Blood Gluc Sensor (FREESTYLE ELODIA 3 SENSOR) MISC 1 Box continuous 6 each 11     Continuous Blood Gluc Sensor (FREESTYLE ELODIA 3 SENSOR) MISC 1 Box continuous 1 each 0     cyclobenzaprine (FLEXERIL) 5 MG tablet TAKE 1 TO 2 TABLETS BY MOUTH THREE TIMES DAILY AS NEEDED FOR MUSCLE SPASM OR PAIN. PREFERABLY TAKE AT BEDTIME 60 tablet 0     empagliflozin (JARDIANCE) 25 MG TABS  tablet Take 0.5 tablets (12.5 mg) by mouth daily       fluticasone (FLONASE) 50 MCG/ACT nasal spray Spray 1-2 sprays into both nostrils daily 1 g 11     hydrOXYzine HCl (ATARAX) 25 MG tablet Take 1 tablet (25 mg) by mouth 3 times daily as needed for itching 60 tablet 0     lisinopril (ZESTRIL) 40 MG tablet Take 1 tablet (40 mg) by mouth daily 90 tablet 1     meclizine (ANTIVERT) 25 MG tablet Take 25-50 mg by mouth       metFORMIN (GLUCOPHAGE XR) 500 MG 24 hr tablet Take 2 tablets (1,000 mg) by mouth 2 times daily (with meals) 360 tablet 1     methocarbamol (ROBAXIN) 750 MG tablet Take 1 tablet (750 mg) by mouth 3 times daily 30 tablet 0     No current facility-administered medications for this visit.        Allergies:     Allergies   Allergen Reactions     Seasonal Allergies Other (See Comments) and Headache     whoozy        Review of Systems:   As noted above     Physical Exam:   Vitals: /82 (BP Location: Right arm, Patient Position: Sitting, Cuff Size: Adult Large)   Pulse 86   Wt 106.6 kg (235 lb)   BMI 35.73 kg/m       Neuro:   General Appearance: No apparent distress, well-nourished, well-groomed, pleasant     Mental Status: Alert and oriented to person, place, and time. Speech fluent and comprehension intact. No dysarthria.    Cranial Nerves:   II: Visual fields: normal  III: Pupils: 3 mm, equal, round, reactive to light   III,IV,VI: Extraocular Movements: intact   V: Facial sensation: intact to light touch  VII: Facial strength: intact without asymmetry  VIII: Hearing: intact grossly  IX: Palate: intact  XI: Shoulder shrug: intact  XII: Tongue movement: normal     Motor Exam:   5/5 Diffusely    No drift is present. No abnormal movements. Tone is normal throughout.    Sensory: intact to light touch    Coordination: no dysmetria with finger-to-nose bilaterally    Reflexes: biceps, triceps, brachioradialis, patellar, and ankle jerks 1+ and symmetric.          Data: Pertinent prior to visit    Imaging:  MRI brain 12/2023   1.  No acute intracranial process.   2.  Mild to moderate scattered nonspecific foci of T2/FLAIR hyperintense signal in the cerebral white matter. These findings are most often seen in the setting of chronic microangiopathic ischemic change. Other differential diagnostic considerations include a demyelinating process, autoimmune vasculitis, Lyme disease, or even chronic migraine effect, among other etiologies.   3.  Right-sided mastoid effusion.     CT/CTA 2023    HEAD CT:   1.  No acute intracranial hemorrhage.   2.  While partially obscured by streak artifact from the skull base, there appears to be an asymmetric area of hypoattenuation in the left cerebellar hemisphere, which raises concern for possible age-indeterminate infarct. No substantial mass effect. Brain MRI is recommended for further evaluation.   3.  Additional chronic changes as detailed above.     HEAD CTA:   1.  No high-grade stenosis or occlusion involving the major intracranial arteries.   2.  No aneurysm or evidence of high flow vascular malformation.   3.  Variant anatomy as detailed above.     NECK CTA:   1.  No high-grade stenosis or occlusion involving the major arteries of the neck.   2.  No evidence of dissection.   3.  Multilevel degenerative changes of the cervical spine with moderate to advanced spinal canal stenosis at C4-C5 and C5-C6.          I personally reviewed the above images and reports.  The imaging represents to me unrevealing MRI brain. T2 hyperintensities non specific                The total time of this encounter today amounted to 60 minutes. This time included time spent with the patient, prep work, ordering tests and performing post visit documentation.        Again, thank you for allowing me to participate in the care of your patient.        Sincerely,        Vivek Owens MD

## 2024-06-06 NOTE — PROGRESS NOTES
Assessment & Plan       ICD-10-CM    1. Type 2 diabetes mellitus without complication, without long-term current use of insulin (H)  E11.9 Basic metabolic panel  (Ca, Cl, CO2, Creat, Gluc, K, Na, BUN)     Basic metabolic panel  (Ca, Cl, CO2, Creat, Gluc, K, Na, BUN)      2. Hypertension goal BP (blood pressure) < 140/90  I10 Basic metabolic panel  (Ca, Cl, CO2, Creat, Gluc, K, Na, BUN)     amLODIPine (NORVASC) 10 MG tablet     lisinopril (ZESTRIL) 20 MG tablet     Basic metabolic panel  (Ca, Cl, CO2, Creat, Gluc, K, Na, BUN)      3. High cholesterol  E78.00 Lipid panel reflex to direct LDL Fasting     Lipid panel reflex to direct LDL Fasting      4. Vertigo  R42 meclizine (ANTIVERT) 25 MG tablet      Talk to patient about his concerns medical conditions are stable okay for refills.  I advised that he try taking his medications before bed to see if that helps some of his symptoms.  Recheck in a month as needed otherwise again in 6 months.  Warning signs side effects were discussed.    The longitudinal plan of care for the diagnosis(es)/condition(s) as documented were addressed during this visit. Due to the added complexity in care, I will continue to support Gregg in the subsequent management and with ongoing continuity of care.  Subjective   Gregg is a 58 year old, presenting for the following health issues:  Hypertension        6/6/2024     7:35 AM   Additional Questions   Roomed by Manny GOMEZ MA     History of Present Illness       Hypertension: He presents for follow up of hypertension.  He does not check blood pressure  regularly outside of the clinic. Outpatient blood pressures have not been over 140/90. He follows a low salt diet.     He eats 2-3 servings of fruits and vegetables daily.He consumes 1 sweetened beverage(s) daily.He exercises with enough effort to increase his heart rate 20 to 29 minutes per day.  He exercises with enough effort to increase his heart rate 3 or less days per week.   He is taking  "medications regularly.  Patient and his here for recheck of his blood pressure.  He has a history of high cholesterol diabetes.  He states that he has been struggling with vertigo and has appoint with neurology today.  He has noticed that his vertigo symptoms are worse after taking his blood pressure medications in the morning.  He denies any chest pain or shortness of breath.  He has been working with diabetic education for better nutrition tips and tricks.    Review of Systems  Constitutional, HEENT, cardiovascular, pulmonary, gi and gu systems are negative, except as otherwise noted.      Objective    /85   Pulse 87   Temp 97  F (36.1  C) (Tympanic)   Resp 16   Ht 1.727 m (5' 8\")   Wt 106.6 kg (235 lb)   SpO2 100%   BMI 35.73 kg/m    Body mass index is 35.73 kg/m .  Physical Exam   GENERAL: alert and no distress  RESP: lungs clear to auscultation - no rales, rhonchi or wheezes  CV: regular rate and rhythm, normal S1 S2, no S3 or S4, no murmur, click or rub, no peripheral edema  MS: no gross musculoskeletal defects noted, no edema    Labs pendinng        Signed Electronically by: Mohit Randall PA-C    "

## 2024-06-07 RX ORDER — ATORVASTATIN CALCIUM 10 MG/1
10 TABLET, FILM COATED ORAL DAILY
Qty: 90 TABLET | Refills: 1 | Status: SHIPPED | OUTPATIENT
Start: 2024-06-07

## 2024-06-07 NOTE — RESULT ENCOUNTER NOTE
Mr. Lua,    All of your labs were normal/near normal for you.  Your bad cholesterol is actually to low. A Prescription was sent to the pharmacy for lipitor 10mg and recheck labs in 2 months.   Please contact the clinic if you have additional questions.  Thank you.    Sincerely,    Mohit Randall PA-C

## 2024-08-15 ENCOUNTER — TELEPHONE (OUTPATIENT)
Dept: FAMILY MEDICINE | Facility: CLINIC | Age: 59
End: 2024-08-15
Payer: COMMERCIAL

## 2024-08-15 NOTE — TELEPHONE ENCOUNTER
His diabetes and HTN should keep him from working.   The vertigo has been managed by ENT and neurology.   He should reach out to them for letter.     Thanks  Mohit Randall PA-C

## 2024-08-15 NOTE — TELEPHONE ENCOUNTER
Forms/Letter Request    Type of form/letter: Medical opinion  -  hypertension, vertigo & type 2 diabetes is affecting patient from [facility] child support [facility] is requesting a letter from patients provider.     Do you need an appt for this?     Do we have the form/letter: No patient is requesting     Who is the form from? Patient    When is form/letter needed by: asap    How would you like the form/letter returned: Mail  Is this the correct address?: Yes  [id number] [phone]    Patient Notified form requests are processed in 5-7 business days:Yes    Could we send this information to you in [employer] or would you prefer to receive a phone call?:   Patient would prefer a phone call   Okay to leave a detailed message?: Yes at Home number on file [phone] (home)     Landy NAJERA,    [facility]

## 2024-10-16 ENCOUNTER — OFFICE VISIT (OUTPATIENT)
Dept: NEUROLOGY | Facility: CLINIC | Age: 59
End: 2024-10-16
Payer: COMMERCIAL

## 2024-10-16 VITALS
DIASTOLIC BLOOD PRESSURE: 85 MMHG | HEIGHT: 68 IN | HEART RATE: 98 BPM | BODY MASS INDEX: 36.22 KG/M2 | WEIGHT: 239 LBS | SYSTOLIC BLOOD PRESSURE: 136 MMHG

## 2024-10-16 DIAGNOSIS — R42 CHRONIC VERTIGO: ICD-10-CM

## 2024-10-16 DIAGNOSIS — R26.89 BALANCE PROBLEMS: Primary | ICD-10-CM

## 2024-10-16 DIAGNOSIS — R42 VERTIGO: ICD-10-CM

## 2024-10-16 PROCEDURE — 99214 OFFICE O/P EST MOD 30 MIN: CPT | Performed by: STUDENT IN AN ORGANIZED HEALTH CARE EDUCATION/TRAINING PROGRAM

## 2024-10-16 RX ORDER — SERTRALINE HYDROCHLORIDE 25 MG/1
25 TABLET, FILM COATED ORAL DAILY
Qty: 30 TABLET | Refills: 0 | Status: SHIPPED | OUTPATIENT
Start: 2024-10-16

## 2024-10-16 NOTE — PROGRESS NOTES
[facility]/[place]  Section of General Neurology  Return Patient Visit    Gregg Lua MRN# [id number]   Age: 59 year old YOB: 1965          Assessment and Plan:   Assessment:  Gregg Lua is a very pleasant 59 year old male who presents today in follow up for non specific vertigo.  Meclizine does not help.  MRI/CTA WNL.  He has been worked up with unrevealing data in the past as well.  He usually worked 70-80 hour weeks in the past but doesn't think he is capable of this anymore. I think that is reasonable given his limitations/that he go down to more conventional work hours.  I wrote a letter in support of him working 20-40 hour weeks instead as able.   Discussed options.  Some selective serotonin reuptake inhibitor data, risk/benefit discussed will initiate.  He is interested in working with PT for balance training, for vertigo, will initiate.      Plan:  Work letter provided  Zoloft 25 mg x1 month, 50 mg thereafter but can go higher as tolerated/needed  PT ordered  Discussed follow up timing, happy to see him back, he is comfortable with our follow up being open ended for now.  He will reach out with any issues questions or changes       Zackery Owens MD   of Neurology   [facility]      Interval history:     Has cut down to half his normal work load.    Worried about Parkinsons  Balance issues worsening  Still with dizziness, limiting  Has trouble with overworking, worsens his symptoms.     A/P  Gregg Lua is a very pleasant 58 year old male who presents today for evaluation of vertigo.  It has been ongoing since his 30s but has worsened over the last 3-4 years.  To review I think his CTA and MRI are unrevealing for cause, with some non specific T2 hyperintensities that likely correspond with metabolic risk factors.  Discussed options long standing non specific dizziness.  I do not see much in the way of migrainous confounding  variables.  Sometimes trialing selective serotonin reuptake inhibitors or SNRIs can be trialed empirically.  Discussed this is a common symptom that can be difficult to treat that some people are prone to without something we can clearly point to a particular cause or reason.       Plan:  Agree with meclizine PRN, side effects discusssed  National dizzy and balance center referral  MRI brain/CTA H/N reviewed, non contributory   Can consider empiric selective serotonin reuptake inhibitor/SNRI as above potentially  Will check in this fall to see how he is doing.       Past Medical History:     Patient Active Problem List   Diagnosis    Type 2 diabetes mellitus without complication, without long-term current use of insulin (H)    Hypertension goal BP (blood pressure) < 140/90    Obesity (BMI 35.0-39.9) with comorbidity (H)    DANIELLE (obstructive sleep apnea)    Seasonal allergic rhinitis, unspecified trigger    FH: MI (myocardial infarction)    High cholesterol    Vertigo     Past Medical History:   Diagnosis Date    Diabetes mellitus, type 2 (H)     High cholesterol     Hypertension         Past Surgical History:     Past Surgical History:   Procedure Laterality Date    GALLBLADDER SURGERY          Social History:     Social History     Tobacco Use    Smoking status: Never    Smokeless tobacco: Never   Vaping Use    Vaping status: Never Used   Substance Use Topics    Alcohol use: No    Drug use: No        Family History:     Family History   Problem Relation Age of Onset    Diabetes Mother     Eye Surgery Mother         cataracts    Heart Disease Father         age 36 MI .     Diabetes Brother     Diabetes Sister     Diabetes Sister     Diabetes Brother     Macular Degeneration No family hx of         Medications:     Current Outpatient Medications   Medication Sig Dispense Refill    amLODIPine (NORVASC) 10 MG tablet Take 1 tablet (10 mg) by mouth daily 90 tablet 1    atorvastatin (LIPITOR) 10 MG tablet Take 1 tablet  "(10 mg) by mouth daily 90 tablet 1    Continuous Blood Gluc Sensor (FREESTYLE ELODIA 3 SENSOR) MISC 1 Box continuous 6 each 11    Continuous Blood Gluc Sensor (FREESTYLE ELODIA 3 SENSOR) MISC 1 Box continuous 1 each 0    cyclobenzaprine (FLEXERIL) 5 MG tablet TAKE 1 TO 2 TABLETS BY MOUTH THREE TIMES DAILY AS NEEDED FOR MUSCLE SPASM OR PAIN. PREFERABLY TAKE AT BEDTIME 60 tablet 0    empagliflozin (JARDIANCE) 25 MG TABS tablet Take 0.5 tablets (12.5 mg) by mouth daily      fluticasone (FLONASE) 50 MCG/ACT nasal spray Spray 1-2 sprays into both nostrils daily 1 g 11    hydrOXYzine HCl (ATARAX) 25 MG tablet Take 1 tablet (25 mg) by mouth 3 times daily as needed for itching 60 tablet 0    lisinopril (ZESTRIL) 20 MG tablet Take 1 tablet (20 mg) by mouth daily 90 tablet 1    metFORMIN (GLUCOPHAGE XR) 500 MG 24 hr tablet Take 2 tablets (1,000 mg) by mouth 2 times daily (with meals) 360 tablet 1    methocarbamol (ROBAXIN) 750 MG tablet Take 1 tablet (750 mg) by mouth 3 times daily 30 tablet 0     No current facility-administered medications for this visit.        Allergies:     Allergies   Allergen Reactions    Seasonal Allergies Other (See Comments) and Headache     whoozy          Physical Exam:   Vitals: /85   Pulse 98   Ht 1.727 m (5' 8\")   Wt 108.4 kg (239 lb)   BMI 36.34 kg/m       Neuro:   General Appearance: No apparent distress, well-nourished, well-groomed, pleasant      Mental Status: Alert and oriented to person, place, and time. Speech fluent and comprehension intact. No dysarthria.     Cranial Nerves:   II: Visual fields: normal  III: Pupils: 3 mm, equal, round, reactive to light   III,IV,VI: Extraocular Movements: intact   V: Facial sensation: intact to light touch  VII: Facial strength: intact without asymmetry  VIII: Hearing: intact grossly  IX: Palate: intact  XI: Shoulder shrug: intact  XII: Tongue movement: normal     Motor Exam:   5/5 Diffusely     No drift is present. No abnormal movements. Tone " is normal throughout.     Sensory: intact to light touch     Coordination: no dysmetria with finger-to-nose bilaterally     Reflexes: biceps, triceps, brachioradialis, patellar, and ankle jerks 1+ and symmetric.     Gait: modest casual gait, issues with tandem.              Data: Pertinent prior to visit   MRI brain 12/2023   1.  No acute intracranial process.   2.  Mild to moderate scattered nonspecific foci of T2/FLAIR hyperintense signal in the cerebral white matter. These findings are most often seen in the setting of chronic microangiopathic ischemic change. Other differential diagnostic considerations include a demyelinating process, autoimmune vasculitis, Lyme disease, or even chronic migraine effect, among other etiologies.   3.  Right-sided mastoid effusion.      CT/CTA 2023     HEAD CT:   1.  No acute intracranial hemorrhage.   2.  While partially obscured by streak artifact from the skull base, there appears to be an asymmetric area of hypoattenuation in the left cerebellar hemisphere, which raises concern for possible age-indeterminate infarct. No substantial mass effect. Brain MRI is recommended for further evaluation.   3.  Additional chronic changes as detailed above.     HEAD CTA:   1.  No high-grade stenosis or occlusion involving the major intracranial arteries.   2.  No aneurysm or evidence of high flow vascular malformation.   3.  Variant anatomy as detailed above.     NECK CTA:   1.  No high-grade stenosis or occlusion involving the major arteries of the neck.   2.  No evidence of dissection.   3.  Multilevel degenerative changes of the cervical spine with moderate to advanced spinal canal stenosis at C4-C5 and C5-C6.          I personally reviewed the above images and reports.  The imaging represents to me unrevealing MRI brain. T2 hyperintensities non specific            The total time of this encounter today amounted to 31 minutes. This time included time spent with the patient, prep work,  ordering tests, and performing post visit documentation.

## 2024-10-16 NOTE — LETTER
October 16, 2024      Gregg Lua  [id number] [address] [address]        To whom it may concern      Mr Lua is a patient of mine who has suffered from non specific vertigo and now worsening balance issues as well for years.     Overworking is a big trigger and he cannot seem to function during stretches of overworking/he cannot do this given dizziness limitations.     I am medically recommending he work a conventional work schedule (~20-40 hours) so that he can continue to function and work.           Sincerely,        Vivek Owens MD

## 2024-10-16 NOTE — LETTER
10/16/2024      Gregg Lua  [address] [address] [address]      Dear Colleague,    Thank you for referring your patient, Gregg Lua, to the [facility] [place]. Please see a copy of my visit note below.    [facility]/[place]  Section of General Neurology  Return Patient Visit    Gregg Lua MRN# [id number]   Age: 59 year old YOB: 1965          Assessment and Plan:   Assessment:  Gregg Lua is a very pleasant 59 year old male who presents today in follow up for non specific vertigo.  Meclizine does not help.  MRI/CTA WNL.  He has been worked up with unrevealing data in the past as well.  He usually worked 70-80 hour weeks in the past but doesn't think he is capable of this anymore. I think that is reasonable given his limitations/that he go down to more conventional work hours.  I wrote a letter in support of him working 20-40 hour weeks instead as able.   Discussed options.  Some selective serotonin reuptake inhibitor data, risk/benefit discussed will initiate.  He is interested in working with PT for balance training, for vertigo, will initiate.      Plan:  Work letter provided  Zoloft 25 mg x1 month, 50 mg thereafter but can go higher as tolerated/needed  PT ordered  Discussed follow up timing, happy to see him back, he is comfortable with our follow up being open ended for now.  He will reach out with any issues questions or changes       Zackery Owens MD   of Neurology   [facility]      Interval history:     Has cut down to half his normal work load.    Worried about Parkinsons  Balance issues worsening  Still with dizziness, limiting  Has trouble with overworking, worsens his symptoms.     A/P  Gregg Lua is a very pleasant 58 year old male who presents today for evaluation of vertigo.  It has been ongoing since his 30s but has worsened over the last 3-4 years.  To review I think his  CTA and MRI are unrevealing for cause, with some non specific T2 hyperintensities that likely correspond with metabolic risk factors.  Discussed options long standing non specific dizziness.  I do not see much in the way of migrainous confounding variables.  Sometimes trialing selective serotonin reuptake inhibitors or SNRIs can be trialed empirically.  Discussed this is a common symptom that can be difficult to treat that some people are prone to without something we can clearly point to a particular cause or reason.       Plan:  Agree with meclizine PRN, side effects discusssed  National dizzy and balance center referral  MRI brain/CTA H/N reviewed, non contributory   Can consider empiric selective serotonin reuptake inhibitor/SNRI as above potentially  Will check in this fall to see how he is doing.       Past Medical History:     Patient Active Problem List   Diagnosis     Type 2 diabetes mellitus without complication, without long-term current use of insulin (H)     Hypertension goal BP (blood pressure) < 140/90     Obesity (BMI 35.0-39.9) with comorbidity (H)     DANIELLE (obstructive sleep apnea)     Seasonal allergic rhinitis, unspecified trigger     FH: MI (myocardial infarction)     High cholesterol     Vertigo     Past Medical History:   Diagnosis Date     Diabetes mellitus, type 2 (H)      High cholesterol      Hypertension         Past Surgical History:     Past Surgical History:   Procedure Laterality Date     GALLBLADDER SURGERY          Social History:     Social History     Tobacco Use     Smoking status: Never     Smokeless tobacco: Never   Vaping Use     Vaping status: Never Used   Substance Use Topics     Alcohol use: No     Drug use: No        Family History:     Family History   Problem Relation Age of Onset     Diabetes Mother      Eye Surgery Mother         cataracts     Heart Disease Father         age 36 MI .      Diabetes Brother      Diabetes Sister      Diabetes Sister      Diabetes  "Brother      Macular Degeneration No family hx of         Medications:     Current Outpatient Medications   Medication Sig Dispense Refill     amLODIPine (NORVASC) 10 MG tablet Take 1 tablet (10 mg) by mouth daily 90 tablet 1     atorvastatin (LIPITOR) 10 MG tablet Take 1 tablet (10 mg) by mouth daily 90 tablet 1     Continuous Blood Gluc Sensor (FREESTYLE ELODIA 3 SENSOR) MISC 1 Box continuous 6 each 11     Continuous Blood Gluc Sensor (FREESTYLE ELODIA 3 SENSOR) MISC 1 Box continuous 1 each 0     cyclobenzaprine (FLEXERIL) 5 MG tablet TAKE 1 TO 2 TABLETS BY MOUTH THREE TIMES DAILY AS NEEDED FOR MUSCLE SPASM OR PAIN. PREFERABLY TAKE AT BEDTIME 60 tablet 0     empagliflozin (JARDIANCE) 25 MG TABS tablet Take 0.5 tablets (12.5 mg) by mouth daily       fluticasone (FLONASE) 50 MCG/ACT nasal spray Spray 1-2 sprays into both nostrils daily 1 g 11     hydrOXYzine HCl (ATARAX) 25 MG tablet Take 1 tablet (25 mg) by mouth 3 times daily as needed for itching 60 tablet 0     lisinopril (ZESTRIL) 20 MG tablet Take 1 tablet (20 mg) by mouth daily 90 tablet 1     metFORMIN (GLUCOPHAGE XR) 500 MG 24 hr tablet Take 2 tablets (1,000 mg) by mouth 2 times daily (with meals) 360 tablet 1     methocarbamol (ROBAXIN) 750 MG tablet Take 1 tablet (750 mg) by mouth 3 times daily 30 tablet 0     No current facility-administered medications for this visit.        Allergies:     Allergies   Allergen Reactions     Seasonal Allergies Other (See Comments) and Headache     whoozy          Physical Exam:   Vitals: /85   Pulse 98   Ht 1.727 m (5' 8\")   Wt 108.4 kg (239 lb)   BMI 36.34 kg/m       Neuro:   General Appearance: No apparent distress, well-nourished, well-groomed, pleasant      Mental Status: Alert and oriented to person, place, and time. Speech fluent and comprehension intact. No dysarthria.     Cranial Nerves:   II: Visual fields: normal  III: Pupils: 3 mm, equal, round, reactive to light   III,IV,VI: Extraocular Movements: " intact   V: Facial sensation: intact to light touch  VII: Facial strength: intact without asymmetry  VIII: Hearing: intact grossly  IX: Palate: intact  XI: Shoulder shrug: intact  XII: Tongue movement: normal     Motor Exam:   5/5 Diffusely     No drift is present. No abnormal movements. Tone is normal throughout.     Sensory: intact to light touch     Coordination: no dysmetria with finger-to-nose bilaterally     Reflexes: biceps, triceps, brachioradialis, patellar, and ankle jerks 1+ and symmetric.     Gait: modest casual gait, issues with tandem.              Data: Pertinent prior to visit   MRI brain 12/2023   1.  No acute intracranial process.   2.  Mild to moderate scattered nonspecific foci of T2/FLAIR hyperintense signal in the cerebral white matter. These findings are most often seen in the setting of chronic microangiopathic ischemic change. Other differential diagnostic considerations include a demyelinating process, autoimmune vasculitis, Lyme disease, or even chronic migraine effect, among other etiologies.   3.  Right-sided mastoid effusion.      CT/CTA 2023     HEAD CT:   1.  No acute intracranial hemorrhage.   2.  While partially obscured by streak artifact from the skull base, there appears to be an asymmetric area of hypoattenuation in the left cerebellar hemisphere, which raises concern for possible age-indeterminate infarct. No substantial mass effect. Brain MRI is recommended for further evaluation.   3.  Additional chronic changes as detailed above.     HEAD CTA:   1.  No high-grade stenosis or occlusion involving the major intracranial arteries.   2.  No aneurysm or evidence of high flow vascular malformation.   3.  Variant anatomy as detailed above.     NECK CTA:   1.  No high-grade stenosis or occlusion involving the major arteries of the neck.   2.  No evidence of dissection.   3.  Multilevel degenerative changes of the cervical spine with moderate to advanced spinal canal stenosis at C4-C5  and C5-C6.          I personally reviewed the above images and reports.  The imaging represents to me unrevealing MRI brain. T2 hyperintensities non specific            The total time of this encounter today amounted to 31 minutes. This time included time spent with the patient, prep work, ordering tests, and performing post visit documentation.        Again, thank you for allowing me to participate in the care of your patient.        Sincerely,        Vivek Owens MD

## 2024-10-20 ENCOUNTER — HEALTH MAINTENANCE LETTER (OUTPATIENT)
Age: 59
End: 2024-10-20

## 2025-02-26 DIAGNOSIS — E11.9 TYPE 2 DIABETES MELLITUS WITHOUT COMPLICATION, WITHOUT LONG-TERM CURRENT USE OF INSULIN (H): ICD-10-CM

## 2025-02-26 DIAGNOSIS — I10 HYPERTENSION GOAL BP (BLOOD PRESSURE) < 140/90: ICD-10-CM

## 2025-02-26 RX ORDER — LISINOPRIL 20 MG/1
20 TABLET ORAL DAILY
Qty: 90 TABLET | Refills: 0 | Status: SHIPPED | OUTPATIENT
Start: 2025-02-26

## 2025-02-26 RX ORDER — METFORMIN HYDROCHLORIDE 500 MG/1
1000 TABLET, EXTENDED RELEASE ORAL 2 TIMES DAILY WITH MEALS
Qty: 360 TABLET | Refills: 0 | Status: SHIPPED | OUTPATIENT
Start: 2025-02-26

## 2025-02-26 RX ORDER — AMLODIPINE BESYLATE 10 MG/1
10 TABLET ORAL DAILY
Qty: 90 TABLET | Refills: 0 | Status: SHIPPED | OUTPATIENT
Start: 2025-02-26

## 2025-02-26 NOTE — LETTER
February 27, 2025    Gregg Lua  [address] [address] [address]    Dear Gregg,       We recently received a refill request for lisinopril (ZESTRIL) 20 MG tablet, amLODIPine (NORVASC) 10 MG tablet, and metFORMIN (GLUCOPHAGE XR) 500 MG 24 hr tablet.  We have refilled this for a one time 90 day supply only because you are due for a:    Medication Follow Up office visit      Please call at your earliest convenience so that there will not be a delay with your future refills.          Thank you,   Your [facility] Team/[place]  [phone]          Electronically signed

## 2025-05-04 ENCOUNTER — HEALTH MAINTENANCE LETTER (OUTPATIENT)
Age: 60
End: 2025-05-04

## 2025-06-03 DIAGNOSIS — E11.9 TYPE 2 DIABETES MELLITUS WITHOUT COMPLICATION, WITHOUT LONG-TERM CURRENT USE OF INSULIN (H): ICD-10-CM

## 2025-06-03 RX ORDER — METFORMIN HYDROCHLORIDE 500 MG/1
1000 TABLET, EXTENDED RELEASE ORAL 2 TIMES DAILY WITH MEALS
Qty: 360 TABLET | Refills: 0 | Status: SHIPPED | OUTPATIENT
Start: 2025-06-03

## 2025-06-03 NOTE — TELEPHONE ENCOUNTER
Medication Question or Refill        What medication are you calling about (include dose and sig)?: Medication pended    Preferred Pharmacy:  [id number] [place] - [address] [address]  [address]  [place] [facility] [place] [address]  Phone: [phone] Fax: [phone]      Controlled Substance Agreement on file:   CSA -- Patient Level:    CSA: None found at the patient level.       Who prescribed the medication?: Mohit Randall    Do you need a refill? Yes    When did you use the medication last?     Patient offered an appointment? Patient has appointment 6/11/25    Do you have any questions or concerns?        Could we send this information to you in [place] or would you prefer to receive a phone call?:   Patient would prefer a phone call   Okay to leave a detailed message?: Yes at Cell number on file:    Telephone Information:   Mobile [phone]

## 2025-06-11 ENCOUNTER — OFFICE VISIT (OUTPATIENT)
Dept: FAMILY MEDICINE | Facility: CLINIC | Age: 60
End: 2025-06-11
Payer: COMMERCIAL

## 2025-06-11 VITALS
TEMPERATURE: 96.8 F | BODY MASS INDEX: 37.44 KG/M2 | RESPIRATION RATE: 16 BRPM | SYSTOLIC BLOOD PRESSURE: 150 MMHG | DIASTOLIC BLOOD PRESSURE: 90 MMHG | OXYGEN SATURATION: 98 % | WEIGHT: 247 LBS | HEIGHT: 68 IN | HEART RATE: 98 BPM

## 2025-06-11 DIAGNOSIS — E66.01 MORBID OBESITY (H): ICD-10-CM

## 2025-06-11 DIAGNOSIS — E78.00 HIGH CHOLESTEROL: ICD-10-CM

## 2025-06-11 DIAGNOSIS — E11.9 TYPE 2 DIABETES MELLITUS WITHOUT COMPLICATION, WITHOUT LONG-TERM CURRENT USE OF INSULIN (H): Primary | ICD-10-CM

## 2025-06-11 DIAGNOSIS — I10 HYPERTENSION GOAL BP (BLOOD PRESSURE) < 140/90: ICD-10-CM

## 2025-06-11 LAB
EST. AVERAGE GLUCOSE BLD GHB EST-MCNC: 200 MG/DL
HBA1C MFR BLD: 8.6 % (ref 0–5.6)

## 2025-06-11 PROCEDURE — 3077F SYST BP >= 140 MM HG: CPT | Performed by: PHYSICIAN ASSISTANT

## 2025-06-11 PROCEDURE — 1125F AMNT PAIN NOTED PAIN PRSNT: CPT | Performed by: PHYSICIAN ASSISTANT

## 2025-06-11 PROCEDURE — G2211 COMPLEX E/M VISIT ADD ON: HCPCS | Performed by: PHYSICIAN ASSISTANT

## 2025-06-11 PROCEDURE — 99207 PR FOOT EXAM NO CHARGE: CPT | Performed by: PHYSICIAN ASSISTANT

## 2025-06-11 PROCEDURE — 82570 ASSAY OF URINE CREATININE: CPT | Performed by: PHYSICIAN ASSISTANT

## 2025-06-11 PROCEDURE — 99214 OFFICE O/P EST MOD 30 MIN: CPT | Performed by: PHYSICIAN ASSISTANT

## 2025-06-11 PROCEDURE — 82043 UR ALBUMIN QUANTITATIVE: CPT | Performed by: PHYSICIAN ASSISTANT

## 2025-06-11 PROCEDURE — 36415 COLL VENOUS BLD VENIPUNCTURE: CPT | Performed by: PHYSICIAN ASSISTANT

## 2025-06-11 PROCEDURE — 80061 LIPID PANEL: CPT | Performed by: PHYSICIAN ASSISTANT

## 2025-06-11 PROCEDURE — 3080F DIAST BP >= 90 MM HG: CPT | Performed by: PHYSICIAN ASSISTANT

## 2025-06-11 PROCEDURE — 83036 HEMOGLOBIN GLYCOSYLATED A1C: CPT | Performed by: PHYSICIAN ASSISTANT

## 2025-06-11 PROCEDURE — 80053 COMPREHEN METABOLIC PANEL: CPT | Performed by: PHYSICIAN ASSISTANT

## 2025-06-11 RX ORDER — AMLODIPINE BESYLATE 10 MG/1
10 TABLET ORAL DAILY
Qty: 90 TABLET | Refills: 0 | Status: SHIPPED | OUTPATIENT
Start: 2025-06-11

## 2025-06-11 RX ORDER — LISINOPRIL 20 MG/1
20 TABLET ORAL DAILY
Qty: 90 TABLET | Refills: 0 | Status: SHIPPED | OUTPATIENT
Start: 2025-06-11

## 2025-06-11 ASSESSMENT — PAIN SCALES - GENERAL: PAINLEVEL_OUTOF10: MODERATE PAIN (4)

## 2025-06-11 NOTE — PROGRESS NOTES
"  Assessment & Plan       ICD-10-CM    1. Type 2 diabetes mellitus without complication, without long-term current use of insulin (H)  E11.9 Albumin Random Urine Quantitative with Creat Ratio     HEMOGLOBIN A1C     Lipid panel reflex to direct LDL Non-fasting     Comprehensive metabolic panel (BMP + Alb, Alk Phos, ALT, AST, Total. Bili, TP)     FOOT EXAM     Albumin Random Urine Quantitative with Creat Ratio     HEMOGLOBIN A1C     Lipid panel reflex to direct LDL Non-fasting     Comprehensive metabolic panel (BMP + Alb, Alk Phos, ALT, AST, Total. Bili, TP)      2. Hypertension goal BP (blood pressure) < 140/90  I10 lisinopril (ZESTRIL) 20 MG tablet     amLODIPine (NORVASC) 10 MG tablet      3. High cholesterol  E78.00       4. Morbid obesity (H)  E66.01       Work on Healthy diet and exercise. Getting heart rate elevated for 30 mins most days of week.  medical conditions are stable. meds refilled.  I reviewed his medications with him and printed off his med list to make sure he is taking his medications correctly at home.  Will recheck his blood pressure in 2 weeks and make adjustments if needed.  Labs are pending.      BMI  Estimated body mass index is 37.56 kg/m  as calculated from the following:    Height as of this encounter: 1.727 m (5' 8\").    Weight as of this encounter: 112 kg (247 lb).   Weight management plan: Discussed healthy diet and exercise guidelines      Liz Escobedo is a 59 year old, presenting for the following health issues:  RECHECK  Last visit 6/24.         6/11/2025    10:40 AM   Additional Questions   Roomed by amber   Accompanied by self         6/11/2025    10:40 AM   Patient Reported Additional Medications   Patient reports taking the following new medications no     History of Present Illness       Diabetes:   He presents for follow up of diabetes.  He is checking home blood glucose a few times a month.   He checks blood glucose before meals.  Blood glucose is sometimes over 200 and " never under 70. He is aware of hypoglycemia symptoms including shakiness.    He has no concerns regarding his diabetes at this time.   He is not experiencing numbness or burning in feet, excessive thirst, blurry vision, weight changes or redness, sores or blisters on feet. The patient has not had a diabetic eye exam in the last 12 months.          Hypertension: He presents for follow up of hypertension.  He does check blood pressure  regularly outside of the clinic. Outside blood pressures have been over 140/90. He follows a low salt diet.     He eats 0-1 servings of fruits and vegetables daily.He consumes 1 sweetened beverage(s) daily.He exercises with enough effort to increase his heart rate 20 to 29 minutes per day.  He exercises with enough effort to increase his heart rate 3 or less days per week.   He is taking medications regularly.          Diabetes Follow-up    How often are you checking your blood sugar? Not at all  What concerns do you have today about your diabetes? None   Do you have any of these symptoms? (Select all that apply)  Numbness in feet  Have you had a diabetic eye exam in the last 12 months? No            Hyperlipidemia Follow-Up    Are you regularly taking any medication or supplement to lower your cholesterol?   Yes- lipitor  Are you having muscle aches or other side effects that you think could be caused by your cholesterol lowering medication?  No    Hypertension Follow-up    Do you check your blood pressure regularly outside of the clinic? No   Are you following a low salt diet? No  Are your blood pressures ever more than 140 on the top number (systolic) OR more   than 90 on the bottom number (diastolic), for example 140/90? N/A  He is not sure if he is taking all of his medications correctly.    BP Readings from Last 2 Encounters:   06/11/25 (!) 150/90   10/16/24 136/85     Hemoglobin A1C (%)   Date Value   04/18/2024 6.0 (H)   12/29/2023 5.9 (H)   06/29/2021 6.6 (H)   09/03/2020 6.3  "(H)     LDL Cholesterol Calculated (mg/dL)   Date Value   06/06/2024 22   03/08/2024 31   06/29/2021 81   09/03/2020 28       Review of Systems  Constitutional, HEENT, cardiovascular, pulmonary, gi and gu systems are negative, except as otherwise noted.      Objective    BP (!) 150/90   Pulse 98   Temp 96.8  F (36  C) (Tympanic)   Resp 16   Ht 1.727 m (5' 8\")   Wt 112 kg (247 lb)   SpO2 98%   BMI 37.56 kg/m    Body mass index is 37.56 kg/m .  Physical Exam   GENERAL: alert and no distress  EYES: Eyes grossly normal to inspection, PERRL and conjunctivae and sclerae normal  HENT: ear canals and TM's normal, nose and mouth without ulcers or lesions  NECK: no adenopathy, no asymmetry, masses, or scars  RESP: lungs clear to auscultation - no rales, rhonchi or wheezes  CV: regular rate and rhythm, normal S1 S2, no S3 or S4, no murmur, click or rub, no peripheral edema  ABDOMEN: soft, nontender, no hepatosplenomegaly, no masses and bowel sounds normal  MS: no gross musculoskeletal defects noted, no edema  SKIN: no suspicious lesions or rashes  NEURO: Normal strength and tone, mentation intact and speech normal  PSYCH: mentation appears normal, affect normal/bright  Diabetic foot exam: normal DP and PT pulses, no trophic changes or ulcerative lesions, normal sensory exam, and normal monofilament exam    Labs pending        Signed Electronically by: Mohit Randall PA-C    "

## 2025-06-12 ENCOUNTER — RESULTS FOLLOW-UP (OUTPATIENT)
Dept: FAMILY MEDICINE | Facility: CLINIC | Age: 60
End: 2025-06-12

## 2025-06-12 LAB
ALBUMIN SERPL BCG-MCNC: 4.1 G/DL (ref 3.5–5.2)
ALP SERPL-CCNC: 52 U/L (ref 40–150)
ALT SERPL W P-5'-P-CCNC: 11 U/L (ref 0–70)
ANION GAP SERPL CALCULATED.3IONS-SCNC: 8 MMOL/L (ref 7–15)
AST SERPL W P-5'-P-CCNC: 17 U/L (ref 0–45)
BILIRUB SERPL-MCNC: 0.2 MG/DL
BUN SERPL-MCNC: 15.6 MG/DL (ref 8–23)
CALCIUM SERPL-MCNC: 9.5 MG/DL (ref 8.8–10.4)
CHLORIDE SERPL-SCNC: 105 MMOL/L (ref 98–107)
CHOLEST SERPL-MCNC: 179 MG/DL
CREAT SERPL-MCNC: 1.19 MG/DL (ref 0.67–1.17)
CREAT UR-MCNC: 264 MG/DL
EGFRCR SERPLBLD CKD-EPI 2021: 70 ML/MIN/1.73M2
FASTING STATUS PATIENT QL REPORTED: NO
FASTING STATUS PATIENT QL REPORTED: NO
GLUCOSE SERPL-MCNC: 207 MG/DL (ref 70–99)
HCO3 SERPL-SCNC: 26 MMOL/L (ref 22–29)
HDLC SERPL-MCNC: 36 MG/DL
LDLC SERPL CALC-MCNC: 99 MG/DL
MICROALBUMIN UR-MCNC: 25.7 MG/L
MICROALBUMIN/CREAT UR: 9.73 MG/G CR (ref 0–17)
NONHDLC SERPL-MCNC: 143 MG/DL
POTASSIUM SERPL-SCNC: 4.5 MMOL/L (ref 3.4–5.3)
PROT SERPL-MCNC: 7.1 G/DL (ref 6.4–8.3)
SODIUM SERPL-SCNC: 139 MMOL/L (ref 135–145)
TRIGL SERPL-MCNC: 221 MG/DL

## 2025-06-15 ENCOUNTER — HEALTH MAINTENANCE LETTER (OUTPATIENT)
Age: 60
End: 2025-06-15

## 2025-06-25 ENCOUNTER — OFFICE VISIT (OUTPATIENT)
Dept: FAMILY MEDICINE | Facility: CLINIC | Age: 60
End: 2025-06-25
Payer: COMMERCIAL

## 2025-06-25 VITALS
OXYGEN SATURATION: 100 % | TEMPERATURE: 97.3 F | SYSTOLIC BLOOD PRESSURE: 139 MMHG | WEIGHT: 243 LBS | HEIGHT: 68 IN | RESPIRATION RATE: 16 BRPM | DIASTOLIC BLOOD PRESSURE: 89 MMHG | HEART RATE: 88 BPM | BODY MASS INDEX: 36.83 KG/M2

## 2025-06-25 DIAGNOSIS — I10 HYPERTENSION GOAL BP (BLOOD PRESSURE) < 140/90: Primary | Chronic | ICD-10-CM

## 2025-06-25 DIAGNOSIS — E11.9 TYPE 2 DIABETES MELLITUS WITHOUT COMPLICATION, WITHOUT LONG-TERM CURRENT USE OF INSULIN (H): Chronic | ICD-10-CM

## 2025-06-25 PROCEDURE — 99213 OFFICE O/P EST LOW 20 MIN: CPT | Performed by: PHYSICIAN ASSISTANT

## 2025-06-25 PROCEDURE — 3075F SYST BP GE 130 - 139MM HG: CPT | Performed by: PHYSICIAN ASSISTANT

## 2025-06-25 PROCEDURE — 1126F AMNT PAIN NOTED NONE PRSNT: CPT | Performed by: PHYSICIAN ASSISTANT

## 2025-06-25 PROCEDURE — 3079F DIAST BP 80-89 MM HG: CPT | Performed by: PHYSICIAN ASSISTANT

## 2025-06-25 PROCEDURE — G2211 COMPLEX E/M VISIT ADD ON: HCPCS | Performed by: PHYSICIAN ASSISTANT

## 2025-06-25 ASSESSMENT — PAIN SCALES - GENERAL: PAINLEVEL_OUTOF10: NO PAIN (0)

## 2025-06-25 NOTE — PROGRESS NOTES
Assessment & Plan       ICD-10-CM    1. Hypertension goal BP (blood pressure) < 140/90  I10       2. Type 2 diabetes mellitus without complication, without long-term current use of insulin (H)  E11.9       Work on Healthy diet and exercise. Getting heart rate elevated for 30 mins most days of week.  medical conditions are stable. meds refilled.  Recheck diabetes in about 3 months.   warning signs discussed.   The longitudinal plan of care for the diagnosis(es)/condition(s) as documented were addressed during this visit. Due to the added complexity in care, I will continue to support Gregg in the subsequent management and with ongoing continuity of care.    Subjective   Gregg is a 60 year old diabetic, presenting for the following health issues:  Hypertension        6/25/2025     1:06 PM   Additional Questions   Roomed by amber   Accompanied by self         6/25/2025     1:06 PM   Patient Reported Additional Medications   Patient reports taking the following new medications no     History of Present Illness       Diabetes:   He presents for follow up of diabetes.  He is checking home blood glucose a few times a month.   He checks blood glucose before meals.  Blood glucose is sometimes over 200 and never under 70. He is aware of hypoglycemia symptoms including shakiness.    He has no concerns regarding his diabetes at this time.   He is not experiencing numbness or burning in feet, excessive thirst, blurry vision, weight changes or redness, sores or blisters on feet. The patient has not had a diabetic eye exam in the last 12 months.          Hypertension: He presents for follow up of hypertension.  He does check blood pressure  regularly outside of the clinic. Outside blood pressures have been over 140/90. He follows a low salt diet.     He eats 0-1 servings of fruits and vegetables daily.He consumes 1 sweetened beverage(s) daily.He exercises with enough effort to increase his heart rate 20 to 29 minutes per day.  He  "exercises with enough effort to increase his heart rate 3 or less days per week.   He is taking medications regularly.        Here for recheck HTN. He has been taking all his medications correctly.     Hypertension Follow-up    Do you check your blood pressure regularly outside of the clinic? No   Are you following a low salt diet? Yes  Are your blood pressures ever more than 140 on the top number (systolic) OR more   than 90 on the bottom number (diastolic), for example 140/90? No    BP Readings from Last 2 Encounters:   06/25/25 139/89   06/11/25 (!) 150/90       Review of Systems  Constitutional, HEENT, cardiovascular, pulmonary, gi and gu systems are negative, except as otherwise noted.        Objective    /89   Pulse 88   Temp 97.3  F (36.3  C) (Tympanic)   Resp 16   Ht 1.727 m (5' 8\")   Wt 110.2 kg (243 lb)   SpO2 100%   BMI 36.95 kg/m    Body mass index is 36.95 kg/m .  Physical Exam   GENERAL: alert and no distress  RESP: lungs clear to auscultation - no rales, rhonchi or wheezes  CV: regular rate and rhythm, normal S1 S2, no S3 or S4, no murmur, click or rub, no peripheral edema  MS: no gross musculoskeletal defects noted, no edema    Labs reviewed        Signed Electronically by: Mohit Randall PA-C    "